# Patient Record
Sex: MALE | Race: WHITE | NOT HISPANIC OR LATINO | URBAN - METROPOLITAN AREA
[De-identification: names, ages, dates, MRNs, and addresses within clinical notes are randomized per-mention and may not be internally consistent; named-entity substitution may affect disease eponyms.]

---

## 2017-02-07 ENCOUNTER — HISTORICAL (OUTPATIENT)
Dept: RADIOLOGY | Facility: HOSPITAL | Age: 72
End: 2017-02-07

## 2017-10-26 ENCOUNTER — HISTORICAL (OUTPATIENT)
Dept: RADIOLOGY | Facility: HOSPITAL | Age: 72
End: 2017-10-26

## 2018-02-08 ENCOUNTER — HISTORICAL (OUTPATIENT)
Dept: ADMINISTRATIVE | Facility: HOSPITAL | Age: 73
End: 2018-02-08

## 2018-02-19 ENCOUNTER — HISTORICAL (OUTPATIENT)
Dept: LAB | Facility: HOSPITAL | Age: 73
End: 2018-02-19

## 2018-02-19 LAB
ABS NEUT (OLG): 3.23 X10(3)/MCL (ref 2.1–9.2)
ALBUMIN SERPL-MCNC: 4 GM/DL (ref 3.4–5)
ALBUMIN/GLOB SERPL: 1.2 {RATIO}
ALP SERPL-CCNC: 68 UNIT/L (ref 50–136)
ALT SERPL-CCNC: 32 UNIT/L (ref 12–78)
APPEARANCE, UA: CLEAR
APTT PPP: 26.3 SECOND(S) (ref 24.8–36.9)
AST SERPL-CCNC: 24 UNIT/L (ref 15–37)
BACTERIA SPEC CULT: NORMAL /HPF
BASOPHILS # BLD AUTO: 0 X10(3)/MCL (ref 0–0.2)
BASOPHILS NFR BLD AUTO: 1 %
BILIRUB SERPL-MCNC: 0.9 MG/DL (ref 0.2–1)
BILIRUB UR QL STRIP: NEGATIVE
BILIRUBIN DIRECT+TOT PNL SERPL-MCNC: 0.2 MG/DL (ref 0–0.2)
BILIRUBIN DIRECT+TOT PNL SERPL-MCNC: 0.7 MG/DL (ref 0–0.8)
BUN SERPL-MCNC: 17 MG/DL (ref 7–18)
CALCIUM SERPL-MCNC: 8.6 MG/DL (ref 8.5–10.1)
CHLORIDE SERPL-SCNC: 103 MMOL/L (ref 98–107)
CO2 SERPL-SCNC: 26 MMOL/L (ref 21–32)
COLOR UR: YELLOW
CREAT SERPL-MCNC: 0.94 MG/DL (ref 0.7–1.3)
EOSINOPHIL # BLD AUTO: 0.2 X10(3)/MCL (ref 0–0.9)
EOSINOPHIL NFR BLD AUTO: 4 %
ERYTHROCYTE [DISTWIDTH] IN BLOOD BY AUTOMATED COUNT: 12.7 % (ref 11.5–17)
GLOBULIN SER-MCNC: 3.2 GM/DL (ref 2.4–3.5)
GLUCOSE (UA): NEGATIVE
GLUCOSE SERPL-MCNC: 106 MG/DL (ref 74–106)
HCT VFR BLD AUTO: 45.6 % (ref 42–52)
HGB BLD-MCNC: 15 GM/DL (ref 14–18)
HGB UR QL STRIP: NEGATIVE
INR PPP: 0.94 (ref 0–1.27)
KETONES UR QL STRIP: NEGATIVE
LEUKOCYTE ESTERASE UR QL STRIP: NEGATIVE
LYMPHOCYTES # BLD AUTO: 1.6 X10(3)/MCL (ref 0.6–4.6)
LYMPHOCYTES NFR BLD AUTO: 28 %
MCH RBC QN AUTO: 30.4 PG (ref 27–31)
MCHC RBC AUTO-ENTMCNC: 32.9 GM/DL (ref 33–36)
MCV RBC AUTO: 92.5 FL (ref 80–94)
MONOCYTES # BLD AUTO: 0.6 X10(3)/MCL (ref 0.1–1.3)
MONOCYTES NFR BLD AUTO: 11 %
NEUTROPHILS # BLD AUTO: 3.23 X10(3)/MCL (ref 1.4–7.9)
NEUTROPHILS NFR BLD AUTO: 56 %
NITRITE UR QL STRIP: NEGATIVE
PH UR STRIP: 6 [PH] (ref 5–9)
PLATELET # BLD AUTO: 186 X10(3)/MCL (ref 130–400)
PMV BLD AUTO: 10.7 FL (ref 9.4–12.4)
POTASSIUM SERPL-SCNC: 4.6 MMOL/L (ref 3.5–5.1)
PROT SERPL-MCNC: 7.2 GM/DL (ref 6.4–8.2)
PROT UR QL STRIP: NEGATIVE
PROTHROMBIN TIME: 12.9 SECOND(S) (ref 12.2–14.7)
RBC # BLD AUTO: 4.93 X10(6)/MCL (ref 4.7–6.1)
RBC #/AREA URNS HPF: NORMAL /[HPF]
SODIUM SERPL-SCNC: 139 MMOL/L (ref 136–145)
SP GR UR STRIP: 1.02 (ref 1–1.03)
SQUAMOUS EPITHELIAL, UA: NORMAL
TRANSFERRIN SERPL-MCNC: 244 MG/DL (ref 200–360)
UROBILINOGEN UR STRIP-ACNC: 0.2
WBC # SPEC AUTO: 5.7 X10(3)/MCL (ref 4.5–11.5)
WBC #/AREA URNS HPF: NORMAL /HPF

## 2018-02-21 LAB — FINAL CULTURE: NORMAL

## 2018-04-03 ENCOUNTER — HISTORICAL (OUTPATIENT)
Dept: ADMINISTRATIVE | Facility: HOSPITAL | Age: 73
End: 2018-04-03

## 2019-11-05 ENCOUNTER — OFFICE VISIT (OUTPATIENT)
Dept: UROLOGY | Facility: CLINIC | Age: 74
End: 2019-11-05
Payer: COMMERCIAL

## 2019-11-05 VITALS
BODY MASS INDEX: 25.73 KG/M2 | HEART RATE: 65 BPM | HEIGHT: 72 IN | DIASTOLIC BLOOD PRESSURE: 80 MMHG | SYSTOLIC BLOOD PRESSURE: 135 MMHG | WEIGHT: 190 LBS

## 2019-11-05 DIAGNOSIS — R97.20 ELEVATED PSA: Primary | ICD-10-CM

## 2019-11-05 DIAGNOSIS — N13.8 BPH WITH URINARY OBSTRUCTION: ICD-10-CM

## 2019-11-05 DIAGNOSIS — N40.1 BPH WITH URINARY OBSTRUCTION: ICD-10-CM

## 2019-11-05 LAB — PSA, DIAGNOSTIC: 4.79 NG/ML (ref 0.1–4)

## 2019-11-05 PROCEDURE — 99213 PR OFFICE/OUTPT VISIT, EST, LEVL III, 20-29 MIN: ICD-10-PCS | Mod: S$GLB,,, | Performed by: UROLOGY

## 2019-11-05 PROCEDURE — 99213 OFFICE O/P EST LOW 20 MIN: CPT | Mod: S$GLB,,, | Performed by: UROLOGY

## 2019-11-05 RX ORDER — IRBESARTAN 300 MG/1
300 TABLET ORAL DAILY
Refills: 3 | COMMUNITY
Start: 2019-09-09

## 2019-11-05 RX ORDER — AMLODIPINE BESYLATE 5 MG/1
5 TABLET ORAL DAILY
Refills: 2 | COMMUNITY
Start: 2019-09-24

## 2019-11-05 RX ORDER — EZETIMIBE 10 MG/1
10 TABLET ORAL DAILY
Refills: 2 | COMMUNITY
Start: 2019-10-04

## 2019-11-05 RX ORDER — HYDROCHLOROTHIAZIDE 12.5 MG/1
12.5 TABLET ORAL DAILY
Refills: 3 | COMMUNITY
Start: 2019-09-09

## 2019-11-05 NOTE — PROGRESS NOTES
Subjective:       Patient ID: Marcelino Al is a 73 y.o. male.    Chief Complaint: Other (6 month fu )      HPI: 73-year-old male presents for 6 month re-evaluation.   Patient has a history of BPH with obstruction, and the patient is on Flomax.  The patient denies any pain or burning with urination.  States for his age he feels like he has a good stream.  Will have occasional episodes of nocturia maybe 1-2 times in a week.  Denies any blood in his urine.  Patient has a history of elevated PSA.  Two thousand fifteen PSA was 6.39.  In 2018 PSA is 5.77.  Patient has had a biopsy in the past which I believe was negative.  No other urinary complaints.  All the problems are stable at this time.      Past Medical History:   Past Medical History:   Diagnosis Date    CAD (coronary artery disease)     Elevated cholesterol     Hypertension        Past Surgical Historical:   Past Surgical History:   Procedure Laterality Date    CAROTID STENT      HEMORRHOID SURGERY      KNEE SURGERY      right knee replaced         Medications:   Medication List with Changes/Refills   Current Medications    AMLODIPINE (NORVASC) 5 MG TABLET    Take 5 mg by mouth once daily.    EZETIMIBE (ZETIA) 10 MG TABLET    Take 10 mg by mouth once daily.    HYDROCHLOROTHIAZIDE (HYDRODIURIL) 12.5 MG TAB    Take 12.5 mg by mouth once daily.    IRBESARTAN (AVAPRO) 300 MG TABLET    Take 300 mg by mouth once daily.    RANITIDINE (ZANTAC) 300 MG TABLET    Take 300 mg by mouth every evening.        Past Social History:   Social History     Socioeconomic History    Marital status:      Spouse name: Not on file    Number of children: Not on file    Years of education: Not on file    Highest education level: Not on file   Occupational History    Not on file   Social Needs    Financial resource strain: Not on file    Food insecurity:     Worry: Not on file     Inability: Not on file    Transportation needs:     Medical: Not on file      Non-medical: Not on file   Tobacco Use    Smoking status: Never Smoker   Substance and Sexual Activity    Alcohol use: Not Currently    Drug use: Never    Sexual activity: Not on file   Lifestyle    Physical activity:     Days per week: Not on file     Minutes per session: Not on file    Stress: Not on file   Relationships    Social connections:     Talks on phone: Not on file     Gets together: Not on file     Attends Voodoo service: Not on file     Active member of club or organization: Not on file     Attends meetings of clubs or organizations: Not on file     Relationship status: Not on file   Other Topics Concern    Not on file   Social History Narrative    Not on file       Allergies: Review of patient's allergies indicates:  Allergies no known allergies     Family History:   Family History   Problem Relation Age of Onset    Heart disease Father     Cancer Sister     Heart disease Brother     Heart disease Brother         Review of Systems:  Review of Systems   Constitutional: Negative for activity change and appetite change.   HENT: Negative for congestion and dental problem.    Respiratory: Negative for chest tightness and shortness of breath.    Cardiovascular: Negative for chest pain.   Gastrointestinal: Negative for abdominal distention and abdominal pain.   Genitourinary: Negative for decreased urine volume, difficulty urinating, discharge, dysuria, enuresis, flank pain, frequency, genital sores, hematuria, penile pain, penile swelling, scrotal swelling, testicular pain and urgency.   Musculoskeletal: Negative for back pain and neck pain.   Neurological: Negative for dizziness.   Hematological: Negative for adenopathy.   Psychiatric/Behavioral: Negative for agitation, behavioral problems and confusion.       Physical Exam:  Physical Exam   Nursing note and vitals reviewed.  Constitutional: He is oriented to person, place, and time. He appears well-developed and well-nourished.   HENT:    Head: Normocephalic.   Cardiovascular: Normal rate, regular rhythm and normal heart sounds.    Pulmonary/Chest: Effort normal and breath sounds normal.   Abdominal: Soft. Bowel sounds are normal.   Neurological: He is alert and oriented to person, place, and time.   Skin: Skin is warm and dry.         Assessment/Plan:   1.  BPH with obstruction:  Patient is doing well on Flomax.  We will continue Flomax.  2.  Elevated PSA:  We will check the patient's PSA, notify me of any abnormal findings.  The patient will be due for a ARACELI on next visit.  Plan follow-up in 6 months, sooner if needed.  Problem List Items Addressed This Visit     None      Visit Diagnoses     Elevated PSA    -  Primary    Relevant Orders    Prostate Specific Antigen, Diagnostic    BPH with urinary obstruction        Relevant Orders    Prostate Specific Antigen, Diagnostic

## 2019-11-06 ENCOUNTER — TELEPHONE (OUTPATIENT)
Dept: UROLOGY | Facility: CLINIC | Age: 74
End: 2019-11-06

## 2020-05-13 ENCOUNTER — OFFICE VISIT (OUTPATIENT)
Dept: UROLOGY | Facility: CLINIC | Age: 75
End: 2020-05-13
Payer: MEDICARE

## 2020-05-13 VITALS
RESPIRATION RATE: 18 BRPM | HEART RATE: 71 BPM | SYSTOLIC BLOOD PRESSURE: 158 MMHG | HEIGHT: 72 IN | DIASTOLIC BLOOD PRESSURE: 90 MMHG | BODY MASS INDEX: 25.73 KG/M2 | WEIGHT: 190 LBS

## 2020-05-13 DIAGNOSIS — R97.20 ELEVATED PSA: Primary | ICD-10-CM

## 2020-05-13 DIAGNOSIS — N13.8 BPH WITH URINARY OBSTRUCTION: ICD-10-CM

## 2020-05-13 DIAGNOSIS — R53.83 FATIGUE, UNSPECIFIED TYPE: ICD-10-CM

## 2020-05-13 DIAGNOSIS — N40.1 BPH WITH URINARY OBSTRUCTION: ICD-10-CM

## 2020-05-13 LAB — POC RESIDUAL URINE VOLUME: 221 ML (ref 0–100)

## 2020-05-13 PROCEDURE — 99214 PR OFFICE/OUTPT VISIT, EST, LEVL IV, 30-39 MIN: ICD-10-PCS | Mod: S$GLB,,, | Performed by: UROLOGY

## 2020-05-13 PROCEDURE — 1159F PR MEDICATION LIST DOCUMENTED IN MEDICAL RECORD: ICD-10-PCS | Mod: S$GLB,,, | Performed by: UROLOGY

## 2020-05-13 PROCEDURE — 51798 POCT BLADDER SCAN: ICD-10-PCS | Mod: S$GLB,,, | Performed by: UROLOGY

## 2020-05-13 PROCEDURE — 1159F MED LIST DOCD IN RCRD: CPT | Mod: S$GLB,,, | Performed by: UROLOGY

## 2020-05-13 PROCEDURE — 99214 OFFICE O/P EST MOD 30 MIN: CPT | Mod: S$GLB,,, | Performed by: UROLOGY

## 2020-05-13 PROCEDURE — 51798 US URINE CAPACITY MEASURE: CPT | Mod: S$GLB,,, | Performed by: UROLOGY

## 2020-05-13 RX ORDER — TAMSULOSIN HYDROCHLORIDE 0.4 MG/1
0.4 CAPSULE ORAL DAILY
COMMUNITY
End: 2020-06-01

## 2020-05-13 RX ORDER — FINASTERIDE 5 MG/1
5 TABLET, FILM COATED ORAL DAILY
COMMUNITY

## 2020-05-13 NOTE — PROGRESS NOTES
Subjective:       Patient ID: Marcelino Al is a 74 y.o. male.    Chief Complaint: 6 mth f/u and Elevated PSA (pt states [facility]  around [facility] and had increased more to around 7.1 or 7.2 (per pt))      HPI: 74-year-old male known service Dr. Devine with a history of BPH with LUTS and elevated PSA.  Patient underwent prostate ultrasound biopsy May of 2019 for PSA of 8.5.  Twelve 12 core specimens were benign.  Patient is voiding with stream that takes a prolonged time to empty.  He is on finasteride 5 mg and tamsulosin 0.4 mg.  He has no nocturia.  Denies dysuria, frequency, urgency, incontinence or gross hematuria.  Patient reports erections are fine.  No other urologic complaints       Past Medical History:   Past Medical History:   Diagnosis Date    BPH with urinary obstruction     CAD (coronary artery disease)     Elevated cholesterol     Elevated PSA     Hypertension        Past Surgical Historical:   Past Surgical History:   Procedure Laterality Date    CAROTID STENT      HEMORRHOID SURGERY      KNEE SURGERY      right knee replaced     PROSTATE BIOPSY          Medications:   Medication List with Changes/Refills   Current Medications    AMLODIPINE (NORVASC) 5 MG TABLET    Take 5 mg by mouth once daily.    EZETIMIBE (ZETIA) 10 MG TABLET    Take 10 mg by mouth once daily.    FINASTERIDE (PROSCAR) 5 MG TABLET    Take 5 mg by mouth once daily.    HYDROCHLOROTHIAZIDE (HYDRODIURIL) 12.5 MG TAB    Take 12.5 mg by mouth once daily.    IRBESARTAN (AVAPRO) 300 MG TABLET    Take 300 mg by mouth once daily.    RANITIDINE (ZANTAC) 300 MG TABLET    Take 300 mg by mouth every evening.    TAMSULOSIN (FLOMAX) 0.4 MG CAP    Take 0.4 mg by mouth once daily.        Past Social History:   Social History     Socioeconomic History    Marital status:      Spouse name: Not on file    Number of children: Not on file    Years of education: Not on file    Highest education level: Not on file    Occupational History    Not on file   Social Needs    Financial resource strain: Not on file    Food insecurity:     Worry: Not on file     Inability: Not on file    Transportation needs:     Medical: Not on file     Non-medical: Not on file   Tobacco Use    Smoking status: Never Smoker   Substance and Sexual Activity    Alcohol use: Not Currently    Drug use: Never    Sexual activity: Not on file   Lifestyle    Physical activity:     Days per week: Not on file     Minutes per session: Not on file    Stress: Not on file   Relationships    Social connections:     Talks on phone: Not on file     Gets together: Not on file     Attends Confucianism service: Not on file     Active member of club or organization: Not on file     Attends meetings of clubs or organizations: Not on file     Relationship status: Not on file   Other Topics Concern    Not on file   Social History Narrative    Not on file       Allergies: Review of patient's allergies indicates:  No Known Allergies     Family History:   Family History   Problem Relation Age of Onset    Heart disease Father     Cancer Sister     Heart disease Brother     Heart disease Brother         Review of Systems:  Review of Systems   Constitutional: Negative for activity change and appetite change.   HENT: Negative for congestion and dental problem.    Eyes: Negative for visual disturbance.   Respiratory: Negative for chest tightness and shortness of breath.    Cardiovascular: Negative for chest pain.   Gastrointestinal: Negative for abdominal distention and abdominal pain.   Genitourinary: Negative for decreased urine volume, difficulty urinating, discharge, dysuria, enuresis, flank pain, frequency, genital sores, hematuria, penile pain, penile swelling, scrotal swelling, testicular pain and urgency.   Musculoskeletal: Negative for back pain and neck pain.   Skin: Negative for color change.   Neurological: Negative for dizziness.   Hematological: Negative  for adenopathy.   Psychiatric/Behavioral: Negative for agitation, behavioral problems and confusion.       Physical Exam:  Physical Exam   Constitutional: He is oriented to person, place, and time. He appears well-developed and well-nourished.   HENT:   Head: Normocephalic.   Eyes: No scleral icterus.   Neck: Normal range of motion.   Cardiovascular: Intact distal pulses.    Pulmonary/Chest: Effort normal and breath sounds normal.   Abdominal: Soft. He exhibits no distension. There is no tenderness. No hernia. Hernia confirmed negative in the right inguinal area and confirmed negative in the left inguinal area.   Genitourinary: Testes normal and penis normal. Cremasteric reflex is present.   Neurological: He is alert and oriented to person, place, and time.   Skin: Skin is warm and dry.     Psychiatric: He has a normal mood and affect.       Assessment/Plan:   Elevated PSA--by history, benign biopsy May of 2019 with a PSA of 8.5.  Repeat serum percent free and total PSA and serum testosterone today.    BPH with LUTS--postvoid residual 221 mL.  Increase Flomax to 0.8 mg daily (adequate medication on hand)/continue finasteride 5 mg daily.  Return to clinic 2 weeks recheck PVR  Problem List Items Addressed This Visit     None      Visit Diagnoses     Elevated PSA    -  Primary    Relevant Orders    PSA, total and free    BPH with urinary obstruction        Relevant Orders    POCT Bladder Scan (Completed)    Fatigue, unspecified type        Relevant Orders    Testosterone

## 2020-05-14 LAB
Lab: NORMAL
TESTOST SERPL-MCNC: 209 NG/DL (ref 193–740)

## 2020-05-18 ENCOUNTER — TELEPHONE (OUTPATIENT)
Dept: UROLOGY | Facility: CLINIC | Age: 75
End: 2020-05-18

## 2020-05-27 ENCOUNTER — OFFICE VISIT (OUTPATIENT)
Dept: UROLOGY | Facility: CLINIC | Age: 75
End: 2020-05-27
Payer: MEDICARE

## 2020-05-27 VITALS
DIASTOLIC BLOOD PRESSURE: 84 MMHG | BODY MASS INDEX: 25.73 KG/M2 | HEIGHT: 72 IN | RESPIRATION RATE: 18 BRPM | HEART RATE: 96 BPM | WEIGHT: 190 LBS | SYSTOLIC BLOOD PRESSURE: 142 MMHG

## 2020-05-27 DIAGNOSIS — N13.8 BPH WITH URINARY OBSTRUCTION: Primary | ICD-10-CM

## 2020-05-27 DIAGNOSIS — N40.1 BPH WITH URINARY OBSTRUCTION: Primary | ICD-10-CM

## 2020-05-27 DIAGNOSIS — R35.0 URINARY FREQUENCY: ICD-10-CM

## 2020-05-27 DIAGNOSIS — R39.15 URINARY URGENCY: ICD-10-CM

## 2020-05-27 LAB — POC RESIDUAL URINE VOLUME: 131 ML (ref 0–100)

## 2020-05-27 PROCEDURE — 51798 POCT BLADDER SCAN: ICD-10-PCS | Mod: S$GLB,,, | Performed by: UROLOGY

## 2020-05-27 PROCEDURE — 99213 OFFICE O/P EST LOW 20 MIN: CPT | Mod: S$GLB,,, | Performed by: UROLOGY

## 2020-05-27 PROCEDURE — 1159F PR MEDICATION LIST DOCUMENTED IN MEDICAL RECORD: ICD-10-PCS | Mod: S$GLB,,, | Performed by: UROLOGY

## 2020-05-27 PROCEDURE — 99213 PR OFFICE/OUTPT VISIT, EST, LEVL III, 20-29 MIN: ICD-10-PCS | Mod: S$GLB,,, | Performed by: UROLOGY

## 2020-05-27 PROCEDURE — 51798 US URINE CAPACITY MEASURE: CPT | Mod: S$GLB,,, | Performed by: UROLOGY

## 2020-05-27 PROCEDURE — 1159F MED LIST DOCD IN RCRD: CPT | Mod: S$GLB,,, | Performed by: UROLOGY

## 2020-05-27 NOTE — PROGRESS NOTES
Subjective:       Patient ID: Marcelino Al is a 74 y.o. male.    Chief Complaint: 2 wk f/u (with bladder scan)      HPI: 74 year old male, patient of Dr. PERERA, presents for 2 week eval.  Patient has history of BPH with LUTS.  Patient is on proscar and flomax.  Patient complained of a weak stream, frequency, and urgency.  Bladder scan was 211.  Flomax was increased to 2 a day.    Patient states he has not noticed a change in his symptoms.  Still has a weak stream, still has episodes of frequency, and still has episodes of urgency.  Bladder scan has improved; now 131.    Denies pain or burning.  Denies blood in urine.  Denies weight loss.    No other urinary complaints. All other health problems appear stable at this time.        Past Medical History:   Past Medical History:   Diagnosis Date    BPH with urinary obstruction     CAD (coronary artery disease)     Elevated cholesterol     Elevated PSA     Hypertension        Past Surgical Historical:   Past Surgical History:   Procedure Laterality Date    CAROTID STENT      HEMORRHOID SURGERY      KNEE SURGERY      right knee replaced     PROSTATE BIOPSY          Medications:   Medication List with Changes/Refills   Current Medications    AMLODIPINE (NORVASC) 5 MG TABLET    Take 5 mg by mouth once daily.    EZETIMIBE (ZETIA) 10 MG TABLET    Take 10 mg by mouth once daily.    FINASTERIDE (PROSCAR) 5 MG TABLET    Take 5 mg by mouth once daily.    HYDROCHLOROTHIAZIDE (HYDRODIURIL) 12.5 MG TAB    Take 12.5 mg by mouth once daily.    IRBESARTAN (AVAPRO) 300 MG TABLET    Take 300 mg by mouth once daily.    RANITIDINE (ZANTAC) 300 MG TABLET    Take 300 mg by mouth every evening.    TAMSULOSIN (FLOMAX) 0.4 MG CAP    Take 0.4 mg by mouth once daily.        Past Social History:   Social History     Socioeconomic History    Marital status:      Spouse name: Not on file    Number of children: Not on file    Years of education: Not on file    Highest  education level: Not on file   Occupational History    Not on file   Social Needs    Financial resource strain: Not on file    Food insecurity:     Worry: Not on file     Inability: Not on file    Transportation needs:     Medical: Not on file     Non-medical: Not on file   Tobacco Use    Smoking status: Never Smoker   Substance and Sexual Activity    Alcohol use: Not Currently    Drug use: Never    Sexual activity: Not on file   Lifestyle    Physical activity:     Days per week: Not on file     Minutes per session: Not on file    Stress: Not on file   Relationships    Social connections:     Talks on phone: Not on file     Gets together: Not on file     Attends Sikh service: Not on file     Active member of club or organization: Not on file     Attends meetings of clubs or organizations: Not on file     Relationship status: Not on file   Other Topics Concern    Not on file   Social History Narrative    Not on file       Allergies: Review of patient's allergies indicates:  No Known Allergies     Family History:   Family History   Problem Relation Age of Onset    Heart disease Father     Cancer Sister     Heart disease Brother     Heart disease Brother         Review of Systems:  Review of Systems   Constitutional: Negative for activity change and appetite change.   HENT: Negative for congestion and dental problem.    Respiratory: Negative for chest tightness and shortness of breath.    Cardiovascular: Negative for chest pain.   Gastrointestinal: Negative for abdominal distention and abdominal pain.   Genitourinary: Positive for difficulty urinating, frequency and urgency. Negative for decreased urine volume, discharge, dysuria, enuresis, flank pain, genital sores, hematuria, penile pain, penile swelling, scrotal swelling and testicular pain.   Musculoskeletal: Negative for back pain and neck pain.   Neurological: Negative for dizziness.   Hematological: Negative for adenopathy.    Psychiatric/Behavioral: Negative for agitation, behavioral problems and confusion.       Physical Exam:  Physical Exam   Nursing note and vitals reviewed.  Constitutional: He is oriented to person, place, and time. He appears well-developed and well-nourished.   HENT:   Head: Normocephalic.   Cardiovascular: Normal rate, regular rhythm and normal heart sounds.    Pulmonary/Chest: Effort normal and breath sounds normal.   Abdominal: Soft. Bowel sounds are normal.   Neurological: He is alert and oriented to person, place, and time.   Skin: Skin is warm and dry.     Bladder scan: 131 cc    Assessment/Plan:   BPH with Obstruction/Frequency/Urgency:  Patient still has symptoms after increasing Flomax to 2 a day.  Will schedule patient for Cysto for further evaluation.    Follow up to be arranged.   Problem List Items Addressed This Visit     None      Visit Diagnoses     BPH with urinary obstruction    -  Primary    Relevant Orders    POCT Bladder Scan (Completed)    Cystoscopy    Urinary frequency        Relevant Orders    Cystoscopy    Urinary urgency        Relevant Orders    Cystoscopy

## 2020-06-01 DIAGNOSIS — N13.8 BPH WITH URINARY OBSTRUCTION: Primary | ICD-10-CM

## 2020-06-01 DIAGNOSIS — N40.1 BPH WITH URINARY OBSTRUCTION: Primary | ICD-10-CM

## 2020-06-01 RX ORDER — TAMSULOSIN HYDROCHLORIDE 0.4 MG/1
0.8 CAPSULE ORAL DAILY
Qty: 180 CAPSULE | Refills: 3 | Status: SHIPPED | OUTPATIENT
Start: 2020-06-01 | End: 2021-06-01

## 2020-06-02 ENCOUNTER — PROCEDURE VISIT (OUTPATIENT)
Dept: UROLOGY | Facility: CLINIC | Age: 75
End: 2020-06-02
Payer: MEDICARE

## 2020-06-02 VITALS
DIASTOLIC BLOOD PRESSURE: 81 MMHG | OXYGEN SATURATION: 96 % | WEIGHT: 199 LBS | SYSTOLIC BLOOD PRESSURE: 155 MMHG | HEIGHT: 72 IN | BODY MASS INDEX: 26.95 KG/M2 | RESPIRATION RATE: 16 BRPM | HEART RATE: 77 BPM

## 2020-06-02 DIAGNOSIS — R35.0 URINARY FREQUENCY: ICD-10-CM

## 2020-06-02 DIAGNOSIS — N13.8 BPH WITH URINARY OBSTRUCTION: ICD-10-CM

## 2020-06-02 DIAGNOSIS — N40.1 BPH WITH URINARY OBSTRUCTION: ICD-10-CM

## 2020-06-02 DIAGNOSIS — R39.15 URINARY URGENCY: ICD-10-CM

## 2020-06-02 LAB
BILIRUB UR QL STRIP: NEGATIVE
CLARITY, POC UA: ABNORMAL
COLOR, POC UA: ABNORMAL
GLUCOSE UR QL STRIP: NEGATIVE
KETONES UR QL STRIP: NEGATIVE
LEUKOCYTE ESTERASE UR QL STRIP: NEGATIVE
NITRITE, POC UA: ABNORMAL
PH, POC UA: 6.5
POC AMORP, URINE: ABNORMAL
POC BACTI, URINE: ABNORMAL
POC BLOOD, URINE: NEGATIVE
POC CASTS, URINE: ABNORMAL
POC CRYST, URINE: ABNORMAL
POC EPITH, URINE: ABNORMAL
POC HCG, URINE: ABNORMAL
POC HYALIN, URINE: ABNORMAL
POC MUCUS, URINE: ABNORMAL
POC NITRATES, URINE: NEGATIVE
POC OTHER, URINE: ABNORMAL
POC RBC, URINE: ABNORMAL
POC WBC, URINE: ABNORMAL
PROT UR QL STRIP: NEGATIVE
SP GR UR STRIP: 1.01 (ref 1–1.03)
UROBILINOGEN UR STRIP-ACNC: 0.2 (ref 0.3–2.2)

## 2020-06-02 PROCEDURE — 52000 CYSTOURETHROSCOPY: CPT | Mod: S$GLB,,, | Performed by: UROLOGY

## 2020-06-02 PROCEDURE — 52000 CYSTOSCOPY: ICD-10-PCS | Mod: S$GLB,,, | Performed by: UROLOGY

## 2020-06-02 RX ORDER — AMPICILLIN 500 MG/1
500 CAPSULE ORAL
Status: COMPLETED | OUTPATIENT
Start: 2020-06-02 | End: 2020-06-02

## 2020-06-02 RX ORDER — CIPROFLOXACIN 500 MG/1
500 TABLET ORAL
Status: COMPLETED | OUTPATIENT
Start: 2020-06-02 | End: 2020-06-02

## 2020-06-02 RX ADMIN — CIPROFLOXACIN 500 MG: 500 TABLET ORAL at 09:06

## 2020-06-02 RX ADMIN — AMPICILLIN 500 MG: 500 CAPSULE ORAL at 09:06

## 2020-06-02 NOTE — PATIENT INSTRUCTIONS
Cystoscopy    Cystoscopy is a procedure that lets your doctor look directly inside your urethra and bladder. It can be used to:  · Help diagnose a problem with your urethra, bladder, or kidneys.  · Take a sample (biopsy) of bladder or urethral tissue.  · Treat certain problems (such as removing kidney stones).  · Place a stent to bypass an obstruction.  · Take special X-rays of the kidneys.  Based on the findings, your doctor may recommend other tests or treatments.  What is a cystoscope?  A cystoscope is a telescope-like instrument that contains lenses and fiberoptics (small glass wires that make bright light). The cystoscope may be straight and rigid, or flexible to bend around curves in the urethra. The doctor may look directly into the cystoscope, or project the image onto a monitor.  Getting ready  · Ask your doctor if you should stop taking any medicines before the procedure.  · Ask whether you should avoid eating or drinking anything after midnight before the procedure.  · Follow any other instructions your doctor gives you.  Tell your doctor before the exam if you:  · Take any medicines, such as aspirin or blood thinners  · Have allergies to any medicines  · Are pregnant   The procedure  Cystoscopy is done in the doctors office, surgery center, or hospital. The doctor and a nurse are present during the procedure. It takes only a few minutes, longer if a biopsy, X-ray, or treatment needs to be done.  During the procedure:  · You lie on an exam table on your back, knees bent and legs apart. You are covered with a drape.  · Your urethra and the area around it are washed. Anesthetic jelly may be applied to numb the urethra. Other pain medicine is usually not needed. In some cases, you may be offered a mild sedative to help you relax. If a more extensive procedure is to be done, such as a biopsy or kidney stone removal, general anesthesia may be needed.  · The cystoscope is inserted. A sterile fluid is put  into the bladder to expand it. You may feel pressure from this fluid.  · When the procedure is done, the cystoscope is removed.  After the procedure  If you had a sedative, general anesthesia, or spinal anesthesia, you must have someone drive you home. Once youre home:  · Drink plenty of fluids.  · You may have burning or light bleeding when you urinate--this is normal.  · Medicines may be prescribed to ease any discomfort or prevent infection. Take these as directed.  · Call your doctor if you have heavy bleeding or blood clots, burning that lasts more than a day, a fever over 100°F  (38° C), or trouble urinating.  Date Last Reviewed: 1/1/2017  © 7300-3711 [employer]. [address] [place] [address], [place] [address]. All rights reserved. This information is not intended as a substitute for professional medical care. Always follow your healthcare professional's instructions.

## 2020-06-02 NOTE — PROCEDURES
"Cystoscopy  Date/Time: 6/2/2020 10:00 AM  Performed by: Uvaldo Devine MD  Authorized by: Uvaldo Devine MD     Consent Done?:  Yes (Written)  Time out: Immediately prior to procedure a "time out" was called to verify the correct patient, procedure, equipment, support staff and site/side marked as required.    Indications: BPH    Position:  Supine  Anesthesia:  Intraurethral instillation  Patient sedated?: No    Preparation: Patient was prepped and draped in usual sterile fashion      Scope type:  Flexible cystoscope  Stent inserted: No    Stent removed: No    External exam normal: Yes    Digital exam performed: No    Urethra normal: Yes    Prostate normal: No          Hyperplasia (Trilobar)(Length (cm):  5)Bladder neck normal: Bladder neck normal   Normal bladder: severely trabeculted.      Patient tolerance:  Patient tolerated the procedure well with no immediate complications     Will schedule turp      "

## 2020-06-22 ENCOUNTER — CLINICAL SUPPORT (OUTPATIENT)
Dept: UROLOGY | Facility: CLINIC | Age: 75
End: 2020-06-22
Payer: MEDICARE

## 2020-06-22 DIAGNOSIS — N13.8 BPH WITH URINARY OBSTRUCTION: Primary | ICD-10-CM

## 2020-06-22 DIAGNOSIS — N40.1 BPH WITH URINARY OBSTRUCTION: Primary | ICD-10-CM

## 2020-06-22 NOTE — PROGRESS NOTES
Pt decided to postpone the surgery at this time. Possibly in August he is willing to have the surgery. Pt will let us know when he is ready.

## 2021-09-21 ENCOUNTER — HISTORICAL (OUTPATIENT)
Dept: LAB | Facility: HOSPITAL | Age: 76
End: 2021-09-21

## 2021-09-21 LAB — SARS-COV-2 AG RESP QL IA.RAPID: NOT DETECTED

## 2022-04-11 ENCOUNTER — HISTORICAL (OUTPATIENT)
Dept: ADMINISTRATIVE | Facility: HOSPITAL | Age: 77
End: 2022-04-11
Payer: MEDICARE

## 2022-04-29 VITALS
BODY MASS INDEX: 24.93 KG/M2 | SYSTOLIC BLOOD PRESSURE: 148 MMHG | HEIGHT: 72 IN | DIASTOLIC BLOOD PRESSURE: 80 MMHG | WEIGHT: 184.06 LBS

## 2022-07-14 ENCOUNTER — HOSPITAL ENCOUNTER (OUTPATIENT)
Dept: RADIOLOGY | Facility: HOSPITAL | Age: 77
Discharge: HOME | End: 2022-07-14
Attending: INTERNAL MEDICINE
Payer: MEDICARE

## 2022-07-14 DIAGNOSIS — R05.1 ACUTE COUGH: ICD-10-CM

## 2022-07-14 PROCEDURE — 71046 X-RAY EXAM CHEST 2 VIEWS: CPT | Mod: TC

## 2024-07-02 ENCOUNTER — HOSPITAL ENCOUNTER (OUTPATIENT)
Dept: RADIOLOGY | Facility: HOSPITAL | Age: 79
Discharge: HOME | End: 2024-07-02
Attending: INTERNAL MEDICINE
Payer: OTHER GOVERNMENT

## 2024-07-02 DIAGNOSIS — M79.641 RIGHT HAND PAIN: ICD-10-CM

## 2024-07-02 PROCEDURE — 73130 X-RAY EXAM OF HAND: CPT | Mod: TC,RT

## 2024-08-22 DIAGNOSIS — R63.4 ABNORMAL WEIGHT LOSS: Primary | ICD-10-CM

## 2024-08-26 DIAGNOSIS — M79.643 HAND PAIN: Primary | ICD-10-CM

## 2024-08-29 ENCOUNTER — HOSPITAL ENCOUNTER (OUTPATIENT)
Dept: RADIOLOGY | Facility: HOSPITAL | Age: 79
Discharge: HOME | End: 2024-08-29
Attending: STUDENT IN AN ORGANIZED HEALTH CARE EDUCATION/TRAINING PROGRAM
Payer: OTHER GOVERNMENT

## 2024-08-29 DIAGNOSIS — R63.4 ABNORMAL WEIGHT LOSS: ICD-10-CM

## 2024-08-29 DIAGNOSIS — M79.643 HAND PAIN: ICD-10-CM

## 2024-08-29 PROCEDURE — 74176 CT ABD & PELVIS W/O CONTRAST: CPT | Mod: TC

## 2024-08-29 PROCEDURE — 70490 CT SOFT TISSUE NECK W/O DYE: CPT | Mod: TC

## 2024-08-29 PROCEDURE — 73200 CT UPPER EXTREMITY W/O DYE: CPT | Mod: TC,RT

## 2024-08-29 PROCEDURE — 73200 CT UPPER EXTREMITY W/O DYE: CPT | Mod: TC,LT

## 2024-08-29 PROCEDURE — 71250 CT THORAX DX C-: CPT | Mod: TC

## 2024-09-10 ENCOUNTER — ANESTHESIA EVENT (OUTPATIENT)
Dept: GASTROENTEROLOGY | Facility: HOSPITAL | Age: 79
End: 2024-09-10
Payer: MEDICARE

## 2024-09-10 ENCOUNTER — HOSPITAL ENCOUNTER (OUTPATIENT)
Dept: RADIOLOGY | Facility: HOSPITAL | Age: 79
Discharge: HOME | End: 2024-09-10
Attending: SURGERY
Payer: MEDICARE

## 2024-09-10 ENCOUNTER — HOSPITAL ENCOUNTER (OUTPATIENT)
Dept: PREADMISSION TESTING | Facility: HOSPITAL | Age: 79
Discharge: HOME | End: 2024-09-10
Attending: SURGERY
Payer: MEDICARE

## 2024-09-10 VITALS — BODY MASS INDEX: 24.65 KG/M2 | WEIGHT: 182 LBS | HEIGHT: 72 IN

## 2024-09-10 DIAGNOSIS — Z01.818 PRE-OP TESTING: ICD-10-CM

## 2024-09-10 DIAGNOSIS — R13.10 DYSPHAGIA, UNSPECIFIED TYPE: Primary | ICD-10-CM

## 2024-09-10 LAB
OHS QRS DURATION: 72 MS
OHS QTC CALCULATION: 410 MS

## 2024-09-10 PROCEDURE — 71046 X-RAY EXAM CHEST 2 VIEWS: CPT | Mod: TC

## 2024-09-10 PROCEDURE — 93010 ELECTROCARDIOGRAM REPORT: CPT | Mod: ,,, | Performed by: INTERNAL MEDICINE

## 2024-09-10 PROCEDURE — 93005 ELECTROCARDIOGRAM TRACING: CPT

## 2024-09-10 RX ORDER — BISACODYL 5 MG/1
1 TABLET, COATED ORAL DAILY
COMMUNITY

## 2024-09-10 RX ORDER — LANOLIN ALCOHOL/MO/W.PET/CERES
1000 CREAM (GRAM) TOPICAL
COMMUNITY
Start: 2024-08-20

## 2024-09-10 RX ORDER — GLYCOPYRROLATE 0.2 MG/ML
0.2 INJECTION INTRAMUSCULAR; INTRAVENOUS
Status: CANCELLED | OUTPATIENT
Start: 2024-09-11

## 2024-09-10 RX ORDER — LOSARTAN POTASSIUM 50 MG/1
25 TABLET ORAL
COMMUNITY
Start: 2024-08-20

## 2024-09-10 RX ORDER — PANTOPRAZOLE SODIUM 40 MG/1
40 TABLET, DELAYED RELEASE ORAL
COMMUNITY
Start: 2024-08-20

## 2024-09-10 RX ORDER — ATORVASTATIN CALCIUM 80 MG/1
80 TABLET, FILM COATED ORAL
COMMUNITY
Start: 2024-08-20

## 2024-09-10 RX ORDER — SODIUM CHLORIDE, SODIUM LACTATE, POTASSIUM CHLORIDE, CALCIUM CHLORIDE 600; 310; 30; 20 MG/100ML; MG/100ML; MG/100ML; MG/100ML
INJECTION, SOLUTION INTRAVENOUS CONTINUOUS
Status: CANCELLED | OUTPATIENT
Start: 2024-09-11

## 2024-09-10 RX ORDER — EZETIMIBE 10 MG/1
10 TABLET ORAL DAILY
COMMUNITY

## 2024-09-10 NOTE — DISCHARGE INSTRUCTIONS

## 2024-09-10 NOTE — ANESTHESIA PREPROCEDURE EVALUATION
09/10/2024  Marcelino Al is a 78 y.o., male.    Anesthesia History    No specialty history recorded    Other Medical History   CAD (coronary artery disease) Hypertension   Elevated cholesterol BPH with urinary obstruction   Elevated PSA      Surgical History    CAROTID STENT KNEE SURGERY   HEMORRHOID SURGERY PROSTATE BIOPSY     COVID-19 Risk of Complications  Current as of yesterday    4 0 to < 3 Points: Low Risk   3 to < 6 Points: Medium Risk   6 to 16 Points: High Risk     No Change      Details   Substance History    Smoking Status: Never   Smokeless Tobacco Status: Unknown   Alcohol use: Not Currently   Drug use: Never     Anesthesia Family History    Problem Relations (Age of Onset)   No history of this type found      Current Gender Identity    Questions Responses   Patient's Gender Identity:    Patient's sex assigned at birth:         Pre-op Assessment    I have reviewed the Patient Summary Reports.     I have reviewed the Nursing Notes. I have reviewed the NPO Status.   I have reviewed the Medications.     Review of Systems  Anesthesia Hx:  No problems with previous Anesthesia             Denies Family Hx of Anesthesia complications.    Denies Personal Hx of Anesthesia complications.                    Social:  Non-Smoker       Hematology/Oncology:  Hematology Normal   Oncology Normal                                   EENT/Dental:  EENT/Dental Normal           Cardiovascular:  Exercise tolerance: good   Hypertension   CAD           hyperlipidemia                             Pulmonary:  Pulmonary Normal                       Renal/:    BPH              Hepatic/GI:  Hepatic/GI Normal                 Musculoskeletal:  Musculoskeletal Normal                Neurological:  Neurology Normal                                      Endocrine:  Endocrine Normal            Dermatological:  Skin Normal     Psych:  Psychiatric Normal                    Physical Exam  General: Well nourished, Cooperative, Alert and Oriented    Airway:  Mallampati: II / II  Mouth Opening: Normal  TM Distance: Normal  Tongue: Normal  Neck ROM: Normal ROM    Dental:  Intact        Anesthesia Plan  Type of Anesthesia, risks & benefits discussed:    Anesthesia Type: MAC  Intra-op Monitoring Plan: Standard ASA Monitors  Post Op Pain Control Plan: multimodal analgesia  Induction:  IV  Informed Consent: Informed consent signed with the Patient and all parties understand the risks and agree with anesthesia plan.  All questions answered. Patient consented to blood products? Yes  ASA Score: 3  Day of Surgery Review of History & Physical: H&P Update referred to the surgeon/provider.I have interviewed and examined the patient. I have reviewed the patient's H&P dated: There are no significant changes.     Ready For Surgery From Anesthesia Perspective.     .

## 2024-09-11 ENCOUNTER — HOSPITAL ENCOUNTER (OUTPATIENT)
Dept: GASTROENTEROLOGY | Facility: HOSPITAL | Age: 79
Discharge: HOME | End: 2024-09-11
Attending: SURGERY
Payer: MEDICARE

## 2024-09-11 ENCOUNTER — ANESTHESIA (OUTPATIENT)
Dept: GASTROENTEROLOGY | Facility: HOSPITAL | Age: 79
End: 2024-09-11
Payer: MEDICARE

## 2024-09-11 DIAGNOSIS — Z01.818 PRE-OP TESTING: ICD-10-CM

## 2024-09-11 DIAGNOSIS — R13.10 DYSPHAGIA, UNSPECIFIED TYPE: Primary | ICD-10-CM

## 2024-09-11 DIAGNOSIS — R13.10 DYSPHAGIA, IDIOPATHIC: ICD-10-CM

## 2024-09-11 PROCEDURE — 37000008 HC ANESTHESIA 1ST 15 MINUTES

## 2024-09-11 PROCEDURE — 25000003 PHARM REV CODE 250: Performed by: NURSE ANESTHETIST, CERTIFIED REGISTERED

## 2024-09-11 PROCEDURE — 27201423 OPTIME MED/SURG SUP & DEVICES STERILE SUPPLY

## 2024-09-11 PROCEDURE — 63600175 PHARM REV CODE 636 W HCPCS: Performed by: SURGERY

## 2024-09-11 PROCEDURE — 63600175 PHARM REV CODE 636 W HCPCS: Performed by: NURSE ANESTHETIST, CERTIFIED REGISTERED

## 2024-09-11 PROCEDURE — 37000009 HC ANESTHESIA EA ADD 15 MINS

## 2024-09-11 RX ORDER — SODIUM CHLORIDE 9 MG/ML
INJECTION, SOLUTION INTRAVENOUS CONTINUOUS
Status: DISCONTINUED | OUTPATIENT
Start: 2024-09-11 | End: 2024-09-12 | Stop reason: HOSPADM

## 2024-09-11 RX ORDER — LIDOCAINE HYDROCHLORIDE 20 MG/ML
INJECTION INTRAVENOUS
Status: DISCONTINUED | OUTPATIENT
Start: 2024-09-11 | End: 2024-09-11

## 2024-09-11 RX ORDER — SODIUM CHLORIDE, SODIUM LACTATE, POTASSIUM CHLORIDE, CALCIUM CHLORIDE 600; 310; 30; 20 MG/100ML; MG/100ML; MG/100ML; MG/100ML
INJECTION, SOLUTION INTRAVENOUS CONTINUOUS
Status: DISCONTINUED | OUTPATIENT
Start: 2024-09-11 | End: 2024-09-12 | Stop reason: HOSPADM

## 2024-09-11 RX ORDER — TRANEXAMIC ACID 100 MG/ML
INJECTION, SOLUTION INTRAVENOUS
Status: DISCONTINUED | OUTPATIENT
Start: 2024-09-11 | End: 2024-09-11

## 2024-09-11 RX ORDER — GLYCOPYRROLATE 0.2 MG/ML
0.2 INJECTION INTRAMUSCULAR; INTRAVENOUS
Status: DISCONTINUED | OUTPATIENT
Start: 2024-09-11 | End: 2024-09-12 | Stop reason: HOSPADM

## 2024-09-11 RX ORDER — PROPOFOL 10 MG/ML
VIAL (ML) INTRAVENOUS
Status: DISCONTINUED | OUTPATIENT
Start: 2024-09-11 | End: 2024-09-11

## 2024-09-11 RX ADMIN — PROPOFOL 30 MG: 10 INJECTION, EMULSION INTRAVENOUS at 07:09

## 2024-09-11 RX ADMIN — LIDOCAINE HYDROCHLORIDE 50 MG: 20 INJECTION, SOLUTION INTRAVENOUS at 07:09

## 2024-09-11 RX ADMIN — SODIUM CHLORIDE, POTASSIUM CHLORIDE, SODIUM LACTATE AND CALCIUM CHLORIDE: 600; 310; 30; 20 INJECTION, SOLUTION INTRAVENOUS at 07:09

## 2024-09-11 RX ADMIN — PROPOFOL 50 MG: 10 INJECTION, EMULSION INTRAVENOUS at 07:09

## 2024-09-11 RX ADMIN — TRANEXAMIC ACID 1000 MG: 100 INJECTION, SOLUTION INTRAVENOUS at 07:09

## 2024-09-11 NOTE — DISCHARGE INSTRUCTIONS
Follow-up with Dr. Ruvalcaba  Diet:  as tolerated  Activity:  decrease activity today, no driving today, resume all activity tomorrow  Notify MD:  increased swelling of abdomen, difficulty breathing/swallowing, excessive nausea/vomiting, excessive bright red bleeding from mouth/vomit,  Medications:  continue your home medications, keep a list of your home medications at all times for emergencies.

## 2024-09-11 NOTE — ANESTHESIA POSTPROCEDURE EVALUATION
Anesthesia Post Evaluation    Patient: Marcelino Al    Procedure(s) Performed: * No procedures listed *    Final Anesthesia Type: MAC      Patient location during evaluation: GI PACU  Patient participation: Yes- Able to Participate  Level of consciousness: awake and alert, awake and oriented  Post-procedure vital signs: reviewed and stable  Pain management: adequate  Airway patency: patent    PONV status at discharge: No PONV  Anesthetic complications: no      Cardiovascular status: blood pressure returned to baseline  Respiratory status: unassisted, room air and spontaneous ventilation  Hydration status: euvolemic  Follow-up not needed.              Vitals Value Taken Time   /71 09/11/24 0645   Temp 36.4 °C (97.5 °F) 09/11/24 0645   Pulse 63 09/11/24 0645   Resp 20 09/11/24 0645   SpO2 98 % 09/11/24 0645         No case tracking events are documented in the log.      Pain/Hallie Score: No data recorded         Never smoker

## 2024-09-11 NOTE — PLAN OF CARE
MD MADE ROUNDS AND DISCUSSED EGD RESULTS AND PENDING PATHOLOGY. PT AND WIFE VOICED UNDERSTANDING. SUPPORT AND ENCOURAGEMENT GIVEN. NO NEEDS VOICED.

## 2024-09-11 NOTE — DISCHARGE SUMMARY
[facility]  [facility]  Short Stay    EGD      OUTCOME: Patient tolerated treatment/procedure well without complication and is now ready for discharge.    DISPOSITION: Home or Self Care      FINAL DIAGNOSIS:  Pre-op testing  1. NORMAL DUODENAL IN ALL 4 PORTIONS AND INTO THE JEJUNUM   2. NORMAL ANTRUM FUNDUS OF THE STOMACH   3. LARGE 7 CM HIATAL HERNIA INVOLVING MOST OF THE CARDIA   4. Z-LINE AT 35 CM  5. BIOPSY OF THE ANTRUM TAKEN FOR HISTO PATH AND H PYLORI  6. BIOPSY X2 OF THE GE JUNCTION TAKEN FOR HISTO PATH   7. ESOPHAGEAL TUMOR BETWEEN 35 AND 30 CM PARTIALLY REMOVED WITH A HOT LOOP SNARE SENT OFF FOR PATHOLOGY  8. NORMAL ESOPHAGUS FROM 30 CM TO OROPHARYNX  FOLLOWUP: In clinic 1 WEEK    DISCHARGE INSTRUCTIONS:    Discharge Procedure Orders   Diet general        TIME SPENT ON DISCHARGE:  5 minutes

## 2024-09-11 NOTE — PLAN OF CARE
PT IS BACK TO ROOM, AWAKE AND ALERT, IN STABLE CONDITION, NO DIFFICULTY BREATHING OR SWALLOWING. PT IS TOLERATING LIQUIDS, FAMILY AT SIDE, SR X2, CB IN REACH. FREQUENT VITALS IN PROGRESS.

## 2024-09-11 NOTE — PLAN OF CARE
Discharge instructions provided to patient and family, allowed time for questions, verbalized understanding. TLC and encouragement given per this nurse.     Discharged home in stable condition via ambulation per pt preference,declined wheel chair, gait steady, accompanied by family, no s/s of distress noted

## 2024-09-12 VITALS
OXYGEN SATURATION: 99 % | HEART RATE: 64 BPM | RESPIRATION RATE: 20 BRPM | BODY MASS INDEX: 24.7 KG/M2 | DIASTOLIC BLOOD PRESSURE: 75 MMHG | TEMPERATURE: 98 F | SYSTOLIC BLOOD PRESSURE: 120 MMHG | WEIGHT: 182.13 LBS

## 2024-09-20 ENCOUNTER — HOSPITAL ENCOUNTER (OUTPATIENT)
Dept: RADIOLOGY | Facility: HOSPITAL | Age: 79
Discharge: HOME | End: 2024-09-20
Attending: SURGERY
Payer: OTHER GOVERNMENT

## 2024-09-20 DIAGNOSIS — R13.10 DYSPHAGIA, UNSPECIFIED TYPE: ICD-10-CM

## 2024-09-20 PROCEDURE — 76700 US EXAM ABDOM COMPLETE: CPT | Mod: TC

## 2024-09-26 ENCOUNTER — TELEPHONE (OUTPATIENT)
Dept: HEMATOLOGY/ONCOLOGY | Facility: CLINIC | Age: 79
End: 2024-09-26
Payer: OTHER GOVERNMENT

## 2024-09-26 NOTE — TELEPHONE ENCOUNTER
Spoke to the patient regarding referral. Appointment date time and location given per request. PET not scheduled yet per patient. Will update our office once scheduled. All questions answered. TTRN   Updated Taylor NP about patients loose green stools and large bile emesis. Orders to control N/V with IV antinausea meds.

## 2024-09-27 DIAGNOSIS — C15.8 MALIGNANT NEOPLASM OF OVERLAPPING SITES OF ESOPHAGUS: Primary | ICD-10-CM

## 2024-10-07 ENCOUNTER — OFFICE VISIT (OUTPATIENT)
Dept: HEMATOLOGY/ONCOLOGY | Facility: CLINIC | Age: 79
End: 2024-10-07
Payer: OTHER GOVERNMENT

## 2024-10-07 VITALS
SYSTOLIC BLOOD PRESSURE: 131 MMHG | RESPIRATION RATE: 16 BRPM | HEIGHT: 72 IN | DIASTOLIC BLOOD PRESSURE: 7 MMHG | BODY MASS INDEX: 24.35 KG/M2 | WEIGHT: 179.81 LBS | HEART RATE: 79 BPM | OXYGEN SATURATION: 94 %

## 2024-10-07 DIAGNOSIS — C15.9 ESOPHAGEAL ADENOCARCINOMA: Primary | ICD-10-CM

## 2024-10-07 DIAGNOSIS — C16.0 GE JUNCTION CARCINOMA: ICD-10-CM

## 2024-10-07 DIAGNOSIS — C16.0 CARCINOMA OF GASTROESOPHAGEAL JUNCTION: Primary | ICD-10-CM

## 2024-10-07 PROCEDURE — 99205 OFFICE O/P NEW HI 60 MIN: CPT | Mod: S$PBB,,, | Performed by: INTERNAL MEDICINE

## 2024-10-07 RX ORDER — TRAZODONE HYDROCHLORIDE 100 MG/1
100 TABLET ORAL NIGHTLY
COMMUNITY

## 2024-10-07 NOTE — PROGRESS NOTES
MEDICAL ONCOLOGY INITIAL CONSULTATION NOTE    Patient ID: Marcelino Al is a 78 y.o. male.    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  Patient presents to our clinic today for further evaluation      Pathology:    A.  Gastric antrum biopsy:  Chronic gastritis    B.  GE junction biopsy x3:  Moderately differentiated adenocarcinoma in a background of Addison's esophagus, see synoptic.      Synoptic report:  Procedure.  Biopsy  Tumor site: GE junction  Histologic type: Adenocarcinoma  Histologic grade: G2 of G3: Moderately differentiated  Tumor extent: Invades lamina propria: At least  Lymphatic and or vascular invasion: Not identified  Perineural invasion: Not identified  Additional findings: Addison's esophagus    C.  Esophageal mass at 30 cm:  Moderately differentiated adenocarcinoma in a background of Addison's esophagus, see synoptic.    Synoptic report:     Histologic type: Adenocarcinoma  Histologic grade: G2 of G3: Moderately differentiated  Tumor extent: Suspicious for invasion into submucosa  Lymphatic and vascular invasion: Not identified  Perineural invasion: Not identified        Imaging:     Past Medical History:   Diagnosis Date    BPH with urinary obstruction     CAD (coronary artery disease)     Elevated cholesterol     Elevated PSA     Hypertension      Family History   Problem Relation Name Age of Onset    Heart disease Father      Cancer Sister      Heart disease Brother      Heart disease Brother       Social History     Socioeconomic History    Marital status:    Tobacco Use    Smoking status: Former     Types: Cigarettes     Passive exposure: Past    Smokeless tobacco: Never   Substance and Sexual Activity    Alcohol use: Not  Currently     Comment: socially    Drug use: Never    Sexual activity: Yes     Partners: Female     Past Surgical History:   Procedure Laterality Date    CAROTID STENT      EYE SURGERY      HEMORRHOID SURGERY      KNEE SURGERY      right knee replaced     PROSTATE BIOPSY           Review of systems:  Review of Systems   Constitutional:  Negative for activity change, appetite change, chills, diaphoresis, fatigue and unexpected weight change.   HENT:  Negative for congestion, facial swelling, hearing loss, mouth sores, trouble swallowing and voice change.    Eyes:  Negative for photophobia, pain, discharge and itching.   Respiratory:  Negative for apnea, cough, choking, chest tightness and shortness of breath.    Cardiovascular:  Negative for chest pain, palpitations and leg swelling.   Gastrointestinal:  Negative for abdominal distention, abdominal pain, anal bleeding and blood in stool.   Endocrine: Negative for cold intolerance, heat intolerance, polydipsia and polyphagia.   Genitourinary:  Negative for difficulty urinating, dysuria, flank pain and hematuria.   Musculoskeletal:  Negative for arthralgias, back pain, joint swelling, myalgias, neck pain and neck stiffness.   Skin:  Negative for color change, pallor and wound.   Allergic/Immunologic: Negative for environmental allergies, food allergies and immunocompromised state.   Neurological:  Negative for dizziness, seizures, facial asymmetry, speech difficulty, light-headedness, numbness and headaches.   Hematological:  Negative for adenopathy. Does not bruise/bleed easily.   Psychiatric/Behavioral:  Negative for agitation, behavioral problems, confusion, decreased concentration and sleep disturbance.            Physical Exam  Vitals and nursing note reviewed.   Constitutional:       General: He is not in acute distress.     Appearance: Normal appearance. He is not ill-appearing.   HENT:      Head: Normocephalic and atraumatic.      Nose: No congestion or  rhinorrhea.   Eyes:      General: No scleral icterus.     Extraocular Movements: Extraocular movements intact.      Pupils: Pupils are equal, round, and reactive to light.   Cardiovascular:      Rate and Rhythm: Normal rate and regular rhythm.      Pulses: Normal pulses.      Heart sounds: Normal heart sounds. No murmur heard.     No gallop.   Pulmonary:      Effort: Pulmonary effort is normal. No respiratory distress.      Breath sounds: Normal breath sounds. No stridor. No wheezing or rhonchi.   Abdominal:      General: Bowel sounds are normal. There is no distension.      Palpations: There is no mass.      Tenderness: There is no abdominal tenderness. There is no guarding.   Musculoskeletal:         General: No swelling, tenderness, deformity or signs of injury. Normal range of motion.      Cervical back: Normal range of motion and neck supple. No rigidity. No muscular tenderness.      Right lower leg: No edema.      Left lower leg: No edema.   Skin:     General: Skin is warm.      Coloration: Skin is not jaundiced or pale.      Findings: No bruising or lesion.   Neurological:      General: No focal deficit present.      Mental Status: He is alert and oriented to person, place, and time.      Cranial Nerves: No cranial nerve deficit.      Sensory: No sensory deficit.      Motor: No weakness.      Gait: Gait normal.   Psychiatric:         Mood and Affect: Mood normal.         Behavior: Behavior normal.         Thought Content: Thought content normal.       Vitals:    10/07/24 1012   BP: (!) 131/7   Pulse: 79   Resp: 16   Body surface area is 2.04 meters squared.    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  I will obtain PET scan to complete staging and to rule out distant metastatic disease.  If no evidence of distant metastatic disease then plan would be for chemo XRT approach likely considering his  age.  If localized disease then patient would need EUS staging for completion.  Will also make referral to Radiation Oncology.    Plan:  PET scan for staging  Referral to Radiation Oncology  TMB discussion         Total time spent in counseling and discussion about further management options including relevant lab work, treatment,  prognosis, medications and intended side effects was more than 60 minutes. More than 50% of the time was spent on counseling and coordination of care.  I spent a total of 60 minutes on the day of the visit.This includes face to face time and non-face to face time preparing to see the patient (eg, review of tests), Obtaining and/or reviewing separately obtained history, Documenting clinical information in the electronic or other health record, Independently interpreting resultsand communicating results to the patient/family/caregiver, or Care coordination.

## 2024-10-10 ENCOUNTER — LAB VISIT (OUTPATIENT)
Dept: LAB | Facility: HOSPITAL | Age: 79
End: 2024-10-10
Attending: SURGERY
Payer: OTHER GOVERNMENT

## 2024-10-10 DIAGNOSIS — C15.8 MALIGNANT NEOPLASM OF OVERLAPPING SITES OF ESOPHAGUS: Primary | ICD-10-CM

## 2024-10-10 LAB
ALBUMIN SERPL-MCNC: 4 G/DL (ref 3.4–4.8)
ALBUMIN/GLOB SERPL: 1.3 RATIO (ref 1.1–2)
ALP SERPL-CCNC: 80 UNIT/L (ref 40–150)
ALT SERPL-CCNC: 18 UNIT/L (ref 0–55)
ANION GAP SERPL CALC-SCNC: 8 MEQ/L
APTT PPP: 25.4 SECONDS (ref 24.6–37.2)
AST SERPL-CCNC: 17 UNIT/L (ref 5–34)
BASOPHILS # BLD AUTO: 0.05 X10(3)/MCL
BASOPHILS NFR BLD AUTO: 0.8 %
BILIRUB SERPL-MCNC: 0.9 MG/DL
BUN SERPL-MCNC: 13 MG/DL (ref 8.4–25.7)
CALCIUM SERPL-MCNC: 9.7 MG/DL (ref 8.8–10)
CHLORIDE SERPL-SCNC: 105 MMOL/L (ref 98–107)
CO2 SERPL-SCNC: 26 MMOL/L (ref 23–31)
CREAT SERPL-MCNC: 0.94 MG/DL (ref 0.73–1.18)
CREAT/UREA NIT SERPL: 14
EOSINOPHIL # BLD AUTO: 0.23 X10(3)/MCL (ref 0–0.9)
EOSINOPHIL NFR BLD AUTO: 3.7 %
ERYTHROCYTE [DISTWIDTH] IN BLOOD BY AUTOMATED COUNT: 13.1 % (ref 11.5–17)
GFR SERPLBLD CREATININE-BSD FMLA CKD-EPI: >60 ML/MIN/1.73/M2
GLOBULIN SER-MCNC: 3.2 GM/DL (ref 2.4–3.5)
GLUCOSE SERPL-MCNC: 108 MG/DL (ref 82–115)
HCT VFR BLD AUTO: 42 % (ref 42–52)
HGB BLD-MCNC: 13.5 G/DL (ref 14–18)
IMM GRANULOCYTES # BLD AUTO: 0.03 X10(3)/MCL (ref 0–0.04)
IMM GRANULOCYTES NFR BLD AUTO: 0.5 %
INR PPP: 1
LYMPHOCYTES # BLD AUTO: 1.48 X10(3)/MCL (ref 0.6–4.6)
LYMPHOCYTES NFR BLD AUTO: 24 %
MCH RBC QN AUTO: 30 PG (ref 27–31)
MCHC RBC AUTO-ENTMCNC: 32.1 G/DL (ref 33–36)
MCV RBC AUTO: 93.3 FL (ref 80–94)
MONOCYTES # BLD AUTO: 0.63 X10(3)/MCL (ref 0.1–1.3)
MONOCYTES NFR BLD AUTO: 10.2 %
NEUTROPHILS # BLD AUTO: 3.75 X10(3)/MCL (ref 2.1–9.2)
NEUTROPHILS NFR BLD AUTO: 60.8 %
PLATELET # BLD AUTO: 195 X10(3)/MCL (ref 130–400)
PMV BLD AUTO: 11 FL (ref 7.4–10.4)
POTASSIUM SERPL-SCNC: 4.2 MMOL/L (ref 3.5–5.1)
PROT SERPL-MCNC: 7.2 GM/DL (ref 5.8–7.6)
PROTHROMBIN TIME: 13.7 SECONDS (ref 12.5–14.5)
RBC # BLD AUTO: 4.5 X10(6)/MCL (ref 4.7–6.1)
SODIUM SERPL-SCNC: 139 MMOL/L (ref 136–145)
WBC # BLD AUTO: 6.17 X10(3)/MCL (ref 4.5–11.5)

## 2024-10-10 PROCEDURE — 85025 COMPLETE CBC W/AUTO DIFF WBC: CPT

## 2024-10-10 PROCEDURE — 36415 COLL VENOUS BLD VENIPUNCTURE: CPT

## 2024-10-10 PROCEDURE — 85730 THROMBOPLASTIN TIME PARTIAL: CPT

## 2024-10-10 PROCEDURE — 80053 COMPREHEN METABOLIC PANEL: CPT

## 2024-10-10 PROCEDURE — 85610 PROTHROMBIN TIME: CPT

## 2024-10-10 RX ORDER — TRAMADOL HYDROCHLORIDE 50 MG/1
1 TABLET ORAL DAILY PRN
COMMUNITY
Start: 2024-08-20

## 2024-10-10 RX ORDER — ASPIRIN 81 MG/1
81 TABLET ORAL EVERY MORNING
COMMUNITY

## 2024-10-10 NOTE — DISCHARGE INSTRUCTIONS
Nothing to eat or drink after midnight. Stop Aspirin 10/18/24. Take Losartan AM of procedure with small sip of water.                         DR. WANG POST OP INSTRUCTIONS       STARTING TOMORROW, SHOWER NO BATHS. CLEAN INCISIONS DAILY   WITH SOAP AND WATER. KEEP CLEAN AND DRY. NO HEAVY LIFTING OR DRIVING   UNTIL RELEASED BY DR WANG. NOTIFY YOUR DOCTOR WITH ANY FEVER   GREATER THAN 101, REDNESS OR DRAINAGE AT INCISION SITE, EXCESSIVE PAIN,                                  OR  EXCESSIVE BLEEDING .

## 2024-10-11 ENCOUNTER — HOSPITAL ENCOUNTER (OUTPATIENT)
Dept: RADIOLOGY | Facility: HOSPITAL | Age: 79
Discharge: HOME | End: 2024-10-11
Attending: SURGERY
Payer: OTHER GOVERNMENT

## 2024-10-11 DIAGNOSIS — C15.8 MALIGNANT NEOPLASM OF OVERLAPPING SITES OF ESOPHAGUS: ICD-10-CM

## 2024-10-11 PROCEDURE — A9552 F18 FDG: HCPCS | Performed by: SURGERY

## 2024-10-11 PROCEDURE — 78815 PET IMAGE W/CT SKULL-THIGH: CPT | Mod: TC

## 2024-10-11 RX ORDER — FLUDEOXYGLUCOSE F18 500 MCI/ML
10 INJECTION INTRAVENOUS
Status: COMPLETED | OUTPATIENT
Start: 2024-10-11 | End: 2024-10-11

## 2024-10-11 RX ADMIN — FLUDEOXYGLUCOSE F-18 11 MILLICURIE: 500 INJECTION INTRAVENOUS at 08:10

## 2024-10-13 PROBLEM — C16.0 GE JUNCTION CARCINOMA: Status: ACTIVE | Noted: 2024-10-13

## 2024-10-15 ENCOUNTER — TELEPHONE (OUTPATIENT)
Dept: HEMATOLOGY/ONCOLOGY | Facility: CLINIC | Age: 79
End: 2024-10-15
Payer: OTHER GOVERNMENT

## 2024-10-15 ENCOUNTER — LAB VISIT (OUTPATIENT)
Dept: LAB | Facility: HOSPITAL | Age: 79
End: 2024-10-15
Attending: INTERNAL MEDICINE
Payer: OTHER GOVERNMENT

## 2024-10-15 DIAGNOSIS — C16.0 GE JUNCTION CARCINOMA: ICD-10-CM

## 2024-10-15 DIAGNOSIS — C15.9 MALIGNANT NEOPLASM OF ESOPHAGUS, UNSPECIFIED LOCATION: Primary | ICD-10-CM

## 2024-10-15 DIAGNOSIS — C15.9 ESOPHAGEAL ADENOCARCINOMA: Primary | ICD-10-CM

## 2024-10-15 LAB
ALBUMIN SERPL-MCNC: 3.9 G/DL (ref 3.4–4.8)
ALBUMIN/GLOB SERPL: 1.3 RATIO (ref 1.1–2)
ALP SERPL-CCNC: 79 UNIT/L (ref 40–150)
ALT SERPL-CCNC: 18 UNIT/L (ref 0–55)
ANION GAP SERPL CALC-SCNC: 9 MEQ/L
AST SERPL-CCNC: 20 UNIT/L (ref 5–34)
BASOPHILS # BLD AUTO: 0.05 X10(3)/MCL
BASOPHILS NFR BLD AUTO: 0.6 %
BILIRUB SERPL-MCNC: 0.6 MG/DL
BUN SERPL-MCNC: 11 MG/DL (ref 8.4–25.7)
CALCIUM SERPL-MCNC: 9.6 MG/DL (ref 8.8–10)
CHLORIDE SERPL-SCNC: 105 MMOL/L (ref 98–107)
CO2 SERPL-SCNC: 29 MMOL/L (ref 23–31)
CREAT SERPL-MCNC: 0.84 MG/DL (ref 0.73–1.18)
CREAT/UREA NIT SERPL: 13
EOSINOPHIL # BLD AUTO: 0.27 X10(3)/MCL (ref 0–0.9)
EOSINOPHIL NFR BLD AUTO: 3.3 %
ERYTHROCYTE [DISTWIDTH] IN BLOOD BY AUTOMATED COUNT: 13.2 % (ref 11.5–17)
GFR SERPLBLD CREATININE-BSD FMLA CKD-EPI: >60 ML/MIN/1.73/M2
GLOBULIN SER-MCNC: 3.1 GM/DL (ref 2.4–3.5)
GLUCOSE SERPL-MCNC: 101 MG/DL (ref 82–115)
HCT VFR BLD AUTO: 40.2 % (ref 42–52)
HGB BLD-MCNC: 13 G/DL (ref 14–18)
IMM GRANULOCYTES # BLD AUTO: 0.02 X10(3)/MCL (ref 0–0.04)
IMM GRANULOCYTES NFR BLD AUTO: 0.2 %
LYMPHOCYTES # BLD AUTO: 1.46 X10(3)/MCL (ref 0.6–4.6)
LYMPHOCYTES NFR BLD AUTO: 18 %
MCH RBC QN AUTO: 30.1 PG (ref 27–31)
MCHC RBC AUTO-ENTMCNC: 32.3 G/DL (ref 33–36)
MCV RBC AUTO: 93.1 FL (ref 80–94)
MONOCYTES # BLD AUTO: 0.82 X10(3)/MCL (ref 0.1–1.3)
MONOCYTES NFR BLD AUTO: 10.1 %
NEUTROPHILS # BLD AUTO: 5.48 X10(3)/MCL (ref 2.1–9.2)
NEUTROPHILS NFR BLD AUTO: 67.8 %
PLATELET # BLD AUTO: 194 X10(3)/MCL (ref 130–400)
PMV BLD AUTO: 10.6 FL (ref 7.4–10.4)
POTASSIUM SERPL-SCNC: 3.6 MMOL/L (ref 3.5–5.1)
PROT SERPL-MCNC: 7 GM/DL (ref 5.8–7.6)
RBC # BLD AUTO: 4.32 X10(6)/MCL (ref 4.7–6.1)
SODIUM SERPL-SCNC: 143 MMOL/L (ref 136–145)
WBC # BLD AUTO: 8.1 X10(3)/MCL (ref 4.5–11.5)

## 2024-10-15 PROCEDURE — 85025 COMPLETE CBC W/AUTO DIFF WBC: CPT

## 2024-10-15 PROCEDURE — 80053 COMPREHEN METABOLIC PANEL: CPT

## 2024-10-15 PROCEDURE — 36415 COLL VENOUS BLD VENIPUNCTURE: CPT

## 2024-10-17 ENCOUNTER — DOCUMENTATION ONLY (OUTPATIENT)
Dept: HEMATOLOGY/ONCOLOGY | Facility: CLINIC | Age: 79
End: 2024-10-17
Payer: OTHER GOVERNMENT

## 2024-10-18 ENCOUNTER — ANESTHESIA EVENT (OUTPATIENT)
Dept: SURGERY | Facility: HOSPITAL | Age: 79
End: 2024-10-18
Payer: OTHER GOVERNMENT

## 2024-10-18 NOTE — ANESTHESIA PREPROCEDURE EVALUATION
10/18/2024  Marcelino Al is a 78 y.o., male.      Pre-op Assessment    I have reviewed the Patient Summary Reports.     I have reviewed the Nursing Notes. I have reviewed the NPO Status.   I have reviewed the Medications.     Review of Systems  Anesthesia Hx:             Denies Family Hx of Anesthesia complications.    Denies Personal Hx of Anesthesia complications.                    Social:  Former Smoker, No Alcohol Use       Hematology/Oncology:    Oncology Normal    -- Anemia:                                  EENT/Dental:  EENT/Dental Normal           Cardiovascular:     Hypertension, well controlled   CAD  asymptomatic            ECG has been reviewed.                            Pulmonary:        Sleep Apnea                Renal/:  Renal/ Normal                 Hepatic/GI:  Hepatic/GI Normal                    Musculoskeletal:  Musculoskeletal Normal                Neurological:  Neurology Normal                                      Endocrine:  Endocrine Normal            Dermatological:  Skin Normal    Psych:  Psychiatric Normal                    Physical Exam  General: Cooperative, Alert and Oriented    Airway:  Mallampati: II   Mouth Opening: Normal  TM Distance: Normal  Tongue: Normal  Neck ROM: Normal ROM    Dental:  Intact        Anesthesia Plan  Type of Anesthesia, risks & benefits discussed:    Anesthesia Type: Gen Natural Airway  Intra-op Monitoring Plan: Standard ASA Monitors  Post Op Pain Control Plan:   (medical reason for not using multimodal pain management)  Induction:  IV  Informed Consent: Informed consent signed with the Patient and all parties understand the risks and agree with anesthesia plan.  All questions answered. Patient consented to blood products? Yes  ASA Score: 3    Ready For Surgery From Anesthesia Perspective.     .

## 2024-10-21 ENCOUNTER — HOSPITAL ENCOUNTER (OUTPATIENT)
Facility: HOSPITAL | Age: 79
Discharge: HOME | End: 2024-10-21
Attending: SURGERY | Admitting: SURGERY
Payer: OTHER GOVERNMENT

## 2024-10-21 ENCOUNTER — ANESTHESIA (OUTPATIENT)
Dept: SURGERY | Facility: HOSPITAL | Age: 79
End: 2024-10-21
Payer: OTHER GOVERNMENT

## 2024-10-21 VITALS
OXYGEN SATURATION: 95 % | HEIGHT: 72 IN | SYSTOLIC BLOOD PRESSURE: 126 MMHG | HEART RATE: 65 BPM | BODY MASS INDEX: 24.36 KG/M2 | TEMPERATURE: 98 F | RESPIRATION RATE: 18 BRPM | WEIGHT: 179.88 LBS | DIASTOLIC BLOOD PRESSURE: 67 MMHG

## 2024-10-21 DIAGNOSIS — C15.9 ESOPHAGEAL CANCER: ICD-10-CM

## 2024-10-21 PROCEDURE — 63600175 PHARM REV CODE 636 W HCPCS: Performed by: NURSE ANESTHETIST, CERTIFIED REGISTERED

## 2024-10-21 PROCEDURE — 37000008 HC ANESTHESIA 1ST 15 MINUTES: Performed by: SURGERY

## 2024-10-21 PROCEDURE — 36000706: Performed by: SURGERY

## 2024-10-21 PROCEDURE — 37000009 HC ANESTHESIA EA ADD 15 MINS: Performed by: SURGERY

## 2024-10-21 PROCEDURE — 71000015 HC POSTOP RECOV 1ST HR: Performed by: SURGERY

## 2024-10-21 PROCEDURE — 71000016 HC POSTOP RECOV ADDL HR: Performed by: SURGERY

## 2024-10-21 PROCEDURE — 63600175 PHARM REV CODE 636 W HCPCS: Mod: JZ,JG | Performed by: SURGERY

## 2024-10-21 PROCEDURE — 36000707: Performed by: SURGERY

## 2024-10-21 PROCEDURE — C1788 PORT, INDWELLING, IMP: HCPCS | Performed by: SURGERY

## 2024-10-21 PROCEDURE — 63600175 PHARM REV CODE 636 W HCPCS: Performed by: ANESTHESIOLOGY

## 2024-10-21 PROCEDURE — D9220A PRA ANESTHESIA: Mod: ,,, | Performed by: NURSE ANESTHETIST, CERTIFIED REGISTERED

## 2024-10-21 PROCEDURE — 63600175 PHARM REV CODE 636 W HCPCS: Performed by: SURGERY

## 2024-10-21 DEVICE — POWERPORT M.R.I. IMPLANTABLE PORT WITH ATTACHABLE 8F CHRONOFLEX OPEN-ENDED SINGLE-LUMEN VENOUS CATHETER INTERMEDIATE KIT  (WITH SUTURE PLUGS)
Type: IMPLANTABLE DEVICE | Site: CHEST  WALL | Status: FUNCTIONAL
Brand: POWERPORT M.R.I., CHRONOFLEX

## 2024-10-21 RX ORDER — LIDOCAINE HYDROCHLORIDE 20 MG/ML
INJECTION, SOLUTION EPIDURAL; INFILTRATION; INTRACAUDAL; PERINEURAL
Status: DISCONTINUED | OUTPATIENT
Start: 2024-10-21 | End: 2024-10-21

## 2024-10-21 RX ORDER — LIDOCAINE HYDROCHLORIDE 10 MG/ML
INJECTION, SOLUTION EPIDURAL; INFILTRATION; INTRACAUDAL; PERINEURAL
Status: DISCONTINUED | OUTPATIENT
Start: 2024-10-21 | End: 2024-10-21 | Stop reason: HOSPADM

## 2024-10-21 RX ORDER — FENTANYL CITRATE 50 UG/ML
INJECTION, SOLUTION INTRAMUSCULAR; INTRAVENOUS
Status: DISCONTINUED | OUTPATIENT
Start: 2024-10-21 | End: 2024-10-21

## 2024-10-21 RX ORDER — HEPARIN SODIUM 5000 [USP'U]/ML
INJECTION, SOLUTION INTRAVENOUS; SUBCUTANEOUS
Status: DISCONTINUED | OUTPATIENT
Start: 2024-10-21 | End: 2024-10-21 | Stop reason: HOSPADM

## 2024-10-21 RX ORDER — BUPIVACAINE HYDROCHLORIDE 5 MG/ML
INJECTION, SOLUTION EPIDURAL; INTRACAUDAL
Status: DISCONTINUED | OUTPATIENT
Start: 2024-10-21 | End: 2024-10-21 | Stop reason: HOSPADM

## 2024-10-21 RX ORDER — PROPOFOL 10 MG/ML
VIAL (ML) INTRAVENOUS
Status: DISCONTINUED | OUTPATIENT
Start: 2024-10-21 | End: 2024-10-21

## 2024-10-21 RX ORDER — MIDAZOLAM HYDROCHLORIDE 1 MG/ML
INJECTION INTRAMUSCULAR; INTRAVENOUS
Status: DISCONTINUED | OUTPATIENT
Start: 2024-10-21 | End: 2024-10-21

## 2024-10-21 RX ORDER — SODIUM CHLORIDE, SODIUM LACTATE, POTASSIUM CHLORIDE, CALCIUM CHLORIDE 600; 310; 30; 20 MG/100ML; MG/100ML; MG/100ML; MG/100ML
INJECTION, SOLUTION INTRAVENOUS CONTINUOUS
Status: DISCONTINUED | OUTPATIENT
Start: 2024-10-21 | End: 2024-10-21 | Stop reason: HOSPADM

## 2024-10-21 RX ORDER — CEFAZOLIN SODIUM 1 G/3ML
2 INJECTION, POWDER, FOR SOLUTION INTRAMUSCULAR; INTRAVENOUS
Status: COMPLETED | OUTPATIENT
Start: 2024-10-21 | End: 2024-10-21

## 2024-10-21 RX ADMIN — FENTANYL CITRATE 50 MCG: 50 INJECTION, SOLUTION INTRAMUSCULAR; INTRAVENOUS at 10:10

## 2024-10-21 RX ADMIN — CEFAZOLIN 2 G: 330 INJECTION, POWDER, FOR SOLUTION INTRAMUSCULAR; INTRAVENOUS at 10:10

## 2024-10-21 RX ADMIN — MIDAZOLAM 2 MG: 1 INJECTION INTRAMUSCULAR; INTRAVENOUS at 09:10

## 2024-10-21 RX ADMIN — PROPOFOL 20 MG: 10 INJECTION, EMULSION INTRAVENOUS at 10:10

## 2024-10-21 RX ADMIN — SODIUM CHLORIDE, POTASSIUM CHLORIDE, SODIUM LACTATE AND CALCIUM CHLORIDE: 600; 310; 30; 20 INJECTION, SOLUTION INTRAVENOUS at 08:10

## 2024-10-21 RX ADMIN — LIDOCAINE HYDROCHLORIDE 100 MG: 20 INJECTION, SOLUTION EPIDURAL; INFILTRATION; INTRACAUDAL; PERINEURAL at 10:10

## 2024-10-21 RX ADMIN — PROPOFOL 50 MG: 10 INJECTION, EMULSION INTRAVENOUS at 10:10

## 2024-10-21 RX ADMIN — FENTANYL CITRATE 50 MCG: 50 INJECTION, SOLUTION INTRAMUSCULAR; INTRAVENOUS at 09:10

## 2024-10-21 NOTE — OP NOTE
[facility] - Periop Services  Operative Note      Date of Procedure: 10/21/2024     Procedure: Procedure(s) (LRB):  INSERTION, PORT-A-CATH (Do 1st Pt. has Oncologist appt. after) (Right)   Right IJ PowerPort inserted  Surgeons and Role:     * Moy Ruvalcaba MD - Primary    Assisting Surgeon: None    Pre-Operative Diagnosis: Cancer of overlapping sites of esophagus [C15.8]    Post-Operative Diagnosis: Post-Op Diagnosis Codes:     * Cancer of overlapping sites of esophagus [C15.8]  Right IJ PowerPort inserted  Anesthesia: General    Operative Findings (including complications, if any):  Patient is a 78-year-old male with a history of hypertension hypercholesterolemia gastroesophageal reflux disease status post partial prostatectomy in 2021 smokes a pack a day quit about 30 years ago.  Patient needed a PowerPort placement for chemotherapy.  Patient had a recent EGD that demonstrated a 7 cm hiatal hernia with the esophageal tumor between 35 and 30 cm.  Patient has a prior history of Barretts esophagus.  The biopsies were consistent with a moderately differentiated adenocarcinoma.  Patient needed a MediPort for preoperative chemotherapy prior to consideration for possible surgical resection.  PET-CT scan is consistent with a distal esophageal mass with metastatic paraesophageal and mediastinal lymph nodes but no other significant findings on 10/11/2024.  Patient was brought to the OR supine position general anesthetic prepped and draped in a sterile fashion.      Patient was in supine position neck neck is full extension and had a Finders needle access of the right internal jugular vein with insertion of vascular access needle and a guidewire was placed.  Patient had a dilator and a peel-away sheath placed under fluoroscopy fluoroscopic guidance.  PowerPort was placed on the right anterior chest wall it was sutured in 4 locations with Ethibond to the preperitoneal fascia.  Patient had a tunneled to the  insertion site of the right IJ.  Patient had closure of the skin and subQ with 3-0 Vicryl.  4-0 plain gut suture was used for the skin.  Sterile dressings were applied no problems were encountered patient tolerated procedure well.  Insertion site of the right IJ was also closed with 3-0 Vicryl and 4-0 plain gut suture as well as Dermabond.  good blood return was noted no pulsatile flow was noted coloration of blood was appropriate.  Chest x-ray was obtained to verify positioning.        Description of Technical Procedures:  Noted above       Significant Surgical Tasks Conducted by the Assistant(s), if Applicable:  None       Estimated Blood Loss (EBL): 0 mL           Implants:   Implant Name Type Inv. Item Serial No.  Lot No. LRB No. Used Action   KIT POWERPORT SINGLE 8FR - [id number]  KIT POWERPORT SINGLE 8FR  [employer] [id number] Right 1 Implanted       Specimens:   Specimen (24h ago, onward)      None                    Condition: Good    Disposition: PACU - hemodynamically stable.    Attestation: I was present and scrubbed for the entire procedure.    Discharge Note    OUTCOME: Patient tolerated treatment/procedure well without complication and is now ready for discharge.            DISPOSITION: Home or Self Care    FINAL DIAGNOSIS:  Adenocarcinoma of the esophagus  Right IJ PowerPort insertion  FOLLOWUP: In clinic 1 week    DISCHARGE INSTRUCTIONS:  No discharge procedures on file.     Clinical Reference Documents Added to Patient Instructions         Document    PORTACAT DISCHARGE INSTRUCTIONS (ENGLISH)

## 2024-10-21 NOTE — ANESTHESIA POSTPROCEDURE EVALUATION
Anesthesia Post Evaluation    Patient: Marcelino Al    Procedure(s) Performed: Procedure(s) (LRB):  INSERTION, PORT-A-CATH (Do 1st Pt. has Oncologist appt. after) (Right)    Final Anesthesia Type: general      Patient location during evaluation: OPS  Patient participation: Yes- Able to Participate  Level of consciousness: awake and alert  Post-procedure vital signs: reviewed and stable  Pain management: adequate  Airway patency: patent  KORY mitigation strategies: Multimodal analgesia  PONV status at discharge: No PONV  Anesthetic complications: no      Cardiovascular status: blood pressure returned to baseline  Respiratory status: unassisted  Hydration status: euvolemic  Follow-up not needed.              Vitals Value Taken Time   /85 10/21/24 0811   Temp 36.4 °C (97.6 °F) 10/21/24 0810   Pulse 69 10/21/24 0811   Resp 18 10/21/24 0810   SpO2 97 % 10/21/24 0811         No case tracking events are documented in the log.      Pain/Hallie Score: No data recorded

## 2024-10-22 ENCOUNTER — OFFICE VISIT (OUTPATIENT)
Dept: HEMATOLOGY/ONCOLOGY | Facility: CLINIC | Age: 79
End: 2024-10-22
Payer: OTHER GOVERNMENT

## 2024-10-22 VITALS
BODY MASS INDEX: 24.03 KG/M2 | HEART RATE: 75 BPM | DIASTOLIC BLOOD PRESSURE: 68 MMHG | WEIGHT: 177.38 LBS | HEIGHT: 72 IN | RESPIRATION RATE: 16 BRPM | SYSTOLIC BLOOD PRESSURE: 127 MMHG | OXYGEN SATURATION: 95 %

## 2024-10-22 DIAGNOSIS — C16.0 GE JUNCTION CARCINOMA: ICD-10-CM

## 2024-10-22 DIAGNOSIS — C15.9 ESOPHAGEAL ADENOCARCINOMA: Primary | ICD-10-CM

## 2024-10-22 PROCEDURE — 99215 OFFICE O/P EST HI 40 MIN: CPT | Mod: S$PBB,,, | Performed by: INTERNAL MEDICINE

## 2024-10-23 ENCOUNTER — HOSPITAL ENCOUNTER (EMERGENCY)
Facility: HOSPITAL | Age: 79
Discharge: HOME | End: 2024-10-24
Attending: EMERGENCY MEDICINE
Payer: OTHER GOVERNMENT

## 2024-10-23 ENCOUNTER — HOSPITAL ENCOUNTER (OUTPATIENT)
Dept: RADIOLOGY | Facility: HOSPITAL | Age: 79
Discharge: HOME | End: 2024-10-23
Attending: SURGERY
Payer: OTHER GOVERNMENT

## 2024-10-23 DIAGNOSIS — E86.0 DEHYDRATION: Primary | ICD-10-CM

## 2024-10-23 DIAGNOSIS — R55 SYNCOPE: ICD-10-CM

## 2024-10-23 DIAGNOSIS — R13.10 DYSPHAGIA, UNSPECIFIED TYPE: ICD-10-CM

## 2024-10-23 PROCEDURE — 99285 EMERGENCY DEPT VISIT HI MDM: CPT | Mod: 25

## 2024-10-23 PROCEDURE — A9698 NON-RAD CONTRAST MATERIALNOC: HCPCS | Performed by: SURGERY

## 2024-10-23 PROCEDURE — 74248 X-RAY SM INT F-THRU STD: CPT | Mod: TC

## 2024-10-23 PROCEDURE — 74240 X-RAY XM UPR GI TRC 1CNTRST: CPT | Mod: TC

## 2024-10-23 PROCEDURE — 25500020 PHARM REV CODE 255: Performed by: SURGERY

## 2024-10-23 RX ADMIN — BARIUM SULFATE 135 ML: 980 POWDER, FOR SUSPENSION ORAL at 08:10

## 2024-10-24 VITALS
WEIGHT: 175 LBS | TEMPERATURE: 98 F | RESPIRATION RATE: 16 BRPM | OXYGEN SATURATION: 96 % | SYSTOLIC BLOOD PRESSURE: 123 MMHG | HEIGHT: 72 IN | HEART RATE: 73 BPM | BODY MASS INDEX: 23.7 KG/M2 | DIASTOLIC BLOOD PRESSURE: 64 MMHG

## 2024-10-24 LAB
ALBUMIN SERPL-MCNC: 3.6 G/DL (ref 3.4–4.8)
ALBUMIN/GLOB SERPL: 1.2 RATIO (ref 1.1–2)
ALP SERPL-CCNC: 72 UNIT/L (ref 40–150)
ALT SERPL-CCNC: 15 UNIT/L (ref 0–55)
AMORPH URATE CRY URNS QL MICRO: ABNORMAL /HPF
ANION GAP SERPL CALC-SCNC: 11 MEQ/L
AST SERPL-CCNC: 15 UNIT/L (ref 5–34)
BACTERIA #/AREA URNS AUTO: ABNORMAL /HPF
BASOPHILS # BLD AUTO: 0.07 X10(3)/MCL
BASOPHILS NFR BLD AUTO: 0.5 %
BILIRUB SERPL-MCNC: 1.1 MG/DL
BILIRUB UR QL STRIP.AUTO: ABNORMAL
BNP BLD-MCNC: <10 PG/ML
BUN SERPL-MCNC: 16 MG/DL (ref 8.4–25.7)
CALCIUM SERPL-MCNC: 9.5 MG/DL (ref 8.8–10)
CHLORIDE SERPL-SCNC: 97 MMOL/L (ref 98–107)
CLARITY UR: ABNORMAL
CO2 SERPL-SCNC: 27 MMOL/L (ref 23–31)
COLOR UR AUTO: ABNORMAL
CREAT SERPL-MCNC: 1.24 MG/DL (ref 0.72–1.25)
CREAT/UREA NIT SERPL: 13
EOSINOPHIL # BLD AUTO: 0.35 X10(3)/MCL (ref 0–0.9)
EOSINOPHIL NFR BLD AUTO: 2.7 %
ERYTHROCYTE [DISTWIDTH] IN BLOOD BY AUTOMATED COUNT: 13.3 % (ref 11.5–17)
GFR SERPLBLD CREATININE-BSD FMLA CKD-EPI: 59 ML/MIN/1.73/M2
GLOBULIN SER-MCNC: 2.9 GM/DL (ref 2.4–3.5)
GLUCOSE SERPL-MCNC: 139 MG/DL (ref 82–115)
GLUCOSE UR QL STRIP: NEGATIVE
HCT VFR BLD AUTO: 45.6 % (ref 42–52)
HGB BLD-MCNC: 15 G/DL (ref 14–18)
HGB UR QL STRIP: NEGATIVE
HYALINE CASTS URNS QL MICRO: ABNORMAL /LPF
IMM GRANULOCYTES # BLD AUTO: 0.13 X10(3)/MCL (ref 0–0.04)
IMM GRANULOCYTES NFR BLD AUTO: 1 %
KETONES UR QL STRIP: ABNORMAL
LEUKOCYTE ESTERASE UR QL STRIP: NEGATIVE
LYMPHOCYTES # BLD AUTO: 1.63 X10(3)/MCL (ref 0.6–4.6)
LYMPHOCYTES NFR BLD AUTO: 12.5 %
MAGNESIUM SERPL-MCNC: 1.9 MG/DL (ref 1.6–2.6)
MCH RBC QN AUTO: 30.2 PG (ref 27–31)
MCHC RBC AUTO-ENTMCNC: 32.9 G/DL (ref 33–36)
MCV RBC AUTO: 91.9 FL (ref 80–94)
MONOCYTES # BLD AUTO: 1.33 X10(3)/MCL (ref 0.1–1.3)
MONOCYTES NFR BLD AUTO: 10.2 %
MUCOUS THREADS URNS QL MICRO: ABNORMAL /LPF
NEUTROPHILS # BLD AUTO: 9.57 X10(3)/MCL (ref 2.1–9.2)
NEUTROPHILS NFR BLD AUTO: 73.1 %
NITRITE UR QL STRIP: NEGATIVE
OHS QRS DURATION: 76 MS
OHS QTC CALCULATION: 434 MS
PH UR STRIP: 5.5 [PH]
PLATELET # BLD AUTO: 195 X10(3)/MCL (ref 130–400)
PMV BLD AUTO: 10 FL (ref 7.4–10.4)
POTASSIUM SERPL-SCNC: 4.6 MMOL/L (ref 3.5–5.1)
PROT SERPL-MCNC: 6.5 GM/DL (ref 5.8–7.6)
PROT UR QL STRIP: ABNORMAL
RBC # BLD AUTO: 4.96 X10(6)/MCL (ref 4.7–6.1)
RBC #/AREA URNS AUTO: ABNORMAL /HPF
SODIUM SERPL-SCNC: 135 MMOL/L (ref 136–145)
SP GR UR STRIP.AUTO: >=1.03 (ref 1–1.03)
SQUAMOUS #/AREA URNS AUTO: ABNORMAL /HPF
TROPONIN I SERPL-MCNC: <0.01 NG/ML (ref 0–0.04)
UROBILINOGEN UR STRIP-ACNC: 1
WBC # BLD AUTO: 13.08 X10(3)/MCL (ref 4.5–11.5)
WBC #/AREA URNS AUTO: ABNORMAL /HPF

## 2024-10-24 PROCEDURE — 85025 COMPLETE CBC W/AUTO DIFF WBC: CPT | Performed by: EMERGENCY MEDICINE

## 2024-10-24 PROCEDURE — 25000003 PHARM REV CODE 250: Performed by: EMERGENCY MEDICINE

## 2024-10-24 PROCEDURE — 81001 URINALYSIS AUTO W/SCOPE: CPT | Performed by: EMERGENCY MEDICINE

## 2024-10-24 PROCEDURE — 81003 URINALYSIS AUTO W/O SCOPE: CPT | Performed by: EMERGENCY MEDICINE

## 2024-10-24 PROCEDURE — 93005 ELECTROCARDIOGRAM TRACING: CPT

## 2024-10-24 PROCEDURE — 83735 ASSAY OF MAGNESIUM: CPT | Performed by: EMERGENCY MEDICINE

## 2024-10-24 PROCEDURE — 80053 COMPREHEN METABOLIC PANEL: CPT | Performed by: EMERGENCY MEDICINE

## 2024-10-24 PROCEDURE — 93010 ELECTROCARDIOGRAM REPORT: CPT | Mod: ,,, | Performed by: INTERNAL MEDICINE

## 2024-10-24 PROCEDURE — 84484 ASSAY OF TROPONIN QUANT: CPT | Performed by: EMERGENCY MEDICINE

## 2024-10-24 PROCEDURE — 83880 ASSAY OF NATRIURETIC PEPTIDE: CPT | Performed by: EMERGENCY MEDICINE

## 2024-10-24 RX ORDER — DICYCLOMINE HYDROCHLORIDE 10 MG/1
20 CAPSULE ORAL
Status: COMPLETED | OUTPATIENT
Start: 2024-10-24 | End: 2024-10-24

## 2024-10-24 RX ADMIN — DICYCLOMINE HYDROCHLORIDE 20 MG: 10 CAPSULE ORAL at 02:10

## 2024-10-30 ENCOUNTER — CLINICAL SUPPORT (OUTPATIENT)
Dept: HEMATOLOGY/ONCOLOGY | Facility: CLINIC | Age: 79
End: 2024-10-30
Payer: OTHER GOVERNMENT

## 2024-10-30 VITALS
HEIGHT: 72 IN | HEART RATE: 69 BPM | TEMPERATURE: 98 F | WEIGHT: 179.13 LBS | RESPIRATION RATE: 16 BRPM | BODY MASS INDEX: 24.26 KG/M2 | DIASTOLIC BLOOD PRESSURE: 73 MMHG | OXYGEN SATURATION: 97 % | SYSTOLIC BLOOD PRESSURE: 142 MMHG

## 2024-10-30 DIAGNOSIS — R11.2 CHEMOTHERAPY-INDUCED NAUSEA AND VOMITING: ICD-10-CM

## 2024-10-30 DIAGNOSIS — Z71.9 ENCOUNTER FOR EDUCATION: ICD-10-CM

## 2024-10-30 DIAGNOSIS — T45.1X5A CHEMOTHERAPY-INDUCED NAUSEA AND VOMITING: ICD-10-CM

## 2024-10-30 DIAGNOSIS — C16.0 GE JUNCTION CARCINOMA: ICD-10-CM

## 2024-10-30 DIAGNOSIS — C15.9 ESOPHAGEAL ADENOCARCINOMA: Primary | ICD-10-CM

## 2024-10-30 LAB
ALBUMIN SERPL BCP-MCNC: 3.4 G/DL (ref 3.4–5)
ALBUMIN/GLOBULIN RATIO: 1.03 RATIO (ref 1.1–1.8)
ALP SERPL-CCNC: 78 U/L (ref 46–116)
ALT SERPL W P-5'-P-CCNC: 23 U/L (ref 12–78)
ANION GAP SERPL CALC-SCNC: 7 MMOL/L (ref 3–11)
AST SERPL-CCNC: 19 U/L (ref 15–37)
BASOPHILS NFR BLD: 0.6 % (ref 0–3)
BILIRUB SERPL-MCNC: 0.4 MG/DL (ref 0–1)
BUN SERPL-MCNC: 18 MG/DL (ref 7–18)
BUN/CREAT SERPL: 17.47 RATIO (ref 7–18)
CALCIUM SERPL-MCNC: 8.6 MG/DL (ref 8.8–10.5)
CHLORIDE SERPL-SCNC: 101 MMOL/L (ref 100–108)
CO2 SERPL-SCNC: 28 MMOL/L (ref 21–32)
CREAT SERPL-MCNC: 1.03 MG/DL (ref 0.7–1.3)
EOSINOPHIL NFR BLD: 3.7 % (ref 1–3)
ERYTHROCYTE [DISTWIDTH] IN BLOOD BY AUTOMATED COUNT: 13 % (ref 12.5–18)
GFR ESTIMATION: 73
GLOBULIN: 3.3 G/DL (ref 2.3–3.5)
GLUCOSE SERPL-MCNC: 115 MG/DL (ref 70–110)
HCT VFR BLD AUTO: 39.3 % (ref 42–52)
HGB BLD-MCNC: 13.2 G/DL (ref 14–18)
LYMPHOCYTES NFR BLD: 18.2 % (ref 25–40)
MCH RBC QN AUTO: 30.6 PG (ref 27–31.2)
MCHC RBC AUTO-ENTMCNC: 33.6 G/DL (ref 31.8–35.4)
MCV RBC AUTO: 91.2 FL (ref 80–97)
MONOCYTES NFR BLD: 8.4 % (ref 1–15)
NEUTROPHILS # BLD AUTO: 5.94 10*3/UL (ref 1.8–7.7)
NEUTROPHILS NFR BLD: 68.6 % (ref 37–80)
NUCLEATED RED BLOOD CELLS: 0 %
PLATELETS: 187 10*3/UL (ref 142–424)
POTASSIUM SERPL-SCNC: 3.9 MMOL/L (ref 3.6–5.2)
PROT SERPL-MCNC: 6.7 G/DL (ref 6.4–8.2)
RBC # BLD AUTO: 4.31 10*6/UL (ref 4.7–6.1)
SODIUM BLD-SCNC: 136 MMOL/L (ref 135–145)
WBC # BLD: 8.7 10*3/UL (ref 4.6–10.2)

## 2024-10-30 PROCEDURE — 99215 OFFICE O/P EST HI 40 MIN: CPT | Mod: S$PBB,,, | Performed by: NURSE PRACTITIONER

## 2024-10-30 PROCEDURE — G2211 COMPLEX E/M VISIT ADD ON: HCPCS | Mod: S$PBB,,, | Performed by: NURSE PRACTITIONER

## 2024-10-30 RX ORDER — ONDANSETRON HYDROCHLORIDE 8 MG/1
8 TABLET, FILM COATED ORAL EVERY 8 HOURS PRN
Qty: 60 TABLET | Refills: 6 | Status: SHIPPED | OUTPATIENT
Start: 2024-10-30 | End: 2025-10-30

## 2024-11-01 ENCOUNTER — DOCUMENTATION ONLY (OUTPATIENT)
Dept: HEMATOLOGY/ONCOLOGY | Facility: CLINIC | Age: 79
End: 2024-11-01
Payer: OTHER GOVERNMENT

## 2024-11-11 ENCOUNTER — TELEPHONE (OUTPATIENT)
Dept: HEMATOLOGY/ONCOLOGY | Facility: CLINIC | Age: 79
End: 2024-11-11
Payer: OTHER GOVERNMENT

## 2024-11-11 NOTE — TELEPHONE ENCOUNTER
----- Message from Faiza sent at 11/11/2024 10:42 AM CST -----  Contact: self  Type:  Patient Returning Call    Who Called:Marcelino Al  Who Left Message for Patient:unsure  Does the patient know what this is regarding?:pt wants to know when will his chemo treatment start  Would the patient rather a call back or a response via [facility]?   Best Call Back Number:[phone]  Additional Information: n/a

## 2024-11-15 ENCOUNTER — TELEPHONE (OUTPATIENT)
Dept: HEMATOLOGY/ONCOLOGY | Facility: CLINIC | Age: 79
End: 2024-11-15
Payer: OTHER GOVERNMENT

## 2024-11-15 DIAGNOSIS — C15.9 ESOPHAGEAL ADENOCARCINOMA: Primary | ICD-10-CM

## 2024-11-15 RX ORDER — AZITHROMYCIN 250 MG/1
TABLET, FILM COATED ORAL
Qty: 6 TABLET | Refills: 0 | Status: SHIPPED | OUTPATIENT
Start: 2024-11-15 | End: 2024-11-20

## 2024-11-15 NOTE — TELEPHONE ENCOUNTER
Spoke to patient and advised he can take zpack and his symptoms need to be treated.  Advised that if still symptomatic or ill on Monday for his appointment that his tx may be held until he is well. [facility] [place].  Patient expressed understanding.    ANNY 11/15/24 @9:47am    ----- Message from Sandhya sent at 11/15/2024  8:06 AM CST -----  Type:  Needs Medical Advice    Who Called: pt  Symptoms (please be specific): cold/runny nose   How long has patient had these symptoms:  na  Pharmacy name and phone #:  na  Would the patient rather a call back or a response via [facility]? call  Best Call Back Number: [phone]    Additional Information: starting chemo and is unsure if he can take a zpack as he has a cold

## 2024-11-18 ENCOUNTER — OFFICE VISIT (OUTPATIENT)
Dept: HEMATOLOGY/ONCOLOGY | Facility: CLINIC | Age: 79
End: 2024-11-18
Payer: OTHER GOVERNMENT

## 2024-11-18 VITALS
OXYGEN SATURATION: 96 % | HEIGHT: 72 IN | TEMPERATURE: 98 F | BODY MASS INDEX: 24.11 KG/M2 | SYSTOLIC BLOOD PRESSURE: 125 MMHG | DIASTOLIC BLOOD PRESSURE: 72 MMHG | WEIGHT: 178 LBS | HEART RATE: 95 BPM | RESPIRATION RATE: 16 BRPM

## 2024-11-18 DIAGNOSIS — C15.9 ESOPHAGEAL ADENOCARCINOMA: Primary | ICD-10-CM

## 2024-11-18 DIAGNOSIS — C16.0 GE JUNCTION CARCINOMA: ICD-10-CM

## 2024-11-18 DIAGNOSIS — R11.2 CHEMOTHERAPY-INDUCED NAUSEA AND VOMITING: ICD-10-CM

## 2024-11-18 DIAGNOSIS — T45.1X5A CHEMOTHERAPY-INDUCED NAUSEA AND VOMITING: ICD-10-CM

## 2024-11-18 LAB
ALBUMIN SERPL BCP-MCNC: 3.7 G/DL (ref 3.4–5)
ALBUMIN/GLOBULIN RATIO: 1.09 RATIO (ref 1.1–1.8)
ALP SERPL-CCNC: 76 U/L (ref 46–116)
ALT SERPL W P-5'-P-CCNC: 24 U/L (ref 12–78)
ANION GAP SERPL CALC-SCNC: 7 MMOL/L (ref 3–11)
AST SERPL-CCNC: 20 U/L (ref 15–37)
BANDS: 5 % (ref 0–5)
BILIRUB SERPL-MCNC: 0.6 MG/DL (ref 0–1)
BUN SERPL-MCNC: 11 MG/DL (ref 7–18)
BUN/CREAT SERPL: 12.35 RATIO (ref 7–18)
CALCIUM SERPL-MCNC: 8.9 MG/DL (ref 8.8–10.5)
CELLS COUNTED: 100
CHLORIDE SERPL-SCNC: 101 MMOL/L (ref 100–108)
CO2 SERPL-SCNC: 30 MMOL/L (ref 21–32)
CREAT SERPL-MCNC: 0.89 MG/DL (ref 0.7–1.3)
EOSINOPHIL NFR BLD: 2 % (ref 1–3)
ERYTHROCYTE [DISTWIDTH] IN BLOOD BY AUTOMATED COUNT: 13.2 % (ref 12.5–18)
GFR ESTIMATION: 86
GLOBULIN: 3.4 G/DL (ref 2.3–3.5)
GLUCOSE SERPL-MCNC: 88 MG/DL (ref 70–110)
HCT VFR BLD AUTO: 43.5 % (ref 42–52)
HGB BLD-MCNC: 14.2 G/DL (ref 14–18)
LYMPHOCYTES NFR BLD: 21 % (ref 25–40)
MCH RBC QN AUTO: 30 PG (ref 27–31.2)
MCHC RBC AUTO-ENTMCNC: 32.6 G/DL (ref 31.8–35.4)
MCV RBC AUTO: 92 FL (ref 80–97)
MONOCYTES NFR BLD: 18 % (ref 1–15)
NEUTROPHILS # BLD AUTO: 3.7 10*3/UL (ref 1.8–7.7)
NEUTROPHILS NFR BLD: 54 % (ref 37–80)
NUCLEATED RED BLOOD CELLS: 0 %
PLATELETS: 196 10*3/UL (ref 142–424)
POTASSIUM SERPL-SCNC: 4.7 MMOL/L (ref 3.6–5.2)
PROT SERPL-MCNC: 7.1 G/DL (ref 6.4–8.2)
RBC # BLD AUTO: 4.73 10*6/UL (ref 4.7–6.1)
RBC MORPH BLD: ABNORMAL
SMALL PLATELETS BLD QL SMEAR: ADEQUATE
SODIUM BLD-SCNC: 138 MMOL/L (ref 135–145)
WBC # BLD: 6.3 10*3/UL (ref 4.6–10.2)

## 2024-11-18 PROCEDURE — G2211 COMPLEX E/M VISIT ADD ON: HCPCS | Mod: S$PBB,,, | Performed by: NURSE PRACTITIONER

## 2024-11-18 PROCEDURE — 99215 OFFICE O/P EST HI 40 MIN: CPT | Mod: S$PBB,,, | Performed by: NURSE PRACTITIONER

## 2024-11-18 RX ORDER — HEPARIN 100 UNIT/ML
500 SYRINGE INTRAVENOUS
Status: CANCELLED | OUTPATIENT
Start: 2024-11-18

## 2024-11-18 RX ORDER — DIPHENHYDRAMINE HYDROCHLORIDE 50 MG/ML
50 INJECTION INTRAMUSCULAR; INTRAVENOUS ONCE AS NEEDED
Status: CANCELLED | OUTPATIENT
Start: 2024-11-18

## 2024-11-18 RX ORDER — FAMOTIDINE 10 MG/ML
20 INJECTION INTRAVENOUS
Status: CANCELLED | OUTPATIENT
Start: 2024-11-18 | End: 2024-11-18

## 2024-11-18 RX ORDER — SODIUM CHLORIDE 0.9 % (FLUSH) 0.9 %
10 SYRINGE (ML) INJECTION
Status: CANCELLED | OUTPATIENT
Start: 2024-11-18

## 2024-11-18 RX ORDER — LIDOCAINE AND PRILOCAINE 25; 25 MG/G; MG/G
CREAM TOPICAL
Qty: 30 G | Refills: 2 | Status: SHIPPED | OUTPATIENT
Start: 2024-11-18

## 2024-11-18 RX ORDER — EPINEPHRINE 0.3 MG/.3ML
0.3 INJECTION SUBCUTANEOUS ONCE AS NEEDED
Status: CANCELLED | OUTPATIENT
Start: 2024-11-18

## 2024-11-18 NOTE — PROGRESS NOTES
MEDICAL ONCOLOGY FOLLOW UP CONSULTATION NOTE    Patient ID: Marcelino Al is a 79 y.o. male.    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  Patient presents to our clinic today for further evaluation      Pathology:    A.  Gastric antrum biopsy:  Chronic gastritis    B.  GE junction biopsy x3:  Moderately differentiated adenocarcinoma in a background of Addison's esophagus, see synoptic.      Synoptic report:  Procedure.  Biopsy  Tumor site: GE junction  Histologic type: Adenocarcinoma  Histologic grade: G2 of G3: Moderately differentiated  Tumor extent: Invades lamina propria: At least  Lymphatic and or vascular invasion: Not identified  Perineural invasion: Not identified  Additional findings: Addison's esophagus    C.  Esophageal mass at 30 cm:  Moderately differentiated adenocarcinoma in a background of Addison's esophagus, see synoptic.    Synoptic report:     Histologic type: Adenocarcinoma  Histologic grade: G2 of G3: Moderately differentiated  Tumor extent: Suspicious for invasion into submucosa  Lymphatic and vascular invasion: Not identified  Perineural invasion: Not identified        Imaging:   10/12/2024 CT PET  Impression:     1. Hypermetabolic mass of the distal esophagus consistent with patient's known malignancy with a metastatic paraesophageal superior mediastinal lymph node.  2. Radiotracer uptake in nondisplaced fractures of the right 8th, 9th, and 10th ribs.  There is no underlying osseous metastasis    Past Medical History:   Diagnosis Date    BPH with urinary obstruction     CAD (coronary artery disease)     Elevated cholesterol     Elevated PSA     Hypertension     Sleep apnea     CPAP     Family History   Problem Relation Name Age of  Onset    Heart disease Father      Cancer Sister      Heart disease Brother      Heart disease Brother       Social History     Socioeconomic History    Marital status:    Tobacco Use    Smoking status: Former     Types: Cigarettes     Passive exposure: Past    Smokeless tobacco: Never   Substance and Sexual Activity    Alcohol use: Not Currently     Comment: socially    Drug use: Never    Sexual activity: Yes     Partners: Female     Past Surgical History:   Procedure Laterality Date    CAROTID STENT      EYE SURGERY      HEMORRHOID SURGERY      INSERTION OF TUNNELED CENTRAL VENOUS CATHETER (CVC) WITH SUBCUTANEOUS PORT Right 10/21/2024    Procedure: INSERTION, PORT-A-CATH (Do 1st Pt. has Oncologist appt. after);  Surgeon: Moy Ruvalcaba MD;  Location: [facility];  Service: General;  Laterality: Right;  Right internal jugular    KNEE SURGERY      right knee replaced     PROSTATE BIOPSY           Review of systems:  Review of Systems   Constitutional:  Negative for activity change, appetite change, chills, diaphoresis, fatigue and unexpected weight change.   HENT:  Negative for congestion, facial swelling, hearing loss, mouth sores, trouble swallowing and voice change.    Eyes:  Negative for photophobia, pain, discharge and itching.   Respiratory:  Negative for apnea, cough, choking, chest tightness and shortness of breath.    Cardiovascular:  Negative for chest pain, palpitations and leg swelling.   Gastrointestinal:  Negative for abdominal distention, abdominal pain, anal bleeding and blood in stool.   Endocrine: Negative for cold intolerance, heat intolerance, polydipsia and polyphagia.   Genitourinary:  Negative for difficulty urinating, dysuria, flank pain and hematuria.   Musculoskeletal:  Negative for arthralgias, back pain, joint swelling, myalgias, neck pain and neck stiffness.   Skin:  Negative for color change, pallor and wound.   Allergic/Immunologic: Negative for environmental allergies, food  allergies and immunocompromised state.   Neurological:  Negative for dizziness, seizures, facial asymmetry, speech difficulty, light-headedness, numbness and headaches.   Hematological:  Negative for adenopathy. Does not bruise/bleed easily.   Psychiatric/Behavioral:  Negative for agitation, behavioral problems, confusion, decreased concentration and sleep disturbance.            Physical Exam  Vitals and nursing note reviewed.   Constitutional:       General: He is not in acute distress.     Appearance: Normal appearance. He is not ill-appearing.   HENT:      Head: Normocephalic and atraumatic.      Nose: No congestion or rhinorrhea.   Eyes:      General: No scleral icterus.     Extraocular Movements: Extraocular movements intact.      Pupils: Pupils are equal, round, and reactive to light.   Cardiovascular:      Rate and Rhythm: Normal rate and regular rhythm.      Pulses: Normal pulses.      Heart sounds: Normal heart sounds. No murmur heard.     No gallop.   Pulmonary:      Effort: Pulmonary effort is normal. No respiratory distress.      Breath sounds: Normal breath sounds. No stridor. No wheezing or rhonchi.   Abdominal:      General: Bowel sounds are normal. There is no distension.      Palpations: There is no mass.      Tenderness: There is no abdominal tenderness. There is no guarding.   Musculoskeletal:         General: No swelling, tenderness, deformity or signs of injury. Normal range of motion.      Cervical back: Normal range of motion and neck supple. No rigidity. No muscular tenderness.      Right lower leg: No edema.      Left lower leg: No edema.   Skin:     General: Skin is warm.      Coloration: Skin is not jaundiced or pale.      Findings: No bruising or lesion.   Neurological:      General: No focal deficit present.      Mental Status: He is alert and oriented to person, place, and time.      Cranial Nerves: No cranial nerve deficit.      Sensory: No sensory deficit.      Motor: No weakness.       Gait: Gait normal.   Psychiatric:         Mood and Affect: Mood normal.         Behavior: Behavior normal.         Thought Content: Thought content normal.       Vitals:    11/18/24 1028   BP: 125/72   Pulse: 95   Resp: 16   Temp: 98.3 °F (36.8 °C)     Body surface area is 2.02 meters squared.    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  I will obtain PET scan to complete staging and to rule out distant metastatic disease.  If no evidence of distant metastatic disease then plan would be for chemo XRT approach likely considering his age.  If localized disease then patient would need EUS staging for completion.  Will also make referral to Radiation Oncology.  == 10/22/24: EUS staging endoscopy scheduled this week by Dr Ruvalcaba. TMB discussion with plan for concurrent chemo-XRT. PET scan for restaging showed no evidence of distant metastatic disease. Referral to Rad-onc was already placed previously.  ==10/30/2024:  Patient here today to discuss upcoming chemotherapy/immunotherapy. An extensive discussion was had which included a thorough discussion of potential side effect profile, risk versus benefits, and expected outcomes.  Risks, including but not limited to, possible hair loss, bone marrow damage, damage to body organs, allergic reactions, sterility, nausea/vomiting, constipation/diarrhea, sores in the mouth, secondary cancers, and rarely death were all discuss.  Specific side effects pertaining to their chemotherapy/immunotherapy medications were discussed as well.  The patient agrees with the plan and all questions and their support systems questions have been answered to their satisfaction.  Premedications were prescribed and patient was educated on appropriate usage.  Patient was educated on when to call, and given the number to call, or to notify the provider including but not limited to:   Persistent nausea and vomiting, dehydration, fever greater than 100.4 lasting over 1 hour induration or isolated feeders greater than 101, rash while on active chemotherapy or immunotherapy, severe pain or new onset pain not controlled by current pain medication regimen. Patient is scheduled for initial visit with Dr. Bernal for XRT evaluation on 11/1/2024.  Will require evaluation, treatment planning/simulation so would not be able to start concurrent chemo/xrt next week.  Will likely be second week of November.  ==11/18/2024: Discussed EUS with Dr. Bernal who discussed with Dr. Chavez, will not change treatment plan thus was deferred.  Pt started XRT this am, labs reviewed and he is cleared to start carboplatin/taxol weekly concurrent with xrt    Plan:  Start carbo/taxol weekly concurrent with xrt  Cbc cmp weekly  Rtc 1 week       Total time spent in counseling and discussion about further management options including relevant lab work, treatment,  prognosis, medications and intended side effects was more than 40 minutes. More than 50% of the time was spent on counseling and coordination of care.  I spent a total of 40 minutes on the day of the visit.This includes face to face time and non-face to face time preparing to see the patient (eg, review of tests), Obtaining and/or reviewing separately obtained history, Documenting clinical information in the electronic or other health record, Independently interpreting resultsand communicating results to the patient/family/caregiver, or Care coordination.

## 2024-11-21 ENCOUNTER — TELEPHONE (OUTPATIENT)
Dept: HEMATOLOGY/ONCOLOGY | Facility: CLINIC | Age: 79
End: 2024-11-21
Payer: OTHER GOVERNMENT

## 2024-11-21 DIAGNOSIS — C15.9 ESOPHAGEAL ADENOCARCINOMA: Primary | ICD-10-CM

## 2024-11-22 LAB
ABS NRBC COUNT: 0 THOU/UL (ref 0–0.01)
ABSOLUTE BASOPHIL: 0 10*3/UL (ref 0–0.3)
ABSOLUTE EOSINOPHIL: 0.3 10*3/UL (ref 0–0.6)
ABSOLUTE IMMATURE GRAN: 0.02 THOU/UL (ref 0–0.03)
ABSOLUTE LYMPHOCYTE: 0.9 10*3/UL (ref 1.2–4)
ABSOLUTE MONOCYTE: 0.3 10*3/UL (ref 0.1–0.8)
ALBUMIN SERPL BCP-MCNC: 3.9 G/DL (ref 3.4–5)
ALBUMIN/GLOBULIN RATIO: 1.1 RATIO (ref 1.1–1.8)
ALP SERPL-CCNC: 77 U/L (ref 45–117)
ALT SERPL W P-5'-P-CCNC: 21 U/L (ref 16–61)
ANION GAP SERPL CALC-SCNC: 7 MMOL/L (ref 3–11)
AST SERPL-CCNC: 14 U/L (ref 15–37)
BASOPHILS NFR BLD: 0.7 % (ref 0–3)
BILIRUB SERPL-MCNC: 1.2 MG/DL (ref 0.2–1)
BUN SERPL-MCNC: 18 MG/DL (ref 7–18)
BUN/CREAT SERPL: 18.55 RATIO
CALCIUM SERPL-MCNC: 9.3 MG/DL (ref 8.5–10.1)
CHLORIDE SERPL-SCNC: 104 MMOL/L (ref 98–107)
CO2 SERPL-SCNC: 27 MMOL/L (ref 21–32)
CREAT SERPL-MCNC: 0.97 MG/DL (ref 0.7–1.3)
EOSINOPHIL NFR BLD: 4.3 % (ref 0–6)
ERYTHROCYTE [DISTWIDTH] IN BLOOD BY AUTOMATED COUNT: 12.6 % (ref 0–15.5)
GFR ESTIMATION: 78
GLOBULIN: 3.5 G/DL (ref 2.3–3.5)
GLUCOSE SERPL-MCNC: 131 MG/DL (ref 74–106)
HCT VFR BLD AUTO: 44.9 % (ref 42–52)
HGB BLD-MCNC: 14.6 G/DL (ref 14–18)
IMMATURE GRANULOCYTES: 0.3 % (ref 0–0.43)
LYMPHOCYTES NFR BLD: 14.5 % (ref 20–45)
MCH RBC QN AUTO: 30.5 PG (ref 27–32)
MCHC RBC AUTO-ENTMCNC: 32.5 % (ref 32–36)
MCV RBC AUTO: 93.7 FL (ref 80–97)
MONOCYTES NFR BLD: 5.3 % (ref 2–10)
NEUTROPHILS # BLD AUTO: 4.5 10*3/UL (ref 1.4–7)
NEUTROPHILS NFR BLD: 74.9 % (ref 50–80)
NUCLEATED RED BLOOD CELLS: 0 % (ref 0–0.2)
PLATELETS: 193 10*3/UL (ref 130–400)
PMV BLD AUTO: 10.7 FL (ref 9.2–12.2)
POTASSIUM SERPL-SCNC: 4.4 MMOL/L (ref 3.5–5.1)
PROT SERPL-MCNC: 7.4 G/DL (ref 6.4–8.2)
RBC # BLD AUTO: 4.79 10*6/UL (ref 4.7–6.1)
SODIUM BLD-SCNC: 138 MMOL/L (ref 131–143)
WBC # BLD: 6 10*3/UL (ref 4.5–10)

## 2024-11-25 ENCOUNTER — OFFICE VISIT (OUTPATIENT)
Dept: HEMATOLOGY/ONCOLOGY | Facility: CLINIC | Age: 79
End: 2024-11-25
Payer: OTHER GOVERNMENT

## 2024-11-25 VITALS
OXYGEN SATURATION: 94 % | DIASTOLIC BLOOD PRESSURE: 67 MMHG | HEIGHT: 72 IN | RESPIRATION RATE: 16 BRPM | BODY MASS INDEX: 24.41 KG/M2 | WEIGHT: 180.19 LBS | TEMPERATURE: 98 F | HEART RATE: 81 BPM | SYSTOLIC BLOOD PRESSURE: 124 MMHG

## 2024-11-25 DIAGNOSIS — C16.0 GE JUNCTION CARCINOMA: Primary | ICD-10-CM

## 2024-11-25 PROCEDURE — 99214 OFFICE O/P EST MOD 30 MIN: CPT | Mod: S$PBB,,, | Performed by: NURSE PRACTITIONER

## 2024-11-25 RX ORDER — HEPARIN 100 UNIT/ML
500 SYRINGE INTRAVENOUS
Status: CANCELLED | OUTPATIENT
Start: 2024-11-25

## 2024-11-25 RX ORDER — DIPHENHYDRAMINE HYDROCHLORIDE 50 MG/ML
50 INJECTION INTRAMUSCULAR; INTRAVENOUS ONCE AS NEEDED
Status: CANCELLED | OUTPATIENT
Start: 2024-11-25

## 2024-11-25 RX ORDER — FAMOTIDINE 10 MG/ML
20 INJECTION INTRAVENOUS
Status: CANCELLED | OUTPATIENT
Start: 2024-11-25 | End: 2024-11-25

## 2024-11-25 RX ORDER — EPINEPHRINE 0.3 MG/.3ML
0.3 INJECTION SUBCUTANEOUS ONCE AS NEEDED
Status: CANCELLED | OUTPATIENT
Start: 2024-11-25

## 2024-11-25 RX ORDER — SODIUM CHLORIDE 0.9 % (FLUSH) 0.9 %
10 SYRINGE (ML) INJECTION
Status: CANCELLED | OUTPATIENT
Start: 2024-11-25

## 2024-11-25 NOTE — PROGRESS NOTES
MEDICAL ONCOLOGY FOLLOW UP CONSULTATION NOTE    Patient ID: Marcelino Al is a 79 y.o. male.    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  Patient presents to our clinic today for further evaluation      Pathology:    A.  Gastric antrum biopsy:  Chronic gastritis    B.  GE junction biopsy x3:  Moderately differentiated adenocarcinoma in a background of Addison's esophagus, see synoptic.      Synoptic report:  Procedure.  Biopsy  Tumor site: GE junction  Histologic type: Adenocarcinoma  Histologic grade: G2 of G3: Moderately differentiated  Tumor extent: Invades lamina propria: At least  Lymphatic and or vascular invasion: Not identified  Perineural invasion: Not identified  Additional findings: Addison's esophagus    C.  Esophageal mass at 30 cm:  Moderately differentiated adenocarcinoma in a background of Addison's esophagus, see synoptic.    Synoptic report:     Histologic type: Adenocarcinoma  Histologic grade: G2 of G3: Moderately differentiated  Tumor extent: Suspicious for invasion into submucosa  Lymphatic and vascular invasion: Not identified  Perineural invasion: Not identified        Imaging:   10/12/2024 CT PET  Impression:     1. Hypermetabolic mass of the distal esophagus consistent with patient's known malignancy with a metastatic paraesophageal superior mediastinal lymph node.  2. Radiotracer uptake in nondisplaced fractures of the right 8th, 9th, and 10th ribs.  There is no underlying osseous metastasis    Past Medical History:   Diagnosis Date    BPH with urinary obstruction     CAD (coronary artery disease)     Elevated cholesterol     Elevated PSA     Hypertension     Sleep apnea     CPAP     Family History   Problem Relation Name Age of  Onset    Heart disease Father      Cancer Sister      Heart disease Brother      Heart disease Brother       Social History     Socioeconomic History    Marital status:    Tobacco Use    Smoking status: Former     Types: Cigarettes     Passive exposure: Past    Smokeless tobacco: Never   Substance and Sexual Activity    Alcohol use: Not Currently     Comment: socially    Drug use: Never    Sexual activity: Yes     Partners: Female     Past Surgical History:   Procedure Laterality Date    CAROTID STENT      EYE SURGERY      HEMORRHOID SURGERY      INSERTION OF TUNNELED CENTRAL VENOUS CATHETER (CVC) WITH SUBCUTANEOUS PORT Right 10/21/2024    Procedure: INSERTION, PORT-A-CATH (Do 1st Pt. has Oncologist appt. after);  Surgeon: Moy Ruvalcaba MD;  Location: [facility];  Service: General;  Laterality: Right;  Right internal jugular    KNEE SURGERY      right knee replaced     PROSTATE BIOPSY           Review of systems:  Review of Systems   Constitutional:  Negative for activity change, appetite change, chills, diaphoresis, fatigue and unexpected weight change.   HENT:  Negative for congestion, facial swelling, hearing loss, mouth sores, trouble swallowing and voice change.    Eyes:  Negative for photophobia, pain, discharge and itching.   Respiratory:  Negative for apnea, cough, choking, chest tightness and shortness of breath.    Cardiovascular:  Negative for chest pain, palpitations and leg swelling.   Gastrointestinal:  Negative for abdominal distention, abdominal pain, anal bleeding and blood in stool.   Endocrine: Negative for cold intolerance, heat intolerance, polydipsia and polyphagia.   Genitourinary:  Negative for difficulty urinating, dysuria, flank pain and hematuria.   Musculoskeletal:  Negative for arthralgias, back pain, joint swelling, myalgias, neck pain and neck stiffness.   Skin:  Negative for color change, pallor and wound.   Allergic/Immunologic: Negative for environmental allergies, food  allergies and immunocompromised state.   Neurological:  Negative for dizziness, seizures, facial asymmetry, speech difficulty, light-headedness, numbness and headaches.   Hematological:  Negative for adenopathy. Does not bruise/bleed easily.   Psychiatric/Behavioral:  Negative for agitation, behavioral problems, confusion, decreased concentration and sleep disturbance.            Physical Exam  Vitals and nursing note reviewed.   Constitutional:       General: He is not in acute distress.     Appearance: Normal appearance. He is not ill-appearing.   HENT:      Head: Normocephalic and atraumatic.      Nose: No congestion or rhinorrhea.   Eyes:      General: No scleral icterus.     Extraocular Movements: Extraocular movements intact.      Pupils: Pupils are equal, round, and reactive to light.   Cardiovascular:      Rate and Rhythm: Normal rate and regular rhythm.      Pulses: Normal pulses.      Heart sounds: Normal heart sounds. No murmur heard.     No gallop.   Pulmonary:      Effort: Pulmonary effort is normal. No respiratory distress.      Breath sounds: Normal breath sounds. No stridor. No wheezing or rhonchi.   Abdominal:      General: Bowel sounds are normal. There is no distension.      Palpations: There is no mass.      Tenderness: There is no abdominal tenderness. There is no guarding.   Musculoskeletal:         General: No swelling, tenderness, deformity or signs of injury. Normal range of motion.      Cervical back: Normal range of motion and neck supple. No rigidity. No muscular tenderness.      Right lower leg: No edema.      Left lower leg: No edema.   Skin:     General: Skin is warm.      Coloration: Skin is not jaundiced or pale.      Findings: No bruising or lesion.   Neurological:      General: No focal deficit present.      Mental Status: He is alert and oriented to person, place, and time.      Cranial Nerves: No cranial nerve deficit.      Sensory: No sensory deficit.      Motor: No weakness.       Gait: Gait normal.   Psychiatric:         Mood and Affect: Mood normal.         Behavior: Behavior normal.         Thought Content: Thought content normal.       Vitals:    11/25/24 0951   BP: 124/67   Pulse: 81   Resp: 16   Temp: 97.9 °F (36.6 °C)     Body surface area is 2.04 meters squared.    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  I will obtain PET scan to complete staging and to rule out distant metastatic disease.  If no evidence of distant metastatic disease then plan would be for chemo XRT approach likely considering his age.  If localized disease then patient would need EUS staging for completion.  Will also make referral to Radiation Oncology.  == 10/22/24: EUS staging endoscopy scheduled this week by Dr Ruvalcaba. TMB discussion with plan for concurrent chemo-XRT. PET scan for restaging showed no evidence of distant metastatic disease. Referral to Rad-onc was already placed previously.  ==10/30/2024:  Patient here today to discuss upcoming chemotherapy/immunotherapy. An extensive discussion was had which included a thorough discussion of potential side effect profile, risk versus benefits, and expected outcomes.  Risks, including but not limited to, possible hair loss, bone marrow damage, damage to body organs, allergic reactions, sterility, nausea/vomiting, constipation/diarrhea, sores in the mouth, secondary cancers, and rarely death were all discuss.  Specific side effects pertaining to their chemotherapy/immunotherapy medications were discussed as well.  The patient agrees with the plan and all questions and their support systems questions have been answered to their satisfaction.  Premedications were prescribed and patient was educated on appropriate usage.  Patient was educated on when to call, and given the number to call, or to notify the provider including but not limited to:   Persistent nausea and vomiting, dehydration, fever greater than 100.4 lasting over 1 hour induration or isolated feeders greater than 101, rash while on active chemotherapy or immunotherapy, severe pain or new onset pain not controlled by current pain medication regimen. Patient is scheduled for initial visit with Dr. Bernal for XRT evaluation on 11/1/2024.  Will require evaluation, treatment planning/simulation so would not be able to start concurrent chemo/xrt next week.  Will likely be second week of November.  ==11/18/2024: Discussed EUS with Dr. Bernal who discussed with Dr. Chavez, will not change treatment plan thus was deferred.  Pt started XRT this am, labs reviewed and he is cleared to start carboplatin/taxol weekly concurrent with xrt  == 11/25/24: tolerating chem/xrt well with no side effects at this time.     Plan:  Continue carbo/taxol weekly concurrent with xrt  Cbc cmp weekly  Rtc 1 week       Total time spent in counseling and discussion about further management options including relevant lab work, treatment,  prognosis, medications and intended side effects was more than 40 minutes. More than 50% of the time was spent on counseling and coordination of care.  I spent a total of 40 minutes on the day of the visit.This includes face to face time and non-face to face time preparing to see the patient (eg, review of tests), Obtaining and/or reviewing separately obtained history, Documenting clinical information in the electronic or other health record, Independently interpreting resultsand communicating results to the patient/family/caregiver, or Care coordination.

## 2024-12-02 ENCOUNTER — OFFICE VISIT (OUTPATIENT)
Dept: HEMATOLOGY/ONCOLOGY | Facility: CLINIC | Age: 79
End: 2024-12-02
Payer: OTHER GOVERNMENT

## 2024-12-02 VITALS
DIASTOLIC BLOOD PRESSURE: 68 MMHG | RESPIRATION RATE: 16 BRPM | HEART RATE: 93 BPM | SYSTOLIC BLOOD PRESSURE: 125 MMHG | BODY MASS INDEX: 24.74 KG/M2 | OXYGEN SATURATION: 95 % | HEIGHT: 72 IN | TEMPERATURE: 99 F | WEIGHT: 182.63 LBS

## 2024-12-02 DIAGNOSIS — R11.2 CHEMOTHERAPY-INDUCED NAUSEA AND VOMITING: ICD-10-CM

## 2024-12-02 DIAGNOSIS — C15.9 ESOPHAGEAL ADENOCARCINOMA: ICD-10-CM

## 2024-12-02 DIAGNOSIS — C16.0 GE JUNCTION CARCINOMA: Primary | ICD-10-CM

## 2024-12-02 DIAGNOSIS — K59.00 CONSTIPATION, UNSPECIFIED CONSTIPATION TYPE: ICD-10-CM

## 2024-12-02 DIAGNOSIS — T45.1X5A CHEMOTHERAPY-INDUCED NAUSEA AND VOMITING: ICD-10-CM

## 2024-12-02 LAB
ABS NRBC COUNT: 0 THOU/UL (ref 0–0.01)
ABSOLUTE BASOPHIL: 0 10*3/UL (ref 0–0.3)
ABSOLUTE EOSINOPHIL: 0.1 10*3/UL (ref 0–0.6)
ABSOLUTE IMMATURE GRAN: 0.02 THOU/UL (ref 0–0.03)
ABSOLUTE LYMPHOCYTE: 0.4 10*3/UL (ref 1.2–4)
ABSOLUTE MONOCYTE: 0.3 10*3/UL (ref 0.1–0.8)
ALBUMIN SERPL BCP-MCNC: 3.7 G/DL (ref 3.4–5)
ALBUMIN/GLOBULIN RATIO: 1.1 RATIO (ref 1.1–1.8)
ALP SERPL-CCNC: 67 U/L (ref 45–117)
ALT SERPL W P-5'-P-CCNC: 21 U/L (ref 16–61)
ANION GAP SERPL CALC-SCNC: 9 MMOL/L (ref 3–11)
AST SERPL-CCNC: 17 U/L (ref 15–37)
BASOPHILS NFR BLD: 1.1 % (ref 0–3)
BILIRUB SERPL-MCNC: 0.6 MG/DL (ref 0.2–1)
BUN SERPL-MCNC: 16 MG/DL (ref 7–18)
BUN/CREAT SERPL: 19.51 RATIO
CALCIUM SERPL-MCNC: 9 MG/DL (ref 8.5–10.1)
CHLORIDE SERPL-SCNC: 105 MMOL/L (ref 98–107)
CO2 SERPL-SCNC: 27 MMOL/L (ref 21–32)
CREAT SERPL-MCNC: 0.82 MG/DL (ref 0.7–1.3)
EOSINOPHIL NFR BLD: 3.4 % (ref 0–6)
ERYTHROCYTE [DISTWIDTH] IN BLOOD BY AUTOMATED COUNT: 12.9 % (ref 0–15.5)
GFR ESTIMATION: 88
GLOBULIN: 3.4 G/DL (ref 2.3–3.5)
GLUCOSE SERPL-MCNC: 103 MG/DL (ref 74–106)
HCT VFR BLD AUTO: 41.3 % (ref 42–52)
HGB BLD-MCNC: 13.2 G/DL (ref 14–18)
IMMATURE GRANULOCYTES: 0.7 % (ref 0–0.43)
LYMPHOCYTES NFR BLD: 15.3 % (ref 20–45)
MCH RBC QN AUTO: 30.4 PG (ref 27–32)
MCHC RBC AUTO-ENTMCNC: 32 % (ref 32–36)
MCV RBC AUTO: 95.2 FL (ref 80–97)
MONOCYTES NFR BLD: 11.2 % (ref 2–10)
NEUTROPHILS # BLD AUTO: 1.8 10*3/UL (ref 1.4–7)
NEUTROPHILS NFR BLD: 68.3 % (ref 50–80)
NUCLEATED RED BLOOD CELLS: 0 % (ref 0–0.2)
PERIPHERAL SMEAR: NORMAL
PLATELETS: 156 10*3/UL (ref 130–400)
PMV BLD AUTO: 10.1 FL (ref 9.2–12.2)
POTASSIUM SERPL-SCNC: 4.3 MMOL/L (ref 3.5–5.1)
PROT SERPL-MCNC: 7.1 G/DL (ref 6.4–8.2)
RBC # BLD AUTO: 4.34 10*6/UL (ref 4.7–6.1)
SODIUM BLD-SCNC: 141 MMOL/L (ref 131–143)
WBC # BLD: 2.7 10*3/UL (ref 4.5–10)

## 2024-12-02 PROCEDURE — 99215 OFFICE O/P EST HI 40 MIN: CPT | Mod: S$PBB,,, | Performed by: NURSE PRACTITIONER

## 2024-12-02 RX ORDER — SODIUM CHLORIDE 0.9 % (FLUSH) 0.9 %
10 SYRINGE (ML) INJECTION
Status: CANCELLED | OUTPATIENT
Start: 2024-12-02

## 2024-12-02 RX ORDER — EPINEPHRINE 0.3 MG/.3ML
0.3 INJECTION SUBCUTANEOUS ONCE AS NEEDED
Status: CANCELLED | OUTPATIENT
Start: 2024-12-02

## 2024-12-02 RX ORDER — HEPARIN 100 UNIT/ML
500 SYRINGE INTRAVENOUS
Status: CANCELLED | OUTPATIENT
Start: 2024-12-02

## 2024-12-02 RX ORDER — FAMOTIDINE 10 MG/ML
20 INJECTION INTRAVENOUS
Status: CANCELLED | OUTPATIENT
Start: 2024-12-02 | End: 2024-12-02

## 2024-12-02 RX ORDER — DIPHENHYDRAMINE HYDROCHLORIDE 50 MG/ML
50 INJECTION INTRAMUSCULAR; INTRAVENOUS ONCE AS NEEDED
Status: CANCELLED | OUTPATIENT
Start: 2024-12-02

## 2024-12-02 NOTE — PROGRESS NOTES
MEDICAL ONCOLOGY FOLLOW UP CONSULTATION NOTE    Patient ID: Marcelino Al is a 79 y.o. male.    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  Patient presents to our clinic today for further evaluation      Pathology:    A.  Gastric antrum biopsy:  Chronic gastritis    B.  GE junction biopsy x3:  Moderately differentiated adenocarcinoma in a background of Addison's esophagus, see synoptic.      Synoptic report:  Procedure.  Biopsy  Tumor site: GE junction  Histologic type: Adenocarcinoma  Histologic grade: G2 of G3: Moderately differentiated  Tumor extent: Invades lamina propria: At least  Lymphatic and or vascular invasion: Not identified  Perineural invasion: Not identified  Additional findings: Addison's esophagus    C.  Esophageal mass at 30 cm:  Moderately differentiated adenocarcinoma in a background of Addison's esophagus, see synoptic.    Synoptic report:     Histologic type: Adenocarcinoma  Histologic grade: G2 of G3: Moderately differentiated  Tumor extent: Suspicious for invasion into submucosa  Lymphatic and vascular invasion: Not identified  Perineural invasion: Not identified        Imaging:   10/12/2024 CT PET  Impression:     1. Hypermetabolic mass of the distal esophagus consistent with patient's known malignancy with a metastatic paraesophageal superior mediastinal lymph node.  2. Radiotracer uptake in nondisplaced fractures of the right 8th, 9th, and 10th ribs.  There is no underlying osseous metastasis    Past Medical History:   Diagnosis Date    BPH with urinary obstruction     CAD (coronary artery disease)     Elevated cholesterol     Elevated PSA     Hypertension     Sleep apnea     CPAP     Family History   Problem Relation Name Age of  Onset    Heart disease Father      Cancer Sister      Heart disease Brother      Heart disease Brother       Social History     Socioeconomic History    Marital status:    Tobacco Use    Smoking status: Former     Types: Cigarettes     Passive exposure: Past    Smokeless tobacco: Never   Substance and Sexual Activity    Alcohol use: Not Currently     Comment: socially    Drug use: Never    Sexual activity: Yes     Partners: Female     Past Surgical History:   Procedure Laterality Date    CAROTID STENT      EYE SURGERY      HEMORRHOID SURGERY      INSERTION OF TUNNELED CENTRAL VENOUS CATHETER (CVC) WITH SUBCUTANEOUS PORT Right 10/21/2024    Procedure: INSERTION, PORT-A-CATH (Do 1st Pt. has Oncologist appt. after);  Surgeon: Moy Ruvalcaba MD;  Location: [facility];  Service: General;  Laterality: Right;  Right internal jugular    KNEE SURGERY      right knee replaced     PROSTATE BIOPSY           Review of systems:  Review of Systems   Constitutional:  Negative for activity change, appetite change, chills, diaphoresis, fatigue and unexpected weight change.   HENT:  Negative for congestion, facial swelling, hearing loss, mouth sores, trouble swallowing and voice change.    Eyes:  Negative for photophobia, pain, discharge and itching.   Respiratory:  Negative for apnea, cough, choking, chest tightness and shortness of breath.    Cardiovascular:  Negative for chest pain, palpitations and leg swelling.   Gastrointestinal:  Negative for abdominal distention, abdominal pain, anal bleeding and blood in stool.   Endocrine: Negative for cold intolerance, heat intolerance, polydipsia and polyphagia.   Genitourinary:  Negative for difficulty urinating, dysuria, flank pain and hematuria.   Musculoskeletal:  Negative for arthralgias, back pain, joint swelling, myalgias, neck pain and neck stiffness.   Skin:  Negative for color change, pallor and wound.   Allergic/Immunologic: Negative for environmental allergies, food  allergies and immunocompromised state.   Neurological:  Negative for dizziness, seizures, facial asymmetry, speech difficulty, light-headedness, numbness and headaches.   Hematological:  Negative for adenopathy. Does not bruise/bleed easily.   Psychiatric/Behavioral:  Negative for agitation, behavioral problems, confusion, decreased concentration and sleep disturbance.            Physical Exam  Vitals and nursing note reviewed.   Constitutional:       General: He is not in acute distress.     Appearance: Normal appearance. He is not ill-appearing.   HENT:      Head: Normocephalic and atraumatic.      Nose: No congestion or rhinorrhea.   Eyes:      General: No scleral icterus.     Extraocular Movements: Extraocular movements intact.      Pupils: Pupils are equal, round, and reactive to light.   Cardiovascular:      Rate and Rhythm: Normal rate and regular rhythm.      Pulses: Normal pulses.      Heart sounds: Normal heart sounds. No murmur heard.     No gallop.   Pulmonary:      Effort: Pulmonary effort is normal. No respiratory distress.      Breath sounds: Normal breath sounds. No stridor. No wheezing or rhonchi.   Abdominal:      General: Bowel sounds are normal. There is no distension.      Palpations: There is no mass.      Tenderness: There is no abdominal tenderness. There is no guarding.   Musculoskeletal:         General: No swelling, tenderness, deformity or signs of injury. Normal range of motion.      Cervical back: Normal range of motion and neck supple. No rigidity. No muscular tenderness.      Right lower leg: No edema.      Left lower leg: No edema.   Skin:     General: Skin is warm.      Coloration: Skin is not jaundiced or pale.      Findings: No bruising or lesion.   Neurological:      General: No focal deficit present.      Mental Status: He is alert and oriented to person, place, and time.      Cranial Nerves: No cranial nerve deficit.      Sensory: No sensory deficit.      Motor: No weakness.       Gait: Gait normal.   Psychiatric:         Mood and Affect: Mood normal.         Behavior: Behavior normal.         Thought Content: Thought content normal.     Vitals:    12/02/24 1011   BP: 125/68   Pulse: 93   Resp: 16   Temp: 98.6 °F (37 °C)     Body surface area is 2.05 meters squared.    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  I will obtain PET scan to complete staging and to rule out distant metastatic disease.  If no evidence of distant metastatic disease then plan would be for chemo XRT approach likely considering his age.  If localized disease then patient would need EUS staging for completion.  Will also make referral to Radiation Oncology.  == 10/22/24: EUS staging endoscopy scheduled this week by Dr Ruvalcaba. TMB discussion with plan for concurrent chemo-XRT. PET scan for restaging showed no evidence of distant metastatic disease. Referral to Rad-onc was already placed previously.  ==10/30/2024:  Patient here today to discuss upcoming chemotherapy/immunotherapy. An extensive discussion was had which included a thorough discussion of potential side effect profile, risk versus benefits, and expected outcomes.  Risks, including but not limited to, possible hair loss, bone marrow damage, damage to body organs, allergic reactions, sterility, nausea/vomiting, constipation/diarrhea, sores in the mouth, secondary cancers, and rarely death were all discuss.  Specific side effects pertaining to their chemotherapy/immunotherapy medications were discussed as well.  The patient agrees with the plan and all questions and their support systems questions have been answered to their satisfaction.  Premedications were prescribed and patient was educated on appropriate usage.  Patient was educated on when to call, and given the number to call, or to notify the provider including but not limited to:   Persistent nausea and vomiting, dehydration, fever greater than 100.4 lasting over 1 hour induration or isolated feeders greater than 101, rash while on active chemotherapy or immunotherapy, severe pain or new onset pain not controlled by current pain medication regimen. Patient is scheduled for initial visit with Dr. Bernal for XRT evaluation on 11/1/2024.  Will require evaluation, treatment planning/simulation so would not be able to start concurrent chemo/xrt next week.  Will likely be second week of November.  ==11/18/2024: Discussed EUS with Dr. Bernal who discussed with Dr. Chavez, will not change treatment plan thus was deferred.  Pt started XRT this am, labs reviewed and he is cleared to start carboplatin/taxol weekly concurrent with xrt  == 11/25/24: tolerating chem/xrt well with no side effects at this time.   ==12/2/2024: Pt has 15 more treatments of xrt, will finish on 12/23/24. Anc 1.7, tolerating chemotherapy and xrt well.  Having some mild constipation treating with prunes and mirilax, may also use senna prn. Labs reviewed, patient is cleared for chemotherapy. He reports very mild intermittent muscle cramps to bilateral legs at night will check magnesium with next labs due to carboplatin    Plan:  Continue carbo/taxol weekly concurrent with xrt  Cbc cmp weekly mg with next labs  Rtc 1 week       Total time spent in counseling and discussion about further management options including relevant lab work, treatment,  prognosis, medications and intended side effects was more than 40 minutes. More than 50% of the time was spent on counseling and coordination of care.  I spent a total of 40 minutes on the day of the visit.This includes face to face time and non-face to face time preparing to see the patient (eg, review of tests), Obtaining and/or reviewing separately obtained history, Documenting clinical information in the electronic or other health record, Independently interpreting resultsand  communicating results to the patient/family/caregiver, or Care coordination.

## 2024-12-06 LAB
ABS NRBC COUNT: 0 THOU/UL (ref 0–0.01)
ABSOLUTE BASOPHIL: 0 10*3/UL (ref 0–0.3)
ABSOLUTE EOSINOPHIL: 0.1 10*3/UL (ref 0–0.6)
ABSOLUTE IMMATURE GRAN: 0.01 THOU/UL (ref 0–0.03)
ABSOLUTE LYMPHOCYTE: 0.3 10*3/UL (ref 1.2–4)
ABSOLUTE MONOCYTE: 0.2 10*3/UL (ref 0.1–0.8)
ALBUMIN SERPL BCP-MCNC: 3.6 G/DL (ref 3.4–5)
ALBUMIN/GLOBULIN RATIO: 1.1 RATIO (ref 1.1–1.8)
ALP SERPL-CCNC: 65 U/L (ref 45–117)
ALT SERPL W P-5'-P-CCNC: 25 U/L (ref 16–61)
ANION GAP SERPL CALC-SCNC: 6 MMOL/L (ref 3–11)
AST SERPL-CCNC: 21 U/L (ref 15–37)
BASOPHILS NFR BLD: 1.1 % (ref 0–3)
BILIRUB SERPL-MCNC: 0.8 MG/DL (ref 0.2–1)
BUN SERPL-MCNC: 15 MG/DL (ref 7–18)
BUN/CREAT SERPL: 18.29 RATIO
CALCIUM SERPL-MCNC: 9.2 MG/DL (ref 8.5–10.1)
CHLORIDE SERPL-SCNC: 103 MMOL/L (ref 98–107)
CO2 SERPL-SCNC: 26 MMOL/L (ref 21–32)
CREAT SERPL-MCNC: 0.82 MG/DL (ref 0.7–1.3)
EOSINOPHIL NFR BLD: 1.9 % (ref 0–6)
ERYTHROCYTE [DISTWIDTH] IN BLOOD BY AUTOMATED COUNT: 12.7 % (ref 0–15.5)
GFR ESTIMATION: 88
GLOBULIN: 3.3 G/DL (ref 2.3–3.5)
GLUCOSE SERPL-MCNC: 111 MG/DL (ref 74–106)
HCT VFR BLD AUTO: 42.5 % (ref 42–52)
HGB BLD-MCNC: 13.3 G/DL (ref 14–18)
IMMATURE GRANULOCYTES: 0.4 % (ref 0–0.43)
LYMPHOCYTES NFR BLD: 11.4 % (ref 20–45)
MAGNESIUM SERPL-MCNC: 1.9 MG/DL (ref 1.6–2.6)
MCH RBC QN AUTO: 30 PG (ref 27–32)
MCHC RBC AUTO-ENTMCNC: 31.3 % (ref 32–36)
MCV RBC AUTO: 95.9 FL (ref 80–97)
MONOCYTES NFR BLD: 9.1 % (ref 2–10)
NEUTROPHILS # BLD AUTO: 2 10*3/UL (ref 1.4–7)
NEUTROPHILS NFR BLD: 76.1 % (ref 50–80)
NUCLEATED RED BLOOD CELLS: 0 % (ref 0–0.2)
PLATELETS: 162 10*3/UL (ref 130–400)
PMV BLD AUTO: 10 FL (ref 9.2–12.2)
POTASSIUM SERPL-SCNC: 4.4 MMOL/L (ref 3.5–5.1)
PROT SERPL-MCNC: 6.9 G/DL (ref 6.4–8.2)
RBC # BLD AUTO: 4.43 10*6/UL (ref 4.7–6.1)
SODIUM BLD-SCNC: 135 MMOL/L (ref 131–143)
WBC # BLD: 2.6 10*3/UL (ref 4.5–10)

## 2024-12-09 ENCOUNTER — OFFICE VISIT (OUTPATIENT)
Dept: HEMATOLOGY/ONCOLOGY | Facility: CLINIC | Age: 79
End: 2024-12-09
Payer: OTHER GOVERNMENT

## 2024-12-09 VITALS
BODY MASS INDEX: 23.79 KG/M2 | WEIGHT: 175.63 LBS | RESPIRATION RATE: 16 BRPM | OXYGEN SATURATION: 94 % | HEIGHT: 72 IN | DIASTOLIC BLOOD PRESSURE: 70 MMHG | TEMPERATURE: 98 F | SYSTOLIC BLOOD PRESSURE: 110 MMHG | HEART RATE: 101 BPM

## 2024-12-09 DIAGNOSIS — C16.0 GE JUNCTION CARCINOMA: Primary | ICD-10-CM

## 2024-12-09 DIAGNOSIS — T45.1X5A CHEMOTHERAPY-INDUCED NAUSEA AND VOMITING: ICD-10-CM

## 2024-12-09 DIAGNOSIS — C15.9 ESOPHAGEAL ADENOCARCINOMA: ICD-10-CM

## 2024-12-09 DIAGNOSIS — R11.2 CHEMOTHERAPY-INDUCED NAUSEA AND VOMITING: ICD-10-CM

## 2024-12-09 PROCEDURE — G2211 COMPLEX E/M VISIT ADD ON: HCPCS | Mod: S$PBB,,, | Performed by: NURSE PRACTITIONER

## 2024-12-09 PROCEDURE — 99215 OFFICE O/P EST HI 40 MIN: CPT | Mod: S$PBB,,, | Performed by: NURSE PRACTITIONER

## 2024-12-09 RX ORDER — DIPHENHYDRAMINE HYDROCHLORIDE 50 MG/ML
50 INJECTION INTRAMUSCULAR; INTRAVENOUS ONCE AS NEEDED
Status: CANCELLED | OUTPATIENT
Start: 2024-12-09

## 2024-12-09 RX ORDER — FAMOTIDINE 10 MG/ML
20 INJECTION INTRAVENOUS
Status: CANCELLED | OUTPATIENT
Start: 2024-12-09 | End: 2024-12-09

## 2024-12-09 RX ORDER — SODIUM CHLORIDE 0.9 % (FLUSH) 0.9 %
10 SYRINGE (ML) INJECTION
Status: CANCELLED | OUTPATIENT
Start: 2024-12-09

## 2024-12-09 RX ORDER — PROCHLORPERAZINE MALEATE 10 MG
10 TABLET ORAL EVERY 8 HOURS PRN
Qty: 30 TABLET | Refills: 1 | Status: SHIPPED | OUTPATIENT
Start: 2024-12-09 | End: 2025-12-09

## 2024-12-09 RX ORDER — EPINEPHRINE 0.3 MG/.3ML
0.3 INJECTION SUBCUTANEOUS ONCE AS NEEDED
Status: CANCELLED | OUTPATIENT
Start: 2024-12-09

## 2024-12-09 RX ORDER — HEPARIN 100 UNIT/ML
500 SYRINGE INTRAVENOUS
Status: CANCELLED | OUTPATIENT
Start: 2024-12-09

## 2024-12-09 NOTE — PROGRESS NOTES
MEDICAL ONCOLOGY FOLLOW UP CONSULTATION NOTE    Patient ID: Marcelino Al is a 79 y.o. male.    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  Patient presents to our clinic today for further evaluation      Pathology:    A.  Gastric antrum biopsy:  Chronic gastritis    B.  GE junction biopsy x3:  Moderately differentiated adenocarcinoma in a background of Addison's esophagus, see synoptic.      Synoptic report:  Procedure.  Biopsy  Tumor site: GE junction  Histologic type: Adenocarcinoma  Histologic grade: G2 of G3: Moderately differentiated  Tumor extent: Invades lamina propria: At least  Lymphatic and or vascular invasion: Not identified  Perineural invasion: Not identified  Additional findings: Addison's esophagus    C.  Esophageal mass at 30 cm:  Moderately differentiated adenocarcinoma in a background of Addison's esophagus, see synoptic.    Synoptic report:     Histologic type: Adenocarcinoma  Histologic grade: G2 of G3: Moderately differentiated  Tumor extent: Suspicious for invasion into submucosa  Lymphatic and vascular invasion: Not identified  Perineural invasion: Not identified        Imaging:   10/12/2024 CT PET  Impression:     1. Hypermetabolic mass of the distal esophagus consistent with patient's known malignancy with a metastatic paraesophageal superior mediastinal lymph node.  2. Radiotracer uptake in nondisplaced fractures of the right 8th, 9th, and 10th ribs.  There is no underlying osseous metastasis    Past Medical History:   Diagnosis Date    BPH with urinary obstruction     CAD (coronary artery disease)     Elevated cholesterol     Elevated PSA     Hypertension     Sleep apnea     CPAP     Family History   Problem Relation Name Age of  Onset    Heart disease Father      Cancer Sister      Heart disease Brother      Heart disease Brother       Social History     Socioeconomic History    Marital status:    Tobacco Use    Smoking status: Former     Types: Cigarettes     Passive exposure: Past    Smokeless tobacco: Never   Substance and Sexual Activity    Alcohol use: Not Currently     Comment: socially    Drug use: Never    Sexual activity: Yes     Partners: Female     Past Surgical History:   Procedure Laterality Date    CAROTID STENT      EYE SURGERY      HEMORRHOID SURGERY      INSERTION OF TUNNELED CENTRAL VENOUS CATHETER (CVC) WITH SUBCUTANEOUS PORT Right 10/21/2024    Procedure: INSERTION, PORT-A-CATH (Do 1st Pt. has Oncologist appt. after);  Surgeon: Moy Ruvalcaba MD;  Location: [facility];  Service: General;  Laterality: Right;  Right internal jugular    KNEE SURGERY      right knee replaced     PROSTATE BIOPSY           Review of systems:  Review of Systems   Constitutional:  Negative for activity change, appetite change, chills, diaphoresis, fatigue and unexpected weight change.   HENT:  Negative for congestion, facial swelling, hearing loss, mouth sores, trouble swallowing and voice change.    Eyes:  Negative for photophobia, pain, discharge and itching.   Respiratory:  Negative for apnea, cough, choking, chest tightness and shortness of breath.    Cardiovascular:  Negative for chest pain, palpitations and leg swelling.   Gastrointestinal:  Negative for abdominal distention, abdominal pain, anal bleeding and blood in stool.   Endocrine: Negative for cold intolerance, heat intolerance, polydipsia and polyphagia.   Genitourinary:  Negative for difficulty urinating, dysuria, flank pain and hematuria.   Musculoskeletal:  Negative for arthralgias, back pain, joint swelling, myalgias, neck pain and neck stiffness.   Skin:  Negative for color change, pallor and wound.   Allergic/Immunologic: Negative for environmental allergies, food  allergies and immunocompromised state.   Neurological:  Negative for dizziness, seizures, facial asymmetry, speech difficulty, light-headedness, numbness and headaches.   Hematological:  Negative for adenopathy. Does not bruise/bleed easily.   Psychiatric/Behavioral:  Negative for agitation, behavioral problems, confusion, decreased concentration and sleep disturbance.            Physical Exam  Vitals and nursing note reviewed.   Constitutional:       General: He is not in acute distress.     Appearance: Normal appearance. He is not ill-appearing.   HENT:      Head: Normocephalic and atraumatic.      Nose: No congestion or rhinorrhea.   Eyes:      General: No scleral icterus.     Extraocular Movements: Extraocular movements intact.      Pupils: Pupils are equal, round, and reactive to light.   Cardiovascular:      Rate and Rhythm: Normal rate and regular rhythm.      Pulses: Normal pulses.      Heart sounds: Normal heart sounds. No murmur heard.     No gallop.   Pulmonary:      Effort: Pulmonary effort is normal. No respiratory distress.      Breath sounds: Normal breath sounds. No stridor. No wheezing or rhonchi.   Abdominal:      General: Bowel sounds are normal. There is no distension.      Palpations: There is no mass.      Tenderness: There is no abdominal tenderness. There is no guarding.   Musculoskeletal:         General: No swelling, tenderness, deformity or signs of injury. Normal range of motion.      Cervical back: Normal range of motion and neck supple. No rigidity. No muscular tenderness.      Right lower leg: No edema.      Left lower leg: No edema.   Skin:     General: Skin is warm.      Coloration: Skin is not jaundiced or pale.      Findings: No bruising or lesion.   Neurological:      General: No focal deficit present.      Mental Status: He is alert and oriented to person, place, and time.      Cranial Nerves: No cranial nerve deficit.      Sensory: No sensory deficit.      Motor: No weakness.       Gait: Gait normal.   Psychiatric:         Mood and Affect: Mood normal.         Behavior: Behavior normal.         Thought Content: Thought content normal.     There were no vitals filed for this visit.    There is no height or weight on file to calculate BSA.    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  I will obtain PET scan to complete staging and to rule out distant metastatic disease.  If no evidence of distant metastatic disease then plan would be for chemo XRT approach likely considering his age.  If localized disease then patient would need EUS staging for completion.  Will also make referral to Radiation Oncology.  == 10/22/24: EUS staging endoscopy scheduled this week by Dr Ruvalcaba. TMB discussion with plan for concurrent chemo-XRT. PET scan for restaging showed no evidence of distant metastatic disease. Referral to Rad-onc was already placed previously.  ==10/30/2024:  Patient here today to discuss upcoming chemotherapy/immunotherapy. An extensive discussion was had which included a thorough discussion of potential side effect profile, risk versus benefits, and expected outcomes.  Risks, including but not limited to, possible hair loss, bone marrow damage, damage to body organs, allergic reactions, sterility, nausea/vomiting, constipation/diarrhea, sores in the mouth, secondary cancers, and rarely death were all discuss.  Specific side effects pertaining to their chemotherapy/immunotherapy medications were discussed as well.  The patient agrees with the plan and all questions and their support systems questions have been answered to their satisfaction.  Premedications were prescribed and patient was educated on appropriate usage.  Patient was educated on when to call, and given the number to call, or to notify the provider including but not limited to:  Persistent nausea and vomiting,  dehydration, fever greater than 100.4 lasting over 1 hour induration or isolated feeders greater than 101, rash while on active chemotherapy or immunotherapy, severe pain or new onset pain not controlled by current pain medication regimen. Patient is scheduled for initial visit with Dr. Bernal for XRT evaluation on 11/1/2024.  Will require evaluation, treatment planning/simulation so would not be able to start concurrent chemo/xrt next week.  Will likely be second week of November.  ==11/18/2024: Discussed EUS with Dr. Bernal who discussed with Dr. Chavez, will not change treatment plan thus was deferred.  Pt started XRT this am, labs reviewed and he is cleared to start carboplatin/taxol weekly concurrent with xrt  == 11/25/24: tolerating chem/xrt well with no side effects at this time.   ==12/2/2024: Pt has 15 more treatments of xrt, will finish on 12/23/24. Anc 1.7, tolerating chemotherapy and xrt well.  Having some mild constipation treating with prunes and mirilax, may also use senna prn. Labs reviewed, patient is cleared for chemotherapy. He reports very mild intermittent muscle cramps to bilateral legs at night will check magnesium with next labs due to carboplatin  ==12/9/2024: Pt had nausea and vomiting after chemotherapy last week, will send out compazine to alternate with zofran. Instructed to notify clinic if persists. Labs reviewed, pt is cleared for chemotherapy    Plan:  Continue carbo/taxol weekly concurrent with xrt  Cbc cmp weekly mg with next labs  Rtc 1 week       Total time spent in counseling and discussion about further management options including relevant lab work, treatment,  prognosis, medications and intended side effects was more than 40 minutes. More than 50% of the time was spent on counseling and coordination of care.  I spent a total of 40 minutes on the day of the visit.This includes face to face time and non-face to face time preparing to see the patient (eg, review of tests),  Obtaining and/or reviewing separately obtained history, Documenting clinical information in the electronic or other health record, Independently interpreting resultsand communicating results to the patient/family/caregiver, or Care coordination.

## 2024-12-13 LAB
ABS NRBC COUNT: 0 THOU/UL (ref 0–0.01)
ABSOLUTE BASOPHIL: 0 10*3/UL (ref 0–0.3)
ABSOLUTE EOSINOPHIL: 0 10*3/UL (ref 0–0.6)
ABSOLUTE IMMATURE GRAN: 0.02 THOU/UL (ref 0–0.03)
ABSOLUTE LYMPHOCYTE: 0.3 10*3/UL (ref 1.2–4)
ABSOLUTE MONOCYTE: 0.3 10*3/UL (ref 0.1–0.8)
ALBUMIN SERPL BCP-MCNC: 3.2 G/DL (ref 3.4–5)
ALBUMIN/GLOBULIN RATIO: 1 RATIO (ref 1.1–1.8)
ALP SERPL-CCNC: 64 U/L (ref 45–117)
ALT SERPL W P-5'-P-CCNC: 27 U/L (ref 16–61)
ANION GAP SERPL CALC-SCNC: 5 MMOL/L (ref 3–11)
AST SERPL-CCNC: 21 U/L (ref 15–37)
BASOPHILS NFR BLD: 1.1 % (ref 0–3)
BILIRUB SERPL-MCNC: 0.8 MG/DL (ref 0.2–1)
BUN SERPL-MCNC: 14 MG/DL (ref 7–18)
BUN/CREAT SERPL: 17.72 RATIO
CALCIUM SERPL-MCNC: 8.8 MG/DL (ref 8.5–10.1)
CHLORIDE SERPL-SCNC: 101 MMOL/L (ref 98–107)
CO2 SERPL-SCNC: 27 MMOL/L (ref 21–32)
CREAT SERPL-MCNC: 0.79 MG/DL (ref 0.7–1.3)
EOSINOPHIL NFR BLD: 1.5 % (ref 0–6)
ERYTHROCYTE [DISTWIDTH] IN BLOOD BY AUTOMATED COUNT: 13.2 % (ref 0–15.5)
GFR ESTIMATION: 89
GLOBULIN: 3.3 G/DL (ref 2.3–3.5)
GLUCOSE SERPL-MCNC: 96 MG/DL (ref 74–106)
HCT VFR BLD AUTO: 41.6 % (ref 42–52)
HGB BLD-MCNC: 13.3 G/DL (ref 14–18)
IMMATURE GRANULOCYTES: 0.7 % (ref 0–0.43)
LYMPHOCYTES NFR BLD: 11.9 % (ref 20–45)
MAGNESIUM SERPL-MCNC: 2.1 MG/DL (ref 1.6–2.6)
MCH RBC QN AUTO: 30.4 PG (ref 27–32)
MCHC RBC AUTO-ENTMCNC: 32 % (ref 32–36)
MCV RBC AUTO: 95.2 FL (ref 80–97)
MONOCYTES NFR BLD: 12.6 % (ref 2–10)
NEUTROPHILS # BLD AUTO: 1.9 10*3/UL (ref 1.4–7)
NEUTROPHILS NFR BLD: 72.2 % (ref 50–80)
NUCLEATED RED BLOOD CELLS: 0 % (ref 0–0.2)
PERIPHERAL SMEAR: NORMAL
PLATELETS: 129 10*3/UL (ref 130–400)
PMV BLD AUTO: 10 FL (ref 9.2–12.2)
POTASSIUM SERPL-SCNC: 4.2 MMOL/L (ref 3.5–5.1)
PROT SERPL-MCNC: 6.5 G/DL (ref 6.4–8.2)
RBC # BLD AUTO: 4.37 10*6/UL (ref 4.7–6.1)
SODIUM BLD-SCNC: 133 MMOL/L (ref 131–143)
WBC # BLD: 2.7 10*3/UL (ref 4.5–10)

## 2024-12-16 ENCOUNTER — OFFICE VISIT (OUTPATIENT)
Dept: HEMATOLOGY/ONCOLOGY | Facility: CLINIC | Age: 79
End: 2024-12-16
Payer: OTHER GOVERNMENT

## 2024-12-16 ENCOUNTER — DOCUMENTATION ONLY (OUTPATIENT)
Dept: HEMATOLOGY/ONCOLOGY | Facility: CLINIC | Age: 79
End: 2024-12-16

## 2024-12-16 VITALS
TEMPERATURE: 98 F | OXYGEN SATURATION: 97 % | RESPIRATION RATE: 16 BRPM | DIASTOLIC BLOOD PRESSURE: 57 MMHG | HEART RATE: 91 BPM | BODY MASS INDEX: 23.59 KG/M2 | SYSTOLIC BLOOD PRESSURE: 105 MMHG | HEIGHT: 72 IN | WEIGHT: 174.19 LBS

## 2024-12-16 DIAGNOSIS — C16.0 GE JUNCTION CARCINOMA: ICD-10-CM

## 2024-12-16 DIAGNOSIS — T45.1X5A CHEMOTHERAPY-INDUCED NAUSEA AND VOMITING: ICD-10-CM

## 2024-12-16 DIAGNOSIS — R11.2 CHEMOTHERAPY-INDUCED NAUSEA AND VOMITING: ICD-10-CM

## 2024-12-16 DIAGNOSIS — C15.8: Primary | ICD-10-CM

## 2024-12-16 DIAGNOSIS — C15.9 ESOPHAGEAL ADENOCARCINOMA: ICD-10-CM

## 2024-12-16 DIAGNOSIS — C16.0 GE JUNCTION CARCINOMA: Primary | ICD-10-CM

## 2024-12-16 PROCEDURE — G2211 COMPLEX E/M VISIT ADD ON: HCPCS | Mod: S$PBB,,, | Performed by: NURSE PRACTITIONER

## 2024-12-16 PROCEDURE — 99215 OFFICE O/P EST HI 40 MIN: CPT | Mod: S$PBB,,, | Performed by: NURSE PRACTITIONER

## 2024-12-16 RX ORDER — SODIUM CHLORIDE 0.9 % (FLUSH) 0.9 %
10 SYRINGE (ML) INJECTION
Status: CANCELLED | OUTPATIENT
Start: 2024-12-16

## 2024-12-16 RX ORDER — FAMOTIDINE 10 MG/ML
20 INJECTION INTRAVENOUS
Status: CANCELLED | OUTPATIENT
Start: 2024-12-16 | End: 2024-12-16

## 2024-12-16 RX ORDER — HEPARIN 100 UNIT/ML
500 SYRINGE INTRAVENOUS
Status: CANCELLED | OUTPATIENT
Start: 2024-12-16

## 2024-12-16 RX ORDER — DIPHENHYDRAMINE HYDROCHLORIDE 50 MG/ML
50 INJECTION INTRAMUSCULAR; INTRAVENOUS ONCE AS NEEDED
Status: CANCELLED | OUTPATIENT
Start: 2024-12-16

## 2024-12-16 RX ORDER — EPINEPHRINE 0.3 MG/.3ML
0.3 INJECTION SUBCUTANEOUS ONCE AS NEEDED
Status: CANCELLED | OUTPATIENT
Start: 2024-12-16

## 2024-12-16 NOTE — PROGRESS NOTES
MEDICAL ONCOLOGY FOLLOW UP CONSULTATION NOTE    Patient ID: Marcelino Al is a 79 y.o. male.    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  Patient presents to our clinic today for further evaluation      Pathology:    A.  Gastric antrum biopsy:  Chronic gastritis    B.  GE junction biopsy x3:  Moderately differentiated adenocarcinoma in a background of Addison's esophagus, see synoptic.      Synoptic report:  Procedure.  Biopsy  Tumor site: GE junction  Histologic type: Adenocarcinoma  Histologic grade: G2 of G3: Moderately differentiated  Tumor extent: Invades lamina propria: At least  Lymphatic and or vascular invasion: Not identified  Perineural invasion: Not identified  Additional findings: Addison's esophagus    C.  Esophageal mass at 30 cm:  Moderately differentiated adenocarcinoma in a background of Addison's esophagus, see synoptic.    Synoptic report:     Histologic type: Adenocarcinoma  Histologic grade: G2 of G3: Moderately differentiated  Tumor extent: Suspicious for invasion into submucosa  Lymphatic and vascular invasion: Not identified  Perineural invasion: Not identified        Imaging:   10/12/2024 CT PET  Impression:     1. Hypermetabolic mass of the distal esophagus consistent with patient's known malignancy with a metastatic paraesophageal superior mediastinal lymph node.  2. Radiotracer uptake in nondisplaced fractures of the right 8th, 9th, and 10th ribs.  There is no underlying osseous metastasis    Past Medical History:   Diagnosis Date    BPH with urinary obstruction     CAD (coronary artery disease)     Elevated cholesterol     Elevated PSA     Hypertension     Sleep apnea     CPAP     Family History   Problem Relation Name Age of  Onset    Heart disease Father      Cancer Sister      Heart disease Brother      Heart disease Brother       Social History     Socioeconomic History    Marital status:    Tobacco Use    Smoking status: Former     Types: Cigarettes     Passive exposure: Past    Smokeless tobacco: Never   Substance and Sexual Activity    Alcohol use: Not Currently     Comment: socially    Drug use: Never    Sexual activity: Yes     Partners: Female     Past Surgical History:   Procedure Laterality Date    CAROTID STENT      EYE SURGERY      HEMORRHOID SURGERY      INSERTION OF TUNNELED CENTRAL VENOUS CATHETER (CVC) WITH SUBCUTANEOUS PORT Right 10/21/2024    Procedure: INSERTION, PORT-A-CATH (Do 1st Pt. has Oncologist appt. after);  Surgeon: Moy Ruvalcaba MD;  Location: [facility];  Service: General;  Laterality: Right;  Right internal jugular    KNEE SURGERY      right knee replaced     PROSTATE BIOPSY           Review of systems:  Review of Systems   Constitutional:  Negative for activity change, appetite change, chills, diaphoresis, fatigue and unexpected weight change.   HENT:  Negative for congestion, facial swelling, hearing loss, mouth sores, trouble swallowing and voice change.    Eyes:  Negative for photophobia, pain, discharge and itching.   Respiratory:  Negative for apnea, cough, choking, chest tightness and shortness of breath.    Cardiovascular:  Negative for chest pain, palpitations and leg swelling.   Gastrointestinal:  Negative for abdominal distention, abdominal pain, anal bleeding and blood in stool.   Endocrine: Negative for cold intolerance, heat intolerance, polydipsia and polyphagia.   Genitourinary:  Negative for difficulty urinating, dysuria, flank pain and hematuria.   Musculoskeletal:  Negative for arthralgias, back pain, joint swelling, myalgias, neck pain and neck stiffness.   Skin:  Negative for color change, pallor and wound.   Allergic/Immunologic: Negative for environmental allergies, food  allergies and immunocompromised state.   Neurological:  Negative for dizziness, seizures, facial asymmetry, speech difficulty, light-headedness, numbness and headaches.   Hematological:  Negative for adenopathy. Does not bruise/bleed easily.   Psychiatric/Behavioral:  Negative for agitation, behavioral problems, confusion, decreased concentration and sleep disturbance.            Physical Exam  Vitals and nursing note reviewed.   Constitutional:       General: He is not in acute distress.     Appearance: Normal appearance. He is not ill-appearing.   HENT:      Head: Normocephalic and atraumatic.      Nose: No congestion or rhinorrhea.   Eyes:      General: No scleral icterus.     Extraocular Movements: Extraocular movements intact.      Pupils: Pupils are equal, round, and reactive to light.   Cardiovascular:      Rate and Rhythm: Normal rate and regular rhythm.      Pulses: Normal pulses.      Heart sounds: Normal heart sounds. No murmur heard.     No gallop.   Pulmonary:      Effort: Pulmonary effort is normal. No respiratory distress.      Breath sounds: Normal breath sounds. No stridor. No wheezing or rhonchi.   Abdominal:      General: Bowel sounds are normal. There is no distension.      Palpations: There is no mass.      Tenderness: There is no abdominal tenderness. There is no guarding.   Musculoskeletal:         General: No swelling, tenderness, deformity or signs of injury. Normal range of motion.      Cervical back: Normal range of motion and neck supple. No rigidity. No muscular tenderness.      Right lower leg: No edema.      Left lower leg: No edema.   Skin:     General: Skin is warm.      Coloration: Skin is not jaundiced or pale.      Findings: No bruising or lesion.   Neurological:      General: No focal deficit present.      Mental Status: He is alert and oriented to person, place, and time.      Cranial Nerves: No cranial nerve deficit.      Sensory: No sensory deficit.      Motor: No weakness.       Gait: Gait normal.   Psychiatric:         Mood and Affect: Mood normal.         Behavior: Behavior normal.         Thought Content: Thought content normal.     Vitals:    12/16/24 0954   BP: (!) 105/57   Pulse: 91   Resp: 16   Temp: 98 °F (36.7 °C)       Body surface area is 2 meters squared.    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  I will obtain PET scan to complete staging and to rule out distant metastatic disease.  If no evidence of distant metastatic disease then plan would be for chemo XRT approach likely considering his age.  If localized disease then patient would need EUS staging for completion.  Will also make referral to Radiation Oncology.  == 10/22/24: EUS staging endoscopy scheduled this week by Dr Ruvalcaba. TMB discussion with plan for concurrent chemo-XRT. PET scan for restaging showed no evidence of distant metastatic disease. Referral to Rad-onc was already placed previously.  ==10/30/2024:  Patient here today to discuss upcoming chemotherapy/immunotherapy. An extensive discussion was had which included a thorough discussion of potential side effect profile, risk versus benefits, and expected outcomes.  Risks, including but not limited to, possible hair loss, bone marrow damage, damage to body organs, allergic reactions, sterility, nausea/vomiting, constipation/diarrhea, sores in the mouth, secondary cancers, and rarely death were all discuss.  Specific side effects pertaining to their chemotherapy/immunotherapy medications were discussed as well.  The patient agrees with the plan and all questions and their support systems questions have been answered to their satisfaction.  Premedications were prescribed and patient was educated on appropriate usage.  Patient was educated on when to call, and given the number to call, or to notify the provider including but not limited to:   Persistent nausea and vomiting, dehydration, fever greater than 100.4 lasting over 1 hour induration or isolated feeders greater than 101, rash while on active chemotherapy or immunotherapy, severe pain or new onset pain not controlled by current pain medication regimen. Patient is scheduled for initial visit with Dr. Bernal for XRT evaluation on 11/1/2024.  Will require evaluation, treatment planning/simulation so would not be able to start concurrent chemo/xrt next week.  Will likely be second week of November.  ==11/18/2024: Discussed EUS with Dr. Bernal who discussed with Dr. Chavez, will not change treatment plan thus was deferred.  Pt started XRT this am, labs reviewed and he is cleared to start carboplatin/taxol weekly concurrent with xrt  == 11/25/24: tolerating chem/xrt well with no side effects at this time.   ==12/2/2024: Pt has 15 more treatments of xrt, will finish on 12/23/24. Anc 1.7, tolerating chemotherapy and xrt well.  Having some mild constipation treating with prunes and mirilax, may also use senna prn. Labs reviewed, patient is cleared for chemotherapy. He reports very mild intermittent muscle cramps to bilateral legs at night will check magnesium with next labs due to carboplatin  ==12/9/2024: Pt had nausea and vomiting after chemotherapy last week, will send out compazine to alternate with zofran. Instructed to notify clinic if persists. Labs reviewed, pt is cleared for chemotherapy  ==12/16/2024: Pt to clinic will receive last cycle chemotherapy concurrent with xrt. He would like to see Dr. Hannah in [place], he has call in to [facility] for referral authorization.  Tolerating chemotherapy well, today will be final treatment. Will order post treatment scan for 4 weeks and have pt rtc with md    Plan:  Continue carbo/taxol weekly concurrent with xrt  Cbc cmp weekly mg with next labs  Rtc 5 week   Pet scan in 4 weeks    Total time spent in counseling and discussion about further  management options including relevant lab work, treatment,  prognosis, medications and intended side effects was more than 40 minutes. More than 50% of the time was spent on counseling and coordination of care.  I spent a total of 40 minutes on the day of the visit.This includes face to face time and non-face to face time preparing to see the patient (eg, review of tests), Obtaining and/or reviewing separately obtained history, Documenting clinical information in the electronic or other health record, Independently interpreting resultsand communicating results to the patient/family/caregiver, or Care coordination.

## 2024-12-23 ENCOUNTER — TELEPHONE (OUTPATIENT)
Dept: HEMATOLOGY/ONCOLOGY | Facility: CLINIC | Age: 79
End: 2024-12-23
Payer: OTHER GOVERNMENT

## 2024-12-23 NOTE — TELEPHONE ENCOUNTER
----- Message from Faiza sent at 12/23/2024  8:03 AM CST -----  Contact: self  Type:  Patient Returning Call    Who Called:Marcelino Al  Who Left Message for Patient:unsure  Does the patient know what this is regarding?:after visit summary, [facility]#[phone] Attn KK  Would the patient rather a call back or a response via [facility]?   Best Call Back Number:[phone]  Additional Information: n/a

## 2024-12-30 ENCOUNTER — TELEPHONE (OUTPATIENT)
Dept: HEMATOLOGY/ONCOLOGY | Facility: CLINIC | Age: 79
End: 2024-12-30
Payer: OTHER GOVERNMENT

## 2024-12-30 NOTE — TELEPHONE ENCOUNTER
----- Message from Rosa sent at 12/30/2024 10:47 AM CST -----  Contact: pt  Mellisa beasley/[facility] calling for last process notes for pt on 12/16/2024 and she can be reached  at [phone] and fax number [phone] attn Mellisa.    Thanks,

## 2025-01-06 ENCOUNTER — TELEPHONE (OUTPATIENT)
Dept: HEMATOLOGY/ONCOLOGY | Facility: CLINIC | Age: 80
End: 2025-01-06
Payer: OTHER GOVERNMENT

## 2025-01-06 NOTE — TELEPHONE ENCOUNTER
Returned pt's call about symptoms he was experiencing like dizziness and lowering of blood pressure when getting up too fast and if had anything to do with his Treatment. Told pt that it was most likely not having to do with his treatment due to him last taking it on 12/16. Ask our RN if it possibly could be treatment and she confirmed it most likley wasn't the treatment causing his issues.

## 2025-01-24 ENCOUNTER — TELEPHONE (OUTPATIENT)
Dept: HEMATOLOGY/ONCOLOGY | Facility: CLINIC | Age: 80
End: 2025-01-24

## 2025-01-27 ENCOUNTER — OFFICE VISIT (OUTPATIENT)
Dept: HEMATOLOGY/ONCOLOGY | Facility: CLINIC | Age: 80
End: 2025-01-27
Payer: OTHER GOVERNMENT

## 2025-01-27 VITALS
DIASTOLIC BLOOD PRESSURE: 71 MMHG | HEIGHT: 69 IN | HEART RATE: 81 BPM | SYSTOLIC BLOOD PRESSURE: 151 MMHG | OXYGEN SATURATION: 96 % | RESPIRATION RATE: 16 BRPM | TEMPERATURE: 98 F | BODY MASS INDEX: 25.35 KG/M2 | WEIGHT: 171.13 LBS

## 2025-01-27 DIAGNOSIS — C16.0 GE JUNCTION CARCINOMA: Primary | ICD-10-CM

## 2025-01-27 PROCEDURE — 99215 OFFICE O/P EST HI 40 MIN: CPT | Mod: S$PBB,,, | Performed by: INTERNAL MEDICINE

## 2025-01-27 NOTE — PROGRESS NOTES
MEDICAL ONCOLOGY FOLLOW UP CONSULTATION NOTE    Patient ID: Marcelino Al is a 79 y.o. male.    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  Patient presents to our clinic today for further evaluation      Pathology:    A.  Gastric antrum biopsy:  Chronic gastritis    B.  GE junction biopsy x3:  Moderately differentiated adenocarcinoma in a background of Addison's esophagus, see synoptic.      Synoptic report:  Procedure.  Biopsy  Tumor site: GE junction  Histologic type: Adenocarcinoma  Histologic grade: G2 of G3: Moderately differentiated  Tumor extent: Invades lamina propria: At least  Lymphatic and or vascular invasion: Not identified  Perineural invasion: Not identified  Additional findings: Addison's esophagus    C.  Esophageal mass at 30 cm:  Moderately differentiated adenocarcinoma in a background of Addison's esophagus, see synoptic.    Synoptic report:     Histologic type: Adenocarcinoma  Histologic grade: G2 of G3: Moderately differentiated  Tumor extent: Suspicious for invasion into submucosa  Lymphatic and vascular invasion: Not identified  Perineural invasion: Not identified        Imaging:   10/12/2024 CT PET  Impression:     1. Hypermetabolic mass of the distal esophagus consistent with patient's known malignancy with a metastatic paraesophageal superior mediastinal lymph node.  2. Radiotracer uptake in nondisplaced fractures of the right 8th, 9th, and 10th ribs.  There is no underlying osseous metastasis    Past Medical History:   Diagnosis Date    BPH with urinary obstruction     CAD (coronary artery disease)     Elevated cholesterol     Elevated PSA     Hypertension     Sleep apnea     CPAP     Family History   Problem Relation Name Age of  Onset    Heart disease Father      Cancer Sister      Heart disease Brother      Heart disease Brother       Social History     Socioeconomic History    Marital status:    Tobacco Use    Smoking status: Former     Types: Cigarettes     Passive exposure: Past    Smokeless tobacco: Never   Substance and Sexual Activity    Alcohol use: Not Currently     Comment: socially    Drug use: Never    Sexual activity: Yes     Partners: Female     Past Surgical History:   Procedure Laterality Date    CAROTID STENT      EYE SURGERY      HEMORRHOID SURGERY      INSERTION OF TUNNELED CENTRAL VENOUS CATHETER (CVC) WITH SUBCUTANEOUS PORT Right 10/21/2024    Procedure: INSERTION, PORT-A-CATH (Do 1st Pt. has Oncologist appt. after);  Surgeon: Moy Ruvalcaba MD;  Location: [facility];  Service: General;  Laterality: Right;  Right internal jugular    KNEE SURGERY      right knee replaced     PROSTATE BIOPSY           Review of systems:  Review of Systems   Constitutional:  Negative for activity change, appetite change, chills, diaphoresis, fatigue and unexpected weight change.   HENT:  Negative for congestion, facial swelling, hearing loss, mouth sores, trouble swallowing and voice change.    Eyes:  Negative for photophobia, pain, discharge and itching.   Respiratory:  Negative for apnea, cough, choking, chest tightness and shortness of breath.    Cardiovascular:  Negative for chest pain, palpitations and leg swelling.   Gastrointestinal:  Negative for abdominal distention, abdominal pain, anal bleeding and blood in stool.   Endocrine: Negative for cold intolerance, heat intolerance, polydipsia and polyphagia.   Genitourinary:  Negative for difficulty urinating, dysuria, flank pain and hematuria.   Musculoskeletal:  Negative for arthralgias, back pain, joint swelling, myalgias, neck pain and neck stiffness.   Skin:  Negative for color change, pallor and wound.   Allergic/Immunologic: Negative for environmental allergies, food  allergies and immunocompromised state.   Neurological:  Negative for dizziness, seizures, facial asymmetry, speech difficulty, light-headedness, numbness and headaches.   Hematological:  Negative for adenopathy. Does not bruise/bleed easily.   Psychiatric/Behavioral:  Negative for agitation, behavioral problems, confusion, decreased concentration and sleep disturbance.            Physical Exam  Vitals and nursing note reviewed.   Constitutional:       General: He is not in acute distress.     Appearance: Normal appearance. He is not ill-appearing.   HENT:      Head: Normocephalic and atraumatic.      Nose: No congestion or rhinorrhea.   Eyes:      General: No scleral icterus.     Extraocular Movements: Extraocular movements intact.      Pupils: Pupils are equal, round, and reactive to light.   Cardiovascular:      Rate and Rhythm: Normal rate and regular rhythm.      Pulses: Normal pulses.      Heart sounds: Normal heart sounds. No murmur heard.     No gallop.   Pulmonary:      Effort: Pulmonary effort is normal. No respiratory distress.      Breath sounds: Normal breath sounds. No stridor. No wheezing or rhonchi.   Abdominal:      General: Bowel sounds are normal. There is no distension.      Palpations: There is no mass.      Tenderness: There is no abdominal tenderness. There is no guarding.   Musculoskeletal:         General: No swelling, tenderness, deformity or signs of injury. Normal range of motion.      Cervical back: Normal range of motion and neck supple. No rigidity. No muscular tenderness.      Right lower leg: No edema.      Left lower leg: No edema.   Skin:     General: Skin is warm.      Coloration: Skin is not jaundiced or pale.      Findings: No bruising or lesion.   Neurological:      General: No focal deficit present.      Mental Status: He is alert and oriented to person, place, and time.      Cranial Nerves: No cranial nerve deficit.      Sensory: No sensory deficit.      Motor: No weakness.       Gait: Gait normal.   Psychiatric:         Mood and Affect: Mood normal.         Behavior: Behavior normal.         Thought Content: Thought content normal.       Vitals:    01/27/25 0917   BP: (!) 151/71   Pulse: 81   Resp: 16   Temp: 98 °F (36.7 °C)       Body surface area is 1.94 meters squared.    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  I will obtain PET scan to complete staging and to rule out distant metastatic disease.  If no evidence of distant metastatic disease then plan would be for chemo XRT approach likely considering his age.  If localized disease then patient would need EUS staging for completion.  Will also make referral to Radiation Oncology.  == 10/22/24: EUS staging endoscopy scheduled this week by Dr Ruvalcaba. TMB discussion with plan for concurrent chemo-XRT. PET scan for restaging showed no evidence of distant metastatic disease. Referral to Rad-onc was already placed previously.  ==10/30/24:  Patient here today to discuss upcoming chemotherapy/immunotherapy. An extensive discussion was had which included a thorough discussion of potential side effect profile, risk versus benefits, and expected outcomes.  Risks, including but not limited to, possible hair loss, bone marrow damage, damage to body organs, allergic reactions, sterility, nausea/vomiting, constipation/diarrhea, sores in the mouth, secondary cancers, and rarely death were all discuss.  Specific side effects pertaining to their chemotherapy/immunotherapy medications were discussed as well.  The patient agrees with the plan and all questions and their support systems questions have been answered to their satisfaction.  Premedications were prescribed and patient was educated on appropriate usage.  Patient was educated on when to call, and given the number to call, or to notify the provider including but not limited  to:  Persistent nausea and vomiting, dehydration, fever greater than 100.4 lasting over 1 hour induration or isolated feeders greater than 101, rash while on active chemotherapy or immunotherapy, severe pain or new onset pain not controlled by current pain medication regimen. Patient is scheduled for initial visit with Dr. Bernal for XRT evaluation on 11/1/2024.  Will require evaluation, treatment planning/simulation so would not be able to start concurrent chemo/xrt next week.  Will likely be second week of November.  ==11/18/24: Discussed EUS with Dr. Bernal who discussed with Dr. Chavez, will not change treatment plan thus was deferred.  Pt started XRT this am, labs reviewed and he is cleared to start carboplatin/taxol weekly concurrent with xrt  == 11/25/24: tolerating chem/xrt well with no side effects at this time.   ==12/20/24: Pt has 15 more treatments of xrt, will finish on 12/23/24. Anc 1.7, tolerating chemotherapy and xrt well.  Having some mild constipation treating with prunes and mirilax, may also use senna prn. Labs reviewed, patient is cleared for chemotherapy. He reports very mild intermittent muscle cramps to bilateral legs at night will check magnesium with next labs due to carboplatin  ==12/9/24: Pt had nausea and vomiting after chemotherapy last week, will send out compazine to alternate with zofran. Instructed to notify clinic if persists. Labs reviewed, pt is cleared for chemotherapy  ==12/16/24: Pt to clinic will receive last cycle chemotherapy concurrent with xrt. He would like to see Dr. Hannah in [place], he has call in to [facility] for referral authorization.  Tolerating chemotherapy well, today will be final treatment. Will order post treatment scan for 4 weeks and have pt rtc with md  == 1/27/25: S/p concurrent chemo-XRT. Doing well. Recent PET scan even though direct comparison not available but no evidence of metastatic disease. Patient has yet to see surgery and reports  awaiting [facility] before he sees Dr Farias    Plan:    Keep appointment with Surgery and Rad-onc  Port flush q 2-3 monthly      RTC in 2 months for NP visit with labs for follow-up        Total time spent in counseling and discussion about further management options including relevant lab work, treatment,  prognosis, medications and intended side effects was more than 60 minutes. More than 50% of the time was spent on counseling and coordination of care.  I spent a total of 60 minutes on the day of the visit.This includes face to face time and non-face to face time preparing to see the patient (eg, review of tests), Obtaining and/or reviewing separately obtained history, Documenting clinical information in the electronic or other health record, Independently interpreting resultsand communicating results to the patient/family/caregiver, or Care coordination.

## 2025-02-07 ENCOUNTER — HOSPITAL ENCOUNTER (OUTPATIENT)
Dept: RADIOLOGY | Facility: HOSPITAL | Age: 80
Discharge: HOME | End: 2025-02-07
Attending: INTERNAL MEDICINE
Payer: MEDICARE

## 2025-02-07 DIAGNOSIS — R05.9 COUGH: ICD-10-CM

## 2025-02-07 PROCEDURE — 71046 X-RAY EXAM CHEST 2 VIEWS: CPT | Mod: TC

## 2025-02-14 DIAGNOSIS — D00.1 CARCINOMA IN SITU OF ESOPHAGUS: Primary | ICD-10-CM

## 2025-02-26 NOTE — H&P (VIEW-ONLY)
Surgical Oncology Clinic    Patient ID: [id number]     Chief Complaint: Esophageal Adenocarcinoma     HPI:     Marcelino Al is a 79 y.o. male referred by Dr Farias. In September 2024,  he reported unintentional weight loss and dysphagia and was seen by Dr STEFANIA Ruvalcaba. EGD completed at that time revealed esophageal mass. 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     PET CT 10/11/24 noted hypermetabolic mass of distal esophagus with SUV max of 9, paraesophageal lymph node in superior mediastinum, SUV max 3.7. Port placed per Dr Ruvalcaba on 10/21/24.     Radiation initiated per Rad Onc, Dr Bernal, 11/2024. Chemotherapy initiated around same time and finished at the end of December 2024.  Post chemo PET on 1/20/25 with persistent distal esophageal FDG uptake, SUV max now 5.2. No metastatic lymph nodes seen.     Patient underwent EUS per Dr Farias on 2/3/25 again revealed irregular changes in the esophagus and adjacent enlarged nodes consistent with T2N1MX disease. Biopsy consistent with invasive adenocarcinoma. Tumor markers not drawn.       CAD status post stenting in 2005 followed by Dr Maier.  Maintains excellent functional status for his age.  Remains active.  No chest pain dyspnea on exertion, nocturnal dyspnea or edema.  Quit smoking many years ago.      63 minutes was required for complete chart review, imaging review including interpretation of imaging, coordination with referring/other physicians, patient counseling regarding diagnosis/treatment plan, answering questions, medical decision making, and documentation.    Visit today included increased complexity associated with the care of the episodic problem esophageal cancer addressed and managing the longitudinal care of the patient due to the serious and/or complex managed problem(s) .  Past Medical History:   Diagnosis Date    BPH with urinary obstruction      CAD (coronary artery disease)     Elevated cholesterol     Elevated PSA     Hypertension     Sleep apnea     CPAP        Past Surgical History:   Procedure Laterality Date    CAROTID STENT      EYE SURGERY      HEMORRHOID SURGERY      INSERTION OF TUNNELED CENTRAL VENOUS CATHETER (CVC) WITH SUBCUTANEOUS PORT Right 10/21/2024    Procedure: INSERTION, PORT-A-CATH (Do 1st Pt. has Oncologist appt. after);  Surgeon: Moy Ruvalcaba MD;  Location: [facility];  Service: General;  Laterality: Right;  Right internal jugular    JOINT REPLACEMENT      KNEE SURGERY      right knee replaced     PROSTATE BIOPSY      PROSTATE SURGERY          Social History     Tobacco Use    Smoking status: Former     Current packs/day: 0.00     Average packs/day: 1 pack/day for 30.0 years (30.0 ttl pk-yrs)     Types: Cigarettes     Start date: 11/10/1965     Quit date: 11/15/1995     Years since quittin.3     Passive exposure: Past    Smokeless tobacco: Never   Substance and Sexual Activity    Alcohol use: Yes     Alcohol/week: 8.0 standard drinks of alcohol     Types: 8 Glasses of wine per week     Comment: socially    Drug use: Never    Sexual activity: Not Currently     Partners: Female     Birth control/protection: None        Current Outpatient Medications   Medication Instructions    amLODIPine (NORVASC) 5 mg, Oral, Nightly    aspirin (ECOTRIN) 81 mg, Every morning    atorvastatin (LIPITOR) 80 mg, Every morning    cyanocobalamin (VITAMIN B-12) 1,000 mcg, Every morning    ezetimibe (ZETIA) 10 mg, Every morning    LIDOcaine-prilocaine (EMLA) cream Topical (Top), As needed (PRN)    losartan (COZAAR) 25 mg, 2 times daily    multivitamin-minerals-lutein (MULTIVITAMIN 50 PLUS) Tab 1 tablet, Every morning    ondansetron (ZOFRAN) 8 mg, Oral, Every 8 hours PRN    pantoprazole (PROTONIX) 40 mg, Every morning    prochlorperazine (COMPAZINE) 10 mg, Oral, Every 8 hours PRN    traMADoL (ULTRAM) 50 mg tablet 1 tablet, Daily PRN    traZODone  "(DESYREL) 100 mg, Nightly       Review of patient's allergies indicates:  No Known Allergies     Patient Care Team:  Kady Miller MD as PCP - General (Internal Medicine)     Subjective:     Review of Systems   Constitutional:  Negative for activity change, appetite change, chills, diaphoresis, fatigue and fever.   HENT:  Negative for congestion, ear pain, tinnitus and trouble swallowing.    Eyes:  Negative for photophobia and pain.   Respiratory:  Negative for apnea, cough, choking, chest tightness, shortness of breath and stridor.    Cardiovascular:  Negative for chest pain, palpitations and leg swelling.   Endocrine: Negative for cold intolerance and heat intolerance.   Genitourinary:  Negative for difficulty urinating, dysuria, enuresis, flank pain, frequency and hematuria.   Musculoskeletal:  Negative for arthralgias, back pain and gait problem.   Neurological:  Negative for dizziness, seizures, syncope, facial asymmetry, speech difficulty, weakness, light-headedness, numbness and headaches.   Psychiatric/Behavioral:  Negative for agitation, behavioral problems, confusion and decreased concentration.    All other systems reviewed and are negative.      12 point review of systems conducted, negative except as stated in the history of present illness. See HPI for details.    Objective:     Visit Vitals  BP (!) 146/83 (BP Location: Left arm, Patient Position: Sitting)   Pulse 75   Ht 5' 9" (1.753 m)   Wt 83 kg (183 lb)   SpO2 97%   BMI 27.02 kg/m²       Physical Exam  Constitutional:       General: He is not in acute distress.     Appearance: Normal appearance. He is well-developed and normal weight. He is not ill-appearing, toxic-appearing or diaphoretic.   HENT:      Head: Normocephalic and atraumatic.      Right Ear: Hearing and external ear normal.      Left Ear: Hearing and external ear normal.      Nose: No congestion or rhinorrhea.      Mouth/Throat:      Mouth: Mucous membranes are moist.      " Pharynx: Oropharynx is clear. No oropharyngeal exudate.   Eyes:      General: Lids are normal. Gaze aligned appropriately. No scleral icterus.     Extraocular Movements: Extraocular movements intact.      Right eye: Normal extraocular motion and no nystagmus.      Left eye: Normal extraocular motion and no nystagmus.      Conjunctiva/sclera: Conjunctivae normal.      Right eye: Right conjunctiva is not injected.      Left eye: Left conjunctiva is not injected.      Pupils: Pupils are equal, round, and reactive to light.   Neck:      Vascular: No carotid bruit or JVD.      Trachea: Trachea and phonation normal.   Cardiovascular:      Rate and Rhythm: Normal rate and regular rhythm.      Pulses: Normal pulses.      Heart sounds: Normal heart sounds. No murmur heard.     No friction rub. No gallop.   Pulmonary:      Effort: Pulmonary effort is normal. No tachypnea, accessory muscle usage, respiratory distress or retractions.      Breath sounds: Normal breath sounds and air entry. No stridor or decreased air movement. No wheezing or rhonchi.   Chest:      Chest wall: No mass, deformity, swelling, tenderness or crepitus.   Abdominal:      General: Abdomen is flat. Bowel sounds are normal. There is no distension. There are no signs of injury.      Palpations: Abdomen is soft. There is no shifting dullness, fluid wave, hepatomegaly, splenomegaly or mass.      Tenderness: There is no abdominal tenderness. There is no guarding or rebound.      Hernia: No hernia is present.   Musculoskeletal:         General: No swelling or tenderness.      Cervical back: Normal range of motion and neck supple. No rigidity, tenderness or crepitus.   Lymphadenopathy:      Head:      Right side of head: No submental, submandibular or occipital adenopathy.      Left side of head: No submental, submandibular or occipital adenopathy.      Cervical: No cervical adenopathy.      Right cervical: No superficial or posterior cervical adenopathy.      Left cervical: No superficial or posterior cervical adenopathy.      Upper Body:      Right upper body: No supraclavicular or axillary adenopathy.      Left upper body: No supraclavicular or axillary adenopathy.      Lower Body: No right inguinal adenopathy. No left inguinal adenopathy.   Skin:     General: Skin is warm.      Capillary Refill: Capillary refill takes less than 2 seconds.      Coloration: Skin is not cyanotic, jaundiced, mottled or pale.      Findings: No bruising, ecchymosis, erythema, lesion, petechiae or rash.      Nails: There is no clubbing.   Neurological:      General: No focal deficit present.      Mental Status: He is alert and oriented to person, place, and time.      Cranial Nerves: No cranial nerve deficit or facial asymmetry.      Sensory: No sensory deficit.      Motor: No weakness, tremor, atrophy or abnormal muscle tone.      Coordination: Coordination normal.      Gait: Gait normal.      Deep Tendon Reflexes: Reflexes normal.   Psychiatric:         Attention and Perception: Attention and perception normal.         Mood and Affect: Mood normal. Mood is not anxious or depressed. Affect is not blunt or flat.         Speech: Speech normal. Speech is not slurred.         Behavior: Behavior normal. Behavior is cooperative.         Assessment/Plan:     Distal esophageal adenocarcinoma, status post neoadjuvant chemoradiation.  PET-CT scan shows localized disease without evidence of distant metastasis, repeat endoscopy shows persistent disease.    Diagnosis, staging, prognosis and treatment guidelines for esophageal cancer were discussed with the patient in detail, all questions were addressed.Using visual aids and drawings, I described the anatomy and potential surgical procedures.    Preoperative cardiac evaluation    Schedule laparoscopic/robotic transhiatal esophagectomy, placement of feeding jejunostomy tube    The risks and benefits of the procedure were explained in detail using layman  terms, including the pros and cons of any implant that may be used, all questions were addressed, the patient gives consent to proceed          Gabe Talbot MD  Surgical Oncology  Complex General, Gastrointestinal and Hepatobiliary Surgery

## 2025-02-26 NOTE — PROGRESS NOTES
Surgical Oncology Clinic    Patient ID: [id number]     Chief Complaint: Esophageal Adenocarcinoma     HPI:     Marcelino Al is a 79 y.o. male referred by Dr Farias. In September 2024,  he reported unintentional weight loss and dysphagia and was seen by Dr STEFANIA Ruvalcaba. EGD completed at that time revealed esophageal mass. 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     PET CT 10/11/24 noted hypermetabolic mass of distal esophagus with SUV max of 9, paraesophageal lymph node in superior mediastinum, SUV max 3.7. Port placed per Dr Ruvalcaba on 10/21/24.     Radiation initiated per Rad Onc, Dr Bernal, 11/2024. Chemotherapy initiated around same time and finished at the end of December 2024.  Post chemo PET on 1/20/25 with persistent distal esophageal FDG uptake, SUV max now 5.2. No metastatic lymph nodes seen.     Patient underwent EUS per Dr Farias on 2/3/25 again revealed irregular changes in the esophagus and adjacent enlarged nodes consistent with T2N1MX disease. Biopsy consistent with invasive adenocarcinoma. Tumor markers not drawn.       CAD status post stenting in 2005 followed by Dr Maier.  Maintains excellent functional status for his age.  Remains active.  No chest pain dyspnea on exertion, nocturnal dyspnea or edema.  Quit smoking many years ago.      63 minutes was required for complete chart review, imaging review including interpretation of imaging, coordination with referring/other physicians, patient counseling regarding diagnosis/treatment plan, answering questions, medical decision making, and documentation.    Visit today included increased complexity associated with the care of the episodic problem esophageal cancer addressed and managing the longitudinal care of the patient due to the serious and/or complex managed problem(s) .  Past Medical History:   Diagnosis Date    BPH with urinary obstruction      CAD (coronary artery disease)     Elevated cholesterol     Elevated PSA     Hypertension     Sleep apnea     CPAP        Past Surgical History:   Procedure Laterality Date    CAROTID STENT      EYE SURGERY      HEMORRHOID SURGERY      INSERTION OF TUNNELED CENTRAL VENOUS CATHETER (CVC) WITH SUBCUTANEOUS PORT Right 10/21/2024    Procedure: INSERTION, PORT-A-CATH (Do 1st Pt. has Oncologist appt. after);  Surgeon: Moy Ruvalcaba MD;  Location: [facility];  Service: General;  Laterality: Right;  Right internal jugular    JOINT REPLACEMENT      KNEE SURGERY      right knee replaced     PROSTATE BIOPSY      PROSTATE SURGERY          Social History     Tobacco Use    Smoking status: Former     Current packs/day: 0.00     Average packs/day: 1 pack/day for 30.0 years (30.0 ttl pk-yrs)     Types: Cigarettes     Start date: 11/10/1965     Quit date: 11/15/1995     Years since quittin.3     Passive exposure: Past    Smokeless tobacco: Never   Substance and Sexual Activity    Alcohol use: Yes     Alcohol/week: 8.0 standard drinks of alcohol     Types: 8 Glasses of wine per week     Comment: socially    Drug use: Never    Sexual activity: Not Currently     Partners: Female     Birth control/protection: None        Current Outpatient Medications   Medication Instructions    amLODIPine (NORVASC) 5 mg, Oral, Nightly    aspirin (ECOTRIN) 81 mg, Every morning    atorvastatin (LIPITOR) 80 mg, Every morning    cyanocobalamin (VITAMIN B-12) 1,000 mcg, Every morning    ezetimibe (ZETIA) 10 mg, Every morning    LIDOcaine-prilocaine (EMLA) cream Topical (Top), As needed (PRN)    losartan (COZAAR) 25 mg, 2 times daily    multivitamin-minerals-lutein (MULTIVITAMIN 50 PLUS) Tab 1 tablet, Every morning    ondansetron (ZOFRAN) 8 mg, Oral, Every 8 hours PRN    pantoprazole (PROTONIX) 40 mg, Every morning    prochlorperazine (COMPAZINE) 10 mg, Oral, Every 8 hours PRN    traMADoL (ULTRAM) 50 mg tablet 1 tablet, Daily PRN    traZODone  "(DESYREL) 100 mg, Nightly       Review of patient's allergies indicates:  No Known Allergies     Patient Care Team:  Kady Miller MD as PCP - General (Internal Medicine)     Subjective:     Review of Systems   Constitutional:  Negative for activity change, appetite change, chills, diaphoresis, fatigue and fever.   HENT:  Negative for congestion, ear pain, tinnitus and trouble swallowing.    Eyes:  Negative for photophobia and pain.   Respiratory:  Negative for apnea, cough, choking, chest tightness, shortness of breath and stridor.    Cardiovascular:  Negative for chest pain, palpitations and leg swelling.   Endocrine: Negative for cold intolerance and heat intolerance.   Genitourinary:  Negative for difficulty urinating, dysuria, enuresis, flank pain, frequency and hematuria.   Musculoskeletal:  Negative for arthralgias, back pain and gait problem.   Neurological:  Negative for dizziness, seizures, syncope, facial asymmetry, speech difficulty, weakness, light-headedness, numbness and headaches.   Psychiatric/Behavioral:  Negative for agitation, behavioral problems, confusion and decreased concentration.    All other systems reviewed and are negative.      12 point review of systems conducted, negative except as stated in the history of present illness. See HPI for details.    Objective:     Visit Vitals  BP (!) 146/83 (BP Location: Left arm, Patient Position: Sitting)   Pulse 75   Ht 5' 9" (1.753 m)   Wt 83 kg (183 lb)   SpO2 97%   BMI 27.02 kg/m²       Physical Exam  Constitutional:       General: He is not in acute distress.     Appearance: Normal appearance. He is well-developed and normal weight. He is not ill-appearing, toxic-appearing or diaphoretic.   HENT:      Head: Normocephalic and atraumatic.      Right Ear: Hearing and external ear normal.      Left Ear: Hearing and external ear normal.      Nose: No congestion or rhinorrhea.      Mouth/Throat:      Mouth: Mucous membranes are moist.      " Pharynx: Oropharynx is clear. No oropharyngeal exudate.   Eyes:      General: Lids are normal. Gaze aligned appropriately. No scleral icterus.     Extraocular Movements: Extraocular movements intact.      Right eye: Normal extraocular motion and no nystagmus.      Left eye: Normal extraocular motion and no nystagmus.      Conjunctiva/sclera: Conjunctivae normal.      Right eye: Right conjunctiva is not injected.      Left eye: Left conjunctiva is not injected.      Pupils: Pupils are equal, round, and reactive to light.   Neck:      Vascular: No carotid bruit or JVD.      Trachea: Trachea and phonation normal.   Cardiovascular:      Rate and Rhythm: Normal rate and regular rhythm.      Pulses: Normal pulses.      Heart sounds: Normal heart sounds. No murmur heard.     No friction rub. No gallop.   Pulmonary:      Effort: Pulmonary effort is normal. No tachypnea, accessory muscle usage, respiratory distress or retractions.      Breath sounds: Normal breath sounds and air entry. No stridor or decreased air movement. No wheezing or rhonchi.   Chest:      Chest wall: No mass, deformity, swelling, tenderness or crepitus.   Abdominal:      General: Abdomen is flat. Bowel sounds are normal. There is no distension. There are no signs of injury.      Palpations: Abdomen is soft. There is no shifting dullness, fluid wave, hepatomegaly, splenomegaly or mass.      Tenderness: There is no abdominal tenderness. There is no guarding or rebound.      Hernia: No hernia is present.   Musculoskeletal:         General: No swelling or tenderness.      Cervical back: Normal range of motion and neck supple. No rigidity, tenderness or crepitus.   Lymphadenopathy:      Head:      Right side of head: No submental, submandibular or occipital adenopathy.      Left side of head: No submental, submandibular or occipital adenopathy.      Cervical: No cervical adenopathy.      Right cervical: No superficial or posterior cervical adenopathy.      Left cervical: No superficial or posterior cervical adenopathy.      Upper Body:      Right upper body: No supraclavicular or axillary adenopathy.      Left upper body: No supraclavicular or axillary adenopathy.      Lower Body: No right inguinal adenopathy. No left inguinal adenopathy.   Skin:     General: Skin is warm.      Capillary Refill: Capillary refill takes less than 2 seconds.      Coloration: Skin is not cyanotic, jaundiced, mottled or pale.      Findings: No bruising, ecchymosis, erythema, lesion, petechiae or rash.      Nails: There is no clubbing.   Neurological:      General: No focal deficit present.      Mental Status: He is alert and oriented to person, place, and time.      Cranial Nerves: No cranial nerve deficit or facial asymmetry.      Sensory: No sensory deficit.      Motor: No weakness, tremor, atrophy or abnormal muscle tone.      Coordination: Coordination normal.      Gait: Gait normal.      Deep Tendon Reflexes: Reflexes normal.   Psychiatric:         Attention and Perception: Attention and perception normal.         Mood and Affect: Mood normal. Mood is not anxious or depressed. Affect is not blunt or flat.         Speech: Speech normal. Speech is not slurred.         Behavior: Behavior normal. Behavior is cooperative.         Assessment/Plan:     Distal esophageal adenocarcinoma, status post neoadjuvant chemoradiation.  PET-CT scan shows localized disease without evidence of distant metastasis, repeat endoscopy shows persistent disease.    Diagnosis, staging, prognosis and treatment guidelines for esophageal cancer were discussed with the patient in detail, all questions were addressed.Using visual aids and drawings, I described the anatomy and potential surgical procedures.    Preoperative cardiac evaluation    Schedule laparoscopic/robotic transhiatal esophagectomy, placement of feeding jejunostomy tube    The risks and benefits of the procedure were explained in detail using layman  terms, including the pros and cons of any implant that may be used, all questions were addressed, the patient gives consent to proceed          Gabe Talbot MD  Surgical Oncology  Complex General, Gastrointestinal and Hepatobiliary Surgery

## 2025-03-05 ENCOUNTER — HOSPITAL ENCOUNTER (OUTPATIENT)
Dept: RADIOLOGY | Facility: HOSPITAL | Age: 80
Discharge: HOME | End: 2025-03-05
Attending: SURGERY
Payer: OTHER GOVERNMENT

## 2025-03-05 ENCOUNTER — OFFICE VISIT (OUTPATIENT)
Dept: SURGICAL ONCOLOGY | Facility: CLINIC | Age: 80
End: 2025-03-05
Payer: OTHER GOVERNMENT

## 2025-03-05 VITALS
WEIGHT: 183 LBS | SYSTOLIC BLOOD PRESSURE: 146 MMHG | HEART RATE: 75 BPM | OXYGEN SATURATION: 97 % | BODY MASS INDEX: 27.11 KG/M2 | HEIGHT: 69 IN | DIASTOLIC BLOOD PRESSURE: 83 MMHG

## 2025-03-05 DIAGNOSIS — D00.1 CARCINOMA IN SITU OF ESOPHAGUS: ICD-10-CM

## 2025-03-05 DIAGNOSIS — C15.9 ESOPHAGEAL ADENOCARCINOMA: ICD-10-CM

## 2025-03-05 DIAGNOSIS — D00.1 CARCINOMA IN SITU OF ESOPHAGUS: Primary | ICD-10-CM

## 2025-03-05 DIAGNOSIS — C16.0 GE JUNCTION CARCINOMA: Primary | ICD-10-CM

## 2025-03-05 PROCEDURE — G2211 COMPLEX E/M VISIT ADD ON: HCPCS | Mod: S$PBB,,, | Performed by: SURGERY

## 2025-03-05 PROCEDURE — 99205 OFFICE O/P NEW HI 60 MIN: CPT | Mod: S$PBB,,, | Performed by: SURGERY

## 2025-03-05 PROCEDURE — 71046 X-RAY EXAM CHEST 2 VIEWS: CPT | Mod: TC

## 2025-03-05 PROCEDURE — 99999 PR PBB SHADOW E&M-EST. PATIENT-LVL IV: CPT | Mod: PBBFAC,,, | Performed by: SURGERY

## 2025-03-05 PROCEDURE — 99214 OFFICE O/P EST MOD 30 MIN: CPT | Mod: PBBFAC | Performed by: SURGERY

## 2025-03-05 RX ORDER — SODIUM CHLORIDE 9 MG/ML
INJECTION, SOLUTION INTRAVENOUS CONTINUOUS
OUTPATIENT
Start: 2025-03-05

## 2025-03-05 RX ORDER — METRONIDAZOLE 500 MG/100ML
500 INJECTION, SOLUTION INTRAVENOUS
OUTPATIENT
Start: 2025-03-05

## 2025-03-05 RX ORDER — MUPIROCIN 20 MG/G
OINTMENT TOPICAL
OUTPATIENT
Start: 2025-03-05

## 2025-03-05 RX ORDER — ENOXAPARIN SODIUM 100 MG/ML
30 INJECTION SUBCUTANEOUS ONCE
OUTPATIENT
Start: 2025-03-17

## 2025-03-05 RX ORDER — ALVIMOPAN 12 MG/1
12 CAPSULE ORAL ONCE
OUTPATIENT
Start: 2025-03-17 | End: 2025-03-23

## 2025-03-05 RX ORDER — HYDROCODONE BITARTRATE AND ACETAMINOPHEN 500; 5 MG/1; MG/1
TABLET ORAL
OUTPATIENT
Start: 2025-03-05

## 2025-03-12 ENCOUNTER — ANESTHESIA EVENT (OUTPATIENT)
Dept: SURGERY | Facility: HOSPITAL | Age: 80
End: 2025-03-12
Payer: OTHER GOVERNMENT

## 2025-03-14 ENCOUNTER — DOCUMENTATION ONLY (OUTPATIENT)
Dept: SURGICAL ONCOLOGY | Facility: CLINIC | Age: 80
End: 2025-03-14
Payer: OTHER GOVERNMENT

## 2025-03-14 NOTE — PROGRESS NOTES
Spoke with Marina at [facility], patient needs stresstest, and then cardiac clearance. I notified Dr Talbot, and we will postpone surgery until Monday 3/31/25. I notified Sabrina and the pt of new surgery date.

## 2025-03-17 ENCOUNTER — ANESTHESIA (OUTPATIENT)
Dept: SURGERY | Facility: HOSPITAL | Age: 80
End: 2025-03-17
Payer: OTHER GOVERNMENT

## 2025-03-25 NOTE — CLINICAL REVIEW
labs/EKG/CXR reviewed. cardiology notes utd. Stress noted. CRA provided. chart review complete. ccv

## 2025-03-28 NOTE — PRE-PROCEDURE INSTRUCTIONS
"[facility]: Outpatient Surgery  Preprocedure Check-In Instructions     Your arrival time for your surgery or procedure is _0500am_____.      Expectations: "Because of inconsistent procedure completion times, an unexpected wait may occur. The Physicians would like you to be here to prepare in the event they run ahead of time. We will make you as comfortable as possible and keep you informed. We apologize in advance if this happens."    You will arrive at [facility], [address], [place].  Enter through the [place] entrance next to the Emergency Room, and come to the 6th floor to the Outpatient Surgery Department.     Visitory Policy:  You are allowed 2 adult visitors to be with you in the hospital. All hospital visitors should be in good current health.  No small children. No switching out of visitors while you are waiting to go to surgery, and while you are in surgery.      What to Bring:  Please have your ID, insurance cards, and all home medication bottles with you at check in.  Bring your CPAP machine if one is used at home.     Fasting:  Nothing to eat after midnight the night before your procedure. This includes no gum and/or tobacco products. You may have clear liquids limited to only: WATER, GATORADE, POWERADE and children can also have PEDIALYTE AND/OR APPLE JUICE , up until 2 hours before your arrival time.   Follow your doctor's instructions for taking any medications on the morning of your procedure.  If no instructions for taking medications were given, do not take any medications but bring your medications in their bottles to your procedure check in.     Follow your doctor's preoperative instructions regarding skin prep, bowel prep, bathing, or showering prior to your procedure.  If any special soaps were provided to you, please use according to your doctor's instructions. If no instructions were given from your doctor, take a good bath or shower with " antibacterial soap the night before and the morning of your procedure.  On the morning of procedure, wear loose, comfortable clothing.  No lotions, makeup, perfumes, colognes, deodorant, or jewelry to your procedure.  Removable items (glasses, contact lenses, dentures, retainers, hearing aids) need to be removed for your procedure.  Bring your storage containers for these items if you wear them.     Artificial nails, body jewelry, eyelash extensions, and/or hair extensions with metal clips are not allowed during your surgery.  If you currently wear any of these items, please arrange for them to be removed prior to your arrival to the hospital.     Outpatient or Same Day Surgeries:  Any patients receiving sedation/anesthesia are advised not to drive for 24 hours after their procedure.  We do not allow patients to drive themselves home after discharge.  If you are going home after your procedure, please have someone available to drive you home from the hospital.        You may call the Outpatient Surgery Department at [phone] with any questions or concerns.  We are looking forward to meeting you and taking great care of you for your procedure.  Thank you for choosing [facility] for your surgical needs

## 2025-03-31 ENCOUNTER — HOSPITAL ENCOUNTER (INPATIENT)
Facility: HOSPITAL | Age: 80
LOS: 7 days | Discharge: HOME | DRG: 327 | End: 2025-04-07
Attending: SURGERY | Admitting: SURGERY
Payer: OTHER GOVERNMENT

## 2025-03-31 DIAGNOSIS — E44.0 MODERATE MALNUTRITION: Primary | ICD-10-CM

## 2025-03-31 DIAGNOSIS — R00.0 TACHYCARDIA: ICD-10-CM

## 2025-03-31 DIAGNOSIS — C15.9 ESOPHAGEAL ADENOCARCINOMA: ICD-10-CM

## 2025-03-31 DIAGNOSIS — I82.409 DVT (DEEP VENOUS THROMBOSIS): ICD-10-CM

## 2025-03-31 DIAGNOSIS — I48.91 ATRIAL FIBRILLATION: ICD-10-CM

## 2025-03-31 DIAGNOSIS — C16.0 GE JUNCTION CARCINOMA: ICD-10-CM

## 2025-03-31 LAB
ABORH RETYPE: NORMAL
APTT PPP: 27 SECONDS (ref 23.2–33.7)
BASOPHILS # BLD AUTO: 0.01 X10(3)/MCL
BASOPHILS NFR BLD AUTO: 0.2 %
EOSINOPHIL # BLD AUTO: 0.02 X10(3)/MCL (ref 0–0.9)
EOSINOPHIL NFR BLD AUTO: 0.4 %
ERYTHROCYTE [DISTWIDTH] IN BLOOD BY AUTOMATED COUNT: 12.1 % (ref 11.5–17)
GROUP & RH: NORMAL
HCT VFR BLD AUTO: 37.7 % (ref 42–52)
HGB BLD-MCNC: 12 G/DL (ref 14–18)
IMM GRANULOCYTES # BLD AUTO: 0.01 X10(3)/MCL (ref 0–0.04)
IMM GRANULOCYTES NFR BLD AUTO: 0.2 %
INDIRECT COOMBS: NORMAL
INR PPP: 1.3
LYMPHOCYTES # BLD AUTO: 0.32 X10(3)/MCL (ref 0.6–4.6)
LYMPHOCYTES NFR BLD AUTO: 6.7 %
MCH RBC QN AUTO: 32.2 PG (ref 27–31)
MCHC RBC AUTO-ENTMCNC: 31.8 G/DL (ref 33–36)
MCV RBC AUTO: 101.1 FL (ref 80–94)
MONOCYTES # BLD AUTO: 0.24 X10(3)/MCL (ref 0.1–1.3)
MONOCYTES NFR BLD AUTO: 5 %
NEUTROPHILS # BLD AUTO: 4.21 X10(3)/MCL (ref 2.1–9.2)
NEUTROPHILS NFR BLD AUTO: 87.5 %
NRBC BLD AUTO-RTO: 0 %
PLATELET # BLD AUTO: 98 X10(3)/MCL (ref 130–400)
PMV BLD AUTO: 9.8 FL (ref 7.4–10.4)
PROTHROMBIN TIME: 15.5 SECONDS (ref 12.5–14.5)
RBC # BLD AUTO: 3.73 X10(6)/MCL (ref 4.7–6.1)
SPECIMEN OUTDATE: NORMAL
WBC # BLD AUTO: 4.81 X10(3)/MCL (ref 4.5–11.5)

## 2025-03-31 PROCEDURE — 0DJ04ZZ INSPECTION OF UPPER INTESTINAL TRACT, PERCUTANEOUS ENDOSCOPIC APPROACH: ICD-10-PCS | Performed by: SURGERY

## 2025-03-31 PROCEDURE — 43659 UNLISTED LAPS PX STOMACH: CPT | Mod: AS,,, | Performed by: NURSE PRACTITIONER

## 2025-03-31 PROCEDURE — 36415 COLL VENOUS BLD VENIPUNCTURE: CPT | Performed by: SURGERY

## 2025-03-31 PROCEDURE — 86850 RBC ANTIBODY SCREEN: CPT | Performed by: SURGERY

## 2025-03-31 PROCEDURE — 27201423 OPTIME MED/SURG SUP & DEVICES STERILE SUPPLY: Performed by: SURGERY

## 2025-03-31 PROCEDURE — D9220A PRA ANESTHESIA: Mod: ANES,,, | Performed by: ANESTHESIOLOGY

## 2025-03-31 PROCEDURE — 0DXU4ZW TRANSFER OMENTUM TO ABDOMINAL REGION, PERCUTANEOUS ENDOSCOPIC APPROACH: ICD-10-PCS | Performed by: SURGERY

## 2025-03-31 PROCEDURE — 0DHA3UZ INSERTION OF FEEDING DEVICE INTO JEJUNUM, PERCUTANEOUS APPROACH: ICD-10-PCS | Performed by: SURGERY

## 2025-03-31 PROCEDURE — 25000003 PHARM REV CODE 250: Performed by: SURGERY

## 2025-03-31 PROCEDURE — 63600175 PHARM REV CODE 636 W HCPCS

## 2025-03-31 PROCEDURE — P9047 ALBUMIN (HUMAN), 25%, 50ML: HCPCS

## 2025-03-31 PROCEDURE — 37000009 HC ANESTHESIA EA ADD 15 MINS: Performed by: SURGERY

## 2025-03-31 PROCEDURE — 0DT34ZZ RESECTION OF LOWER ESOPHAGUS, PERCUTANEOUS ENDOSCOPIC APPROACH: ICD-10-PCS | Performed by: SURGERY

## 2025-03-31 PROCEDURE — 25000003 PHARM REV CODE 250

## 2025-03-31 PROCEDURE — 63600175 PHARM REV CODE 636 W HCPCS: Performed by: ANESTHESIOLOGY

## 2025-03-31 PROCEDURE — D9220A PRA ANESTHESIA: Mod: CRNA,,,

## 2025-03-31 PROCEDURE — 36000713 HC OR TIME LEV V EA ADD 15 MIN: Performed by: SURGERY

## 2025-03-31 PROCEDURE — 44015 INSERT NEEDLE CATH BOWEL: CPT | Mod: ,,, | Performed by: SURGERY

## 2025-03-31 PROCEDURE — 86923 COMPATIBILITY TEST ELECTRIC: CPT | Performed by: SURGERY

## 2025-03-31 PROCEDURE — 85025 COMPLETE CBC W/AUTO DIFF WBC: CPT | Performed by: NURSE PRACTITIONER

## 2025-03-31 PROCEDURE — 63600175 PHARM REV CODE 636 W HCPCS: Performed by: SURGERY

## 2025-03-31 PROCEDURE — 36620 INSERTION CATHETER ARTERY: CPT | Performed by: ANESTHESIOLOGY

## 2025-03-31 PROCEDURE — 71000015 HC POSTOP RECOV 1ST HR: Performed by: SURGERY

## 2025-03-31 PROCEDURE — 0D874ZZ DIVISION OF STOMACH, PYLORUS, PERCUTANEOUS ENDOSCOPIC APPROACH: ICD-10-PCS | Performed by: SURGERY

## 2025-03-31 PROCEDURE — 36000712 HC OR TIME LEV V 1ST 15 MIN: Performed by: SURGERY

## 2025-03-31 PROCEDURE — 37000008 HC ANESTHESIA 1ST 15 MINUTES: Performed by: SURGERY

## 2025-03-31 PROCEDURE — 43286 ESPHG TOT W/LAPS MOBLJ: CPT | Mod: ,,, | Performed by: SURGERY

## 2025-03-31 PROCEDURE — A4216 STERILE WATER/SALINE, 10 ML: HCPCS | Performed by: SURGERY

## 2025-03-31 PROCEDURE — 43659 UNLISTED LAPS PX STOMACH: CPT | Mod: ,,, | Performed by: SURGERY

## 2025-03-31 PROCEDURE — 85610 PROTHROMBIN TIME: CPT | Performed by: NURSE PRACTITIONER

## 2025-03-31 PROCEDURE — 11000001 HC ACUTE MED/SURG PRIVATE ROOM

## 2025-03-31 PROCEDURE — 71000016 HC POSTOP RECOV ADDL HR: Performed by: SURGERY

## 2025-03-31 PROCEDURE — 71000033 HC RECOVERY, INTIAL HOUR: Performed by: SURGERY

## 2025-03-31 PROCEDURE — 71000039 HC RECOVERY, EACH ADD'L HOUR: Performed by: SURGERY

## 2025-03-31 PROCEDURE — 43286 ESPHG TOT W/LAPS MOBLJ: CPT | Mod: AS,,, | Performed by: NURSE PRACTITIONER

## 2025-03-31 PROCEDURE — 63600175 PHARM REV CODE 636 W HCPCS: Mod: JZ,TB | Performed by: SURGERY

## 2025-03-31 PROCEDURE — C1729 CATH, DRAINAGE: HCPCS | Performed by: SURGERY

## 2025-03-31 PROCEDURE — 63600175 PHARM REV CODE 636 W HCPCS: Performed by: NURSE PRACTITIONER

## 2025-03-31 PROCEDURE — 27000221 HC OXYGEN, UP TO 24 HOURS

## 2025-03-31 PROCEDURE — 44015 INSERT NEEDLE CATH BOWEL: CPT | Mod: AS,,, | Performed by: NURSE PRACTITIONER

## 2025-03-31 PROCEDURE — 85730 THROMBOPLASTIN TIME PARTIAL: CPT | Performed by: NURSE PRACTITIONER

## 2025-03-31 PROCEDURE — 63600175 PHARM REV CODE 636 W HCPCS: Mod: JZ,TB

## 2025-03-31 PROCEDURE — 0DX64Z5 TRANSFER STOMACH TO ESOPHAGUS, PERCUTANEOUS ENDOSCOPIC APPROACH: ICD-10-PCS | Performed by: SURGERY

## 2025-03-31 PROCEDURE — 07BB4ZZ EXCISION OF MESENTERIC LYMPHATIC, PERCUTANEOUS ENDOSCOPIC APPROACH: ICD-10-PCS | Performed by: SURGERY

## 2025-03-31 RX ORDER — ROCURONIUM BROMIDE 10 MG/ML
INJECTION, SOLUTION INTRAVENOUS
Status: DISCONTINUED | OUTPATIENT
Start: 2025-03-31 | End: 2025-03-31

## 2025-03-31 RX ORDER — SODIUM CHLORIDE 9 MG/ML
INJECTION, SOLUTION INTRAVENOUS CONTINUOUS
Status: DISCONTINUED | OUTPATIENT
Start: 2025-03-31 | End: 2025-04-01

## 2025-03-31 RX ORDER — HYDRALAZINE HYDROCHLORIDE 20 MG/ML
10 INJECTION INTRAMUSCULAR; INTRAVENOUS EVERY 4 HOURS PRN
Status: DISCONTINUED | OUTPATIENT
Start: 2025-03-31 | End: 2025-04-07 | Stop reason: HOSPADM

## 2025-03-31 RX ORDER — GLUCAGON 1 MG
1 KIT INJECTION
Status: DISCONTINUED | OUTPATIENT
Start: 2025-03-31 | End: 2025-03-31 | Stop reason: HOSPADM

## 2025-03-31 RX ORDER — HYDROMORPHONE HYDROCHLORIDE 2 MG/ML
0.4 INJECTION, SOLUTION INTRAMUSCULAR; INTRAVENOUS; SUBCUTANEOUS EVERY 5 MIN PRN
Status: DISCONTINUED | OUTPATIENT
Start: 2025-03-31 | End: 2025-03-31 | Stop reason: HOSPADM

## 2025-03-31 RX ORDER — CEFTRIAXONE 1 G/1
2 INJECTION, POWDER, FOR SOLUTION INTRAMUSCULAR; INTRAVENOUS
Status: COMPLETED | OUTPATIENT
Start: 2025-03-31 | End: 2025-03-31

## 2025-03-31 RX ORDER — LABETALOL HYDROCHLORIDE 5 MG/ML
5 INJECTION, SOLUTION INTRAVENOUS ONCE
Status: COMPLETED | OUTPATIENT
Start: 2025-03-31 | End: 2025-03-31

## 2025-03-31 RX ORDER — ONDANSETRON HYDROCHLORIDE 2 MG/ML
4 INJECTION, SOLUTION INTRAVENOUS DAILY PRN
Status: DISCONTINUED | OUTPATIENT
Start: 2025-03-31 | End: 2025-03-31 | Stop reason: HOSPADM

## 2025-03-31 RX ORDER — SODIUM CHLORIDE, SODIUM LACTATE, POTASSIUM CHLORIDE, CALCIUM CHLORIDE 600; 310; 30; 20 MG/100ML; MG/100ML; MG/100ML; MG/100ML
INJECTION, SOLUTION INTRAVENOUS CONTINUOUS
Status: DISCONTINUED | OUTPATIENT
Start: 2025-03-31 | End: 2025-04-04

## 2025-03-31 RX ORDER — FENTANYL CITRATE 50 UG/ML
INJECTION, SOLUTION INTRAMUSCULAR; INTRAVENOUS
Status: DISCONTINUED | OUTPATIENT
Start: 2025-03-31 | End: 2025-03-31

## 2025-03-31 RX ORDER — DIPHENHYDRAMINE HYDROCHLORIDE 50 MG/ML
12.5 INJECTION, SOLUTION INTRAMUSCULAR; INTRAVENOUS EVERY 4 HOURS PRN
Status: DISCONTINUED | OUTPATIENT
Start: 2025-03-31 | End: 2025-04-07 | Stop reason: HOSPADM

## 2025-03-31 RX ORDER — ALVIMOPAN 12 MG/1
12 CAPSULE ORAL ONCE
Status: COMPLETED | OUTPATIENT
Start: 2025-03-31 | End: 2025-03-31

## 2025-03-31 RX ORDER — LIDOCAINE HYDROCHLORIDE 20 MG/ML
INJECTION INTRAVENOUS
Status: DISCONTINUED | OUTPATIENT
Start: 2025-03-31 | End: 2025-03-31

## 2025-03-31 RX ORDER — BUPIVACAINE HYDROCHLORIDE 5 MG/ML
INJECTION, SOLUTION EPIDURAL; INTRACAUDAL; PERINEURAL
Status: DISCONTINUED | OUTPATIENT
Start: 2025-03-31 | End: 2025-03-31 | Stop reason: HOSPADM

## 2025-03-31 RX ORDER — HYDROMORPHONE HYDROCHLORIDE 2 MG/ML
INJECTION, SOLUTION INTRAMUSCULAR; INTRAVENOUS; SUBCUTANEOUS
Status: DISCONTINUED | OUTPATIENT
Start: 2025-03-31 | End: 2025-03-31

## 2025-03-31 RX ORDER — ACETAMINOPHEN 10 MG/ML
INJECTION, SOLUTION INTRAVENOUS
Status: DISCONTINUED | OUTPATIENT
Start: 2025-03-31 | End: 2025-03-31

## 2025-03-31 RX ORDER — INDOCYANINE GREEN AND WATER 25 MG
KIT INJECTION
Status: DISCONTINUED | OUTPATIENT
Start: 2025-03-31 | End: 2025-03-31

## 2025-03-31 RX ORDER — DEXMEDETOMIDINE HYDROCHLORIDE 100 UG/ML
INJECTION, SOLUTION INTRAVENOUS
Status: DISCONTINUED | OUTPATIENT
Start: 2025-03-31 | End: 2025-03-31

## 2025-03-31 RX ORDER — ACETAMINOPHEN 10 MG/ML
1000 INJECTION, SOLUTION INTRAVENOUS EVERY 8 HOURS
Status: DISPENSED | OUTPATIENT
Start: 2025-03-31 | End: 2025-04-01

## 2025-03-31 RX ORDER — GLYCOPYRROLATE 0.2 MG/ML
INJECTION INTRAMUSCULAR; INTRAVENOUS
Status: DISCONTINUED | OUTPATIENT
Start: 2025-03-31 | End: 2025-03-31

## 2025-03-31 RX ORDER — ONDANSETRON HYDROCHLORIDE 2 MG/ML
4 INJECTION, SOLUTION INTRAVENOUS EVERY 12 HOURS PRN
Status: DISCONTINUED | OUTPATIENT
Start: 2025-03-31 | End: 2025-04-07 | Stop reason: HOSPADM

## 2025-03-31 RX ORDER — SODIUM CHLORIDE 0.9 % (FLUSH) 0.9 %
SYRINGE (ML) INJECTION
Status: DISCONTINUED | OUTPATIENT
Start: 2025-03-31 | End: 2025-03-31 | Stop reason: HOSPADM

## 2025-03-31 RX ORDER — ALBUMIN HUMAN 250 G/1000ML
SOLUTION INTRAVENOUS
Status: DISCONTINUED | OUTPATIENT
Start: 2025-03-31 | End: 2025-03-31

## 2025-03-31 RX ORDER — PROCHLORPERAZINE EDISYLATE 5 MG/ML
5 INJECTION INTRAMUSCULAR; INTRAVENOUS EVERY 6 HOURS PRN
Status: DISCONTINUED | OUTPATIENT
Start: 2025-03-31 | End: 2025-04-07 | Stop reason: HOSPADM

## 2025-03-31 RX ORDER — PHENYLEPHRINE HYDROCHLORIDE 10 MG/ML
INJECTION INTRAVENOUS
Status: DISCONTINUED | OUTPATIENT
Start: 2025-03-31 | End: 2025-03-31

## 2025-03-31 RX ORDER — PROPOFOL 10 MG/ML
VIAL (ML) INTRAVENOUS
Status: DISCONTINUED | OUTPATIENT
Start: 2025-03-31 | End: 2025-03-31

## 2025-03-31 RX ORDER — ONDANSETRON HYDROCHLORIDE 2 MG/ML
INJECTION, SOLUTION INTRAVENOUS
Status: DISCONTINUED | OUTPATIENT
Start: 2025-03-31 | End: 2025-03-31

## 2025-03-31 RX ORDER — NALOXONE HCL 0.4 MG/ML
0.02 VIAL (ML) INJECTION
Status: DISCONTINUED | OUTPATIENT
Start: 2025-03-31 | End: 2025-04-07 | Stop reason: HOSPADM

## 2025-03-31 RX ORDER — ENOXAPARIN SODIUM 100 MG/ML
30 INJECTION SUBCUTANEOUS ONCE
Status: COMPLETED | OUTPATIENT
Start: 2025-03-31 | End: 2025-03-31

## 2025-03-31 RX ORDER — PROCHLORPERAZINE EDISYLATE 5 MG/ML
5 INJECTION INTRAMUSCULAR; INTRAVENOUS EVERY 30 MIN PRN
Status: DISCONTINUED | OUTPATIENT
Start: 2025-03-31 | End: 2025-03-31 | Stop reason: HOSPADM

## 2025-03-31 RX ORDER — ENOXAPARIN SODIUM 100 MG/ML
40 INJECTION SUBCUTANEOUS EVERY 24 HOURS
Status: DISCONTINUED | OUTPATIENT
Start: 2025-04-01 | End: 2025-04-07

## 2025-03-31 RX ORDER — LABETALOL HYDROCHLORIDE 5 MG/ML
INJECTION, SOLUTION INTRAVENOUS
Status: DISPENSED
Start: 2025-03-31 | End: 2025-04-01

## 2025-03-31 RX ORDER — DEXAMETHASONE SODIUM PHOSPHATE 4 MG/ML
INJECTION, SOLUTION INTRA-ARTICULAR; INTRALESIONAL; INTRAMUSCULAR; INTRAVENOUS; SOFT TISSUE
Status: DISCONTINUED | OUTPATIENT
Start: 2025-03-31 | End: 2025-03-31

## 2025-03-31 RX ORDER — MIDAZOLAM HYDROCHLORIDE 2 MG/2ML
INJECTION, SOLUTION INTRAMUSCULAR; INTRAVENOUS
Status: COMPLETED
Start: 2025-03-31 | End: 2025-03-31

## 2025-03-31 RX ORDER — CALCIUM CHLORIDE INJECTION 100 MG/ML
INJECTION, SOLUTION INTRAVENOUS
Status: DISCONTINUED | OUTPATIENT
Start: 2025-03-31 | End: 2025-03-31

## 2025-03-31 RX ORDER — ACETAMINOPHEN 500 MG
2000 TABLET ORAL DAILY
COMMUNITY

## 2025-03-31 RX ORDER — BUPIVACAINE 13.3 MG/ML
INJECTION, SUSPENSION, LIPOSOMAL INFILTRATION
Status: DISCONTINUED | OUTPATIENT
Start: 2025-03-31 | End: 2025-03-31 | Stop reason: HOSPADM

## 2025-03-31 RX ORDER — HYDROCODONE BITARTRATE AND ACETAMINOPHEN 500; 5 MG/1; MG/1
TABLET ORAL
Status: DISCONTINUED | OUTPATIENT
Start: 2025-03-31 | End: 2025-03-31 | Stop reason: HOSPADM

## 2025-03-31 RX ORDER — METRONIDAZOLE 500 MG/100ML
500 INJECTION, SOLUTION INTRAVENOUS
Status: COMPLETED | OUTPATIENT
Start: 2025-03-31 | End: 2025-03-31

## 2025-03-31 RX ORDER — MORPHINE SULFATE 4 MG/ML
2 INJECTION, SOLUTION INTRAMUSCULAR; INTRAVENOUS
Status: DISCONTINUED | OUTPATIENT
Start: 2025-03-31 | End: 2025-04-01

## 2025-03-31 RX ORDER — DIAZEPAM 10 MG/2ML
2 INJECTION INTRAMUSCULAR EVERY 4 HOURS PRN
Status: DISCONTINUED | OUTPATIENT
Start: 2025-03-31 | End: 2025-04-01

## 2025-03-31 RX ORDER — ONDANSETRON HYDROCHLORIDE 2 MG/ML
4 INJECTION, SOLUTION INTRAVENOUS ONCE AS NEEDED
Status: DISCONTINUED | OUTPATIENT
Start: 2025-03-31 | End: 2025-03-31

## 2025-03-31 RX ORDER — SODIUM CHLORIDE 0.9 % (FLUSH) 0.9 %
10 SYRINGE (ML) INJECTION
Status: DISCONTINUED | OUTPATIENT
Start: 2025-03-31 | End: 2025-03-31 | Stop reason: HOSPADM

## 2025-03-31 RX ORDER — MUPIROCIN 20 MG/G
OINTMENT TOPICAL
Status: DISCONTINUED | OUTPATIENT
Start: 2025-03-31 | End: 2025-03-31 | Stop reason: HOSPADM

## 2025-03-31 RX ORDER — MUPIROCIN 20 MG/G
OINTMENT TOPICAL 2 TIMES DAILY
Status: DISPENSED | OUTPATIENT
Start: 2025-03-31 | End: 2025-04-05

## 2025-03-31 RX ORDER — MIDAZOLAM HYDROCHLORIDE 1 MG/ML
INJECTION INTRAMUSCULAR; INTRAVENOUS
Status: DISCONTINUED | OUTPATIENT
Start: 2025-03-31 | End: 2025-03-31

## 2025-03-31 RX ADMIN — ROCURONIUM BROMIDE 10 MG: 10 SOLUTION INTRAVENOUS at 10:03

## 2025-03-31 RX ADMIN — CALCIUM CHLORIDE INJECTION 250 MG: 100 INJECTION, SOLUTION INTRAVENOUS at 12:03

## 2025-03-31 RX ADMIN — CALCIUM CHLORIDE INJECTION 250 MG: 100 INJECTION, SOLUTION INTRAVENOUS at 09:03

## 2025-03-31 RX ADMIN — PROPOFOL 150 MG: 10 INJECTION, EMULSION INTRAVENOUS at 07:03

## 2025-03-31 RX ADMIN — ONDANSETRON 4 MG: 2 INJECTION INTRAMUSCULAR; INTRAVENOUS at 12:03

## 2025-03-31 RX ADMIN — GLYCOPYRROLATE 0.2 MG: 0.2 INJECTION INTRAMUSCULAR; INTRAVENOUS at 07:03

## 2025-03-31 RX ADMIN — DEXAMETHASONE SODIUM PHOSPHATE 8 MG: 4 INJECTION, SOLUTION INTRA-ARTICULAR; INTRALESIONAL; INTRAMUSCULAR; INTRAVENOUS; SOFT TISSUE at 08:03

## 2025-03-31 RX ADMIN — PHENYLEPHRINE HYDROCHLORIDE 25 MCG/MIN: 10 INJECTION INTRAVENOUS at 08:03

## 2025-03-31 RX ADMIN — ALBUMIN (HUMAN) 100 ML: 12.5 SOLUTION INTRAVENOUS at 08:03

## 2025-03-31 RX ADMIN — CALCIUM CHLORIDE INJECTION 250 MG: 100 INJECTION, SOLUTION INTRAVENOUS at 11:03

## 2025-03-31 RX ADMIN — CEFTRIAXONE SODIUM 2 G: 1 INJECTION, POWDER, FOR SOLUTION INTRAMUSCULAR; INTRAVENOUS at 07:03

## 2025-03-31 RX ADMIN — ACETAMINOPHEN 1000 MG: 10 INJECTION, SOLUTION INTRAVENOUS at 12:03

## 2025-03-31 RX ADMIN — FENTANYL CITRATE 50 MCG: 50 INJECTION, SOLUTION INTRAMUSCULAR; INTRAVENOUS at 07:03

## 2025-03-31 RX ADMIN — MUPIROCIN: 20 OINTMENT TOPICAL at 05:03

## 2025-03-31 RX ADMIN — HYDROMORPHONE HYDROCHLORIDE 0.4 MG: 2 INJECTION, SOLUTION INTRAMUSCULAR; INTRAVENOUS; SUBCUTANEOUS at 01:03

## 2025-03-31 RX ADMIN — MIDAZOLAM HYDROCHLORIDE 1 MG: 1 INJECTION, SOLUTION INTRAMUSCULAR; INTRAVENOUS at 06:03

## 2025-03-31 RX ADMIN — ALVIMOPAN 12 MG: 12 CAPSULE ORAL at 05:03

## 2025-03-31 RX ADMIN — HYDROMORPHONE HYDROCHLORIDE 0.4 MG: 2 INJECTION, SOLUTION INTRAMUSCULAR; INTRAVENOUS; SUBCUTANEOUS at 12:03

## 2025-03-31 RX ADMIN — PHENYLEPHRINE HYDROCHLORIDE 100 MCG: 10 INJECTION INTRAVENOUS at 08:03

## 2025-03-31 RX ADMIN — ROCURONIUM BROMIDE 20 MG: 10 SOLUTION INTRAVENOUS at 08:03

## 2025-03-31 RX ADMIN — HYDROMORPHONE HYDROCHLORIDE 0.4 MG: 2 INJECTION, SOLUTION INTRAMUSCULAR; INTRAVENOUS; SUBCUTANEOUS at 02:03

## 2025-03-31 RX ADMIN — HYDROMORPHONE HYDROCHLORIDE 0.4 MG: 2 INJECTION, SOLUTION INTRAMUSCULAR; INTRAVENOUS; SUBCUTANEOUS at 10:03

## 2025-03-31 RX ADMIN — SUGAMMADEX 200 MG: 100 INJECTION, SOLUTION INTRAVENOUS at 12:03

## 2025-03-31 RX ADMIN — SODIUM CHLORIDE, SODIUM GLUCONATE, SODIUM ACETATE, POTASSIUM CHLORIDE AND MAGNESIUM CHLORIDE: 526; 502; 368; 37; 30 INJECTION, SOLUTION INTRAVENOUS at 07:03

## 2025-03-31 RX ADMIN — INDOCYANINE GREEN AND WATER 7.5 MG: KIT at 10:03

## 2025-03-31 RX ADMIN — DEXMEDETOMIDINE 4 MCG: 200 INJECTION, SOLUTION INTRAVENOUS at 11:03

## 2025-03-31 RX ADMIN — FENTANYL CITRATE 50 MCG: 50 INJECTION, SOLUTION INTRAMUSCULAR; INTRAVENOUS at 08:03

## 2025-03-31 RX ADMIN — DEXMEDETOMIDINE 4 MCG: 200 INJECTION, SOLUTION INTRAVENOUS at 08:03

## 2025-03-31 RX ADMIN — PHENYLEPHRINE HYDROCHLORIDE 100 MCG: 10 INJECTION INTRAVENOUS at 09:03

## 2025-03-31 RX ADMIN — LABETALOL HYDROCHLORIDE 5 MG: 5 INJECTION, SOLUTION INTRAVENOUS at 03:03

## 2025-03-31 RX ADMIN — DEXMEDETOMIDINE 4 MCG: 200 INJECTION, SOLUTION INTRAVENOUS at 10:03

## 2025-03-31 RX ADMIN — LIDOCAINE HYDROCHLORIDE 80 MG: 20 INJECTION INTRAVENOUS at 07:03

## 2025-03-31 RX ADMIN — DEXMEDETOMIDINE 4 MCG: 200 INJECTION, SOLUTION INTRAVENOUS at 12:03

## 2025-03-31 RX ADMIN — ROCURONIUM BROMIDE 20 MG: 10 SOLUTION INTRAVENOUS at 09:03

## 2025-03-31 RX ADMIN — ROCURONIUM BROMIDE 60 MG: 10 SOLUTION INTRAVENOUS at 07:03

## 2025-03-31 RX ADMIN — SODIUM CHLORIDE, POTASSIUM CHLORIDE, SODIUM LACTATE AND CALCIUM CHLORIDE: 600; 310; 30; 20 INJECTION, SOLUTION INTRAVENOUS at 04:03

## 2025-03-31 RX ADMIN — METRONIDAZOLE 500 MG: 5 INJECTION, SOLUTION INTRAVENOUS at 07:03

## 2025-03-31 RX ADMIN — MORPHINE SULFATE 2 MG: 4 INJECTION, SOLUTION INTRAMUSCULAR; INTRAVENOUS at 05:03

## 2025-03-31 RX ADMIN — ENOXAPARIN SODIUM 30 MG: 30 INJECTION SUBCUTANEOUS at 05:03

## 2025-03-31 RX ADMIN — DEXMEDETOMIDINE 4 MCG: 200 INJECTION, SOLUTION INTRAVENOUS at 09:03

## 2025-03-31 RX ADMIN — ACETAMINOPHEN 1000 MG: 10 INJECTION INTRAVENOUS at 09:03

## 2025-03-31 NOTE — OP NOTE
Date of Surgery:  03/31/2025    Surgeon:  Gabe Talbot MD    Assistant:  Rebecca SANCHEZ                                        Pre-op Diagnosis:  Distal esophageal adenocarcinoma, status post neoadjuvant therapy    Post-op Diagnosis:  Same    Procedure:    1. Laparoscopic/robotic transhiatal esophagectomy with gastric pull-through, cervical esophagogastric anastomosis  2. Placement of feeding jejunostomy tube  3. Pyloromyotomy   4. Diagnostic laparoscopy    Anesthesia:  GETA    EBL:  50 cc    Specimens:  Transhiatal esophagectomy    Findings:  No evidence of metastatic disease on initial diagnostic laparoscopic examination, therefore we proceeded with definitive surgical resection.  Complete gastric mobilization was performed with preservation of the right gastroepiploic pedicle and arcade, as well as the right gastric pedicle.  This was assisted with firefly visualization intraoperatively.  High ligation of the left gastric pedicle was performed.  Tubularization of the gastric conduit was also performed.      The gastric conduit was easily delivered into the left cervical incision, with meticulous attention paid to its orientation.  Firefly visualization revealed excellent perfusion to the intrathoracic gastric conduit.  Single layer hand-sewn esophagogastric anastomosis was performed without difficulty, the NG tube was passed across the anastomosis prior to completion.  Negative air-leak test.    Procedure in detail:  After informed consent was obtained the patient was brought the operating room and placed in the supine position.  General endotracheal anesthesia was administered without difficulty.  The patient was placed in split leg position with pressure points carefully padded.  The abdomen was prepped and draped in sterile fashion.  Under ultrasound guidance I performed bilateral transversus abdominis plane nerve blocks using liposomal bupivacaine.  Supraumbilical incision was made and an optical  trocar was used into the peritoneal cavity under direct vision.  Pneumoperitoneum was achieved without difficulty.  Additional ports were placed transversely under direct vision, and a self-retaining liver retractor was placed beneath the left lateral segment of the liver.  Initial diagnostic laparoscopy was performed visualizing all parietal and visceral peritoneal surfaces, and carefully examining the liver.  There was no evidence of metastatic disease that would preclude an aggressive surgical approach, therefore we proceeded with surgical resection.  The patient was placed in reverse Trendelenburg position and the robot was docked.  Firefly was used to identify and map out the gastroepiploic arcade as well as omental vessels.  Using this as a guide, an omental pedicle flap was used by dividing the omentum longitudinally parallel to the feeding vessels off of the gastroepiploic arcade.  This portion of the omentum was also detached from the transverse colon in the avascular plane using meticulous dissection creating an omental pedicle flap.  The gastrocolic ligament was then incised on either side of the omental flap towards the right and the left using the vessel sealer with meticulous preservation of the gastroepiploic arcade.  The right gastroepiploic pedicle was slightly mobilized.  The short gastric vessels were taken down using the vessel sealer.  The right gastric artery and vein were preserved.  Using the endoscopic needle catheter, 100 units of botulism toxin was injected in 4 quadrants of the pylorus.  The lesser omentum was incised and the left gastric artery and vein were dissected at their origins using meticulous dissection and divided using a vascular load stapler.  Beginning on the right side the edge of the right crura was dissected and the esophagus and GE junction were dissected circumferentially using gentle blunt dissection using the vessel sealer.  Dissection was then carried proximally into  the mediastinum with care to include lymph nodes EN bloc, esophageal vessels were divided at the aorta using the vessel sealer.  There was no evidence of entry into the pleura.  An upper midline extraction incision was then made and a wound protector was placed.  A left cervical incision was then made and dissection carried out anterior to the sternocleidomastoid.  The omohyoid muscle was divided in the thyrocervical fascia was identified and dissection carried out between the carotid sheath and the thyrocervical fascia posterior to the prevertebral space which was then developed using blunt dissection.  The esophagus was then dissected circumferentially using gentle retraction and sharp dissection with care to avoid the recurrent laryngeal nerve.  The esophagus was then divided using a TA stapler with a Penrose drain attached to the distal end, spatulation was performed.  The esophagus was then delivered into the abdomen and brought out through the extraction incision along with the stomach which was stretched out exteriorly on the anterior surface of the sternum and appeared to have more than adequate length up to the sternal notch.  The stomach was then placed on some traction cephalad and divided from the incisura/Crow's foot along the lesser curvature towards the angle of His using sequential firings of the YARA 75 mm blue load stapler.  This created a tubularized gastric conduit 4-5 cm in width.  The specimen was marked with suture and sent for histopathological evaluation.  The tip of the staple line near the fundus was then sutured to the Penrose drain passing through the mediastinum with 0 silk and gently delivered up into the neck incision using a pool/push technique maintaining the orientation of the conduit meticulously.  The staple line was noted to be on the right side in the neck.  Gastrotomy was then made at the fundus and single layer hand-sewn anastomosis was performed using interrupted 3-0 Vicryl  sutures circumferentially.  Prior to completing the anterior row the NG tube was passed across the anastomosis into the gastric conduit under direct vision.  A small drain was left and brought out through a separate incision below the sternal notch.  The wound was irrigated copiously and then closed loosely with interrupted 3-0 Vicryl sutures for the platysma and skin staples.  the abdomen was then inspected for hemostasis which appeared to be excellent. The ligament of Treitz was identified and the first mobile loop of jejunum was tacked up to the abdominal wall using 2-0 silk trans-fascial sutures. The needle was used to puncture the bowel and entry into the lumen was confirmed with air insuflation with a syringe. The wire was passed through the needle and noted to pass distally. The introducer/dilator was passed over the guidewire and a 14Fr CEDRIC jejunostomy tube was inserted using the peel-away introducer.  A Witzel tunnel was created using 3-0 silk Lembert sutures with care to avoid narrowing the lumen.  The balloon was inflated with 2cc of saline and snugged up to the abdominal wall. The suspension sutures were  tied down securing the insertion site to the posterior abdominal wall.  The extraction site was closed using running looped PDS suture The liver retractor was removed.  Ports were removed, pneumoperitoneum was relieved, port sites were closed with absorbable suture and sterile dressings were applied.    Significant surgical tasks conducted by the assistant:  The skilled assistance of the nurse practitioner DANIEL Sanderson was necessary for the successful completion of this case.  She was essential for the proper positioning of the patient, manipulation of instruments, proper exposure, manipulation of tissue, and wound closure        Gabe Talbot MD  Surgical Oncology  [facility], Gastrointestinal and Hepatobiliary Surgery

## 2025-03-31 NOTE — ANESTHESIA PREPROCEDURE EVALUATION
03/31/2025  Marcelino Al is a 79 y.o., male.      Recent PET no sig isch changes EF 70%    Pre-op Assessment    I have reviewed the Patient Summary Reports.     I have reviewed the Nursing Notes. I have reviewed the NPO Status.   I have reviewed the Medications.     Review of Systems  Cardiovascular:     Hypertension   CAD                    Coronary Artery Disease:                            Hypertension         Pulmonary:        Sleep Apnea     Obstructive Sleep Apnea (KORY).               Physical Exam  General: Well nourished, Cooperative, Alert and Oriented    Airway:  Mallampati: II   Mouth Opening: Normal  TM Distance: Normal  Tongue: Normal  Neck ROM: Normal ROM    Dental:  Intact        Anesthesia Plan  Type of Anesthesia, risks & benefits discussed:    Anesthesia Type: Gen ETT  Intra-op Monitoring Plan: Standard ASA Monitors and Art Line  Post Op Pain Control Plan: multimodal analgesia and IV/PO Opioids PRN  Airway Plan: Direct, Post-Induction  Informed Consent: Informed consent signed with the Patient and all parties understand the risks and agree with anesthesia plan.  All questions answered. Patient consented to blood products? Yes  ASA Score: 3  Day of Surgery Review of History & Physical: H&P Update referred to the surgeon/provider.    Ready For Surgery From Anesthesia Perspective.     .

## 2025-03-31 NOTE — ANESTHESIA PROCEDURE NOTES
Arterial    Diagnosis: Esophageal CA    Patient location during procedure: holding area  Timeout: 3/31/2025 6:58 AM  Procedure end time: 3/31/2025 6:59 AM    Staffing  Authorizing Provider: Linh James MD  Performing Provider: Linh James MD    Staffing  Performed by: Linh James MD  Authorized by: Linh James MD    Anesthesiologist was present at the time of the procedure.    Preanesthetic Checklist  Completed: patient identified, IV checked, site marked, risks and benefits discussed, surgical consent, monitors and equipment checked, pre-op evaluation, timeout performed and anesthesia consent givenArterial  Skin Prep: chlorhexidine gluconate  Local Infiltration: lidocaine  Orientation: right  Location: radial    Catheter Size: 20 G  Catheter placement by Anatomical landmarks. Heme positive aspiration all ports. Insertion Attempts: 1  Assessment  Dressing: secured with tape and tegaderm  Patient: Tolerated well              Patient is becoming restless and thrashing in cot at this time. Family is requesting something to relax him again. García Sun CNP contacted through perfect serve at this time.       Dariel Tidwell RN  06/15/21 [id number]

## 2025-03-31 NOTE — TRANSFER OF CARE
Anesthesia Transfer of Care Note    Patient: Marcelino Al    Procedure(s) Performed: Procedure(s) (LRB):  ROBOTIC ESOPHAGECTOMY, TRANSHIATAL (N/A)    Patient location: PACU    Anesthesia Type: general    Transport from OR: Transported from OR on room air with adequate spontaneous ventilation    Post pain: adequate analgesia    Post assessment: no apparent anesthetic complications and tolerated procedure well    Post vital signs: stable    Level of consciousness: sedated    Nausea/Vomiting: no nausea/vomiting    Complications: none    Transfer of care protocol was followed      Last vitals: Visit Vitals  BP (!) 104/53 (BP Location: Right arm, Patient Position: Lying)   Pulse (!) 59   Temp 36.6 °C (97.9 °F)   Resp 15   Ht 6' (1.829 m)   Wt 80.2 kg (176 lb 12.9 oz)   SpO2 95%   BMI 23.98 kg/m²

## 2025-03-31 NOTE — ANESTHESIA PROCEDURE NOTES
Intubation    Date/Time: 3/31/2025 7:37 AM    Performed by: Ruth Rodriguez CRNA  Authorized by: Linh James MD    Intubation:     Induction:  Intravenous    Intubated:  Postinduction    Mask Ventilation:  Easy mask    Attempts:  1    Attempted By:  CRNA    Method of Intubation:  Video laryngoscopy    Blade:  Melendez 4    Laryngeal View Grade: Grade IIA - cords partially seen      Difficult Airway Encountered?: No      Complications:  None    Airway Device:  Double lumen tube left    Airway Device Size:  37F    Style/Cuff Inflation:  Cuffed (inflated to minimal occlusive pressure)    Inflation Amount (mL):  5    Tube secured:  31    Secured at:  The lips    Placement Verified By:  Capnometry and Fiber optic visualization    Complicating Factors:  None    Findings Post-Intubation:  BS equal bilateral and atraumatic/condition of teeth unchanged

## 2025-04-01 LAB
ANION GAP SERPL CALC-SCNC: 13 MEQ/L
BASOPHILS # BLD AUTO: 0.04 X10(3)/MCL
BASOPHILS NFR BLD AUTO: 0.4 %
BUN SERPL-MCNC: 12.2 MG/DL (ref 8.4–25.7)
CALCIUM SERPL-MCNC: 8.8 MG/DL (ref 8.8–10)
CHLORIDE SERPL-SCNC: 106 MMOL/L (ref 98–107)
CO2 SERPL-SCNC: 20 MMOL/L (ref 23–31)
CREAT SERPL-MCNC: 0.84 MG/DL (ref 0.72–1.25)
CREAT/UREA NIT SERPL: 15
EOSINOPHIL # BLD AUTO: 0.02 X10(3)/MCL (ref 0–0.9)
EOSINOPHIL NFR BLD AUTO: 0.2 %
ERYTHROCYTE [DISTWIDTH] IN BLOOD BY AUTOMATED COUNT: 12.3 % (ref 11.5–17)
GFR SERPLBLD CREATININE-BSD FMLA CKD-EPI: >60 ML/MIN/1.73/M2
GLUCOSE SERPL-MCNC: 130 MG/DL (ref 82–115)
HCT VFR BLD AUTO: 40.6 % (ref 42–52)
HGB BLD-MCNC: 13.6 G/DL (ref 14–18)
IMM GRANULOCYTES # BLD AUTO: 0.04 X10(3)/MCL (ref 0–0.04)
IMM GRANULOCYTES NFR BLD AUTO: 0.4 %
LYMPHOCYTES # BLD AUTO: 0.57 X10(3)/MCL (ref 0.6–4.6)
LYMPHOCYTES NFR BLD AUTO: 5.5 %
MAGNESIUM SERPL-MCNC: 1.9 MG/DL (ref 1.6–2.6)
MCH RBC QN AUTO: 32.4 PG (ref 27–31)
MCHC RBC AUTO-ENTMCNC: 33.5 G/DL (ref 33–36)
MCV RBC AUTO: 96.7 FL (ref 80–94)
MONOCYTES # BLD AUTO: 0.94 X10(3)/MCL (ref 0.1–1.3)
MONOCYTES NFR BLD AUTO: 9 %
NEUTROPHILS # BLD AUTO: 8.78 X10(3)/MCL (ref 2.1–9.2)
NEUTROPHILS NFR BLD AUTO: 84.5 %
NRBC BLD AUTO-RTO: 0 %
OHS QRS DURATION: 76 MS
OHS QTC CALCULATION: 437 MS
PLATELET # BLD AUTO: 151 X10(3)/MCL (ref 130–400)
PMV BLD AUTO: 10.6 FL (ref 7.4–10.4)
POTASSIUM SERPL-SCNC: 4.4 MMOL/L (ref 3.5–5.1)
RBC # BLD AUTO: 4.2 X10(6)/MCL (ref 4.7–6.1)
SODIUM SERPL-SCNC: 139 MMOL/L (ref 136–145)
WBC # BLD AUTO: 10.39 X10(3)/MCL (ref 4.5–11.5)

## 2025-04-01 PROCEDURE — 11000001 HC ACUTE MED/SURG PRIVATE ROOM

## 2025-04-01 PROCEDURE — 99024 POSTOP FOLLOW-UP VISIT: CPT | Mod: ,,, | Performed by: NURSE PRACTITIONER

## 2025-04-01 PROCEDURE — 93005 ELECTROCARDIOGRAM TRACING: CPT

## 2025-04-01 PROCEDURE — 63600175 PHARM REV CODE 636 W HCPCS: Performed by: NURSE PRACTITIONER

## 2025-04-01 PROCEDURE — 93010 ELECTROCARDIOGRAM REPORT: CPT | Mod: ,,, | Performed by: INTERNAL MEDICINE

## 2025-04-01 PROCEDURE — 36415 COLL VENOUS BLD VENIPUNCTURE: CPT | Performed by: NURSE PRACTITIONER

## 2025-04-01 PROCEDURE — 80048 BASIC METABOLIC PNL TOTAL CA: CPT | Performed by: NURSE PRACTITIONER

## 2025-04-01 PROCEDURE — 25000003 PHARM REV CODE 250: Performed by: SURGERY

## 2025-04-01 PROCEDURE — 25000003 PHARM REV CODE 250: Performed by: NURSE PRACTITIONER

## 2025-04-01 PROCEDURE — 83735 ASSAY OF MAGNESIUM: CPT | Performed by: NURSE PRACTITIONER

## 2025-04-01 PROCEDURE — 85025 COMPLETE CBC W/AUTO DIFF WBC: CPT | Performed by: NURSE PRACTITIONER

## 2025-04-01 PROCEDURE — 63600175 PHARM REV CODE 636 W HCPCS: Performed by: SURGERY

## 2025-04-01 PROCEDURE — 99900035 HC TECH TIME PER 15 MIN (STAT)

## 2025-04-01 RX ORDER — ACETAMINOPHEN 10 MG/ML
1000 INJECTION, SOLUTION INTRAVENOUS EVERY 8 HOURS
Status: COMPLETED | OUTPATIENT
Start: 2025-04-01 | End: 2025-04-02

## 2025-04-01 RX ORDER — METOPROLOL TARTRATE 1 MG/ML
5 INJECTION, SOLUTION INTRAVENOUS
Status: DISCONTINUED | OUTPATIENT
Start: 2025-04-01 | End: 2025-04-01

## 2025-04-01 RX ORDER — METOPROLOL TARTRATE 1 MG/ML
5 INJECTION, SOLUTION INTRAVENOUS ONCE
Status: COMPLETED | OUTPATIENT
Start: 2025-04-01 | End: 2025-04-01

## 2025-04-01 RX ORDER — DILTIAZEM HYDROCHLORIDE 5 MG/ML
10 INJECTION INTRAVENOUS ONCE
Status: COMPLETED | OUTPATIENT
Start: 2025-04-01 | End: 2025-04-01

## 2025-04-01 RX ORDER — HYDROMORPHONE HYDROCHLORIDE 2 MG/ML
1 INJECTION, SOLUTION INTRAMUSCULAR; INTRAVENOUS; SUBCUTANEOUS
Status: DISPENSED | OUTPATIENT
Start: 2025-04-01 | End: 2025-04-04

## 2025-04-01 RX ORDER — METOPROLOL TARTRATE 1 MG/ML
5 INJECTION, SOLUTION INTRAVENOUS
Status: DISCONTINUED | OUTPATIENT
Start: 2025-04-01 | End: 2025-04-07 | Stop reason: HOSPADM

## 2025-04-01 RX ADMIN — SODIUM CHLORIDE, POTASSIUM CHLORIDE, SODIUM LACTATE AND CALCIUM CHLORIDE: 600; 310; 30; 20 INJECTION, SOLUTION INTRAVENOUS at 03:04

## 2025-04-01 RX ADMIN — ACETAMINOPHEN 1000 MG: 10 INJECTION INTRAVENOUS at 04:04

## 2025-04-01 RX ADMIN — METOPROLOL TARTRATE 5 MG: 1 INJECTION, SOLUTION INTRAVENOUS at 06:04

## 2025-04-01 RX ADMIN — MUPIROCIN: 20 OINTMENT TOPICAL at 08:04

## 2025-04-01 RX ADMIN — PROCHLORPERAZINE EDISYLATE 5 MG: 5 INJECTION INTRAMUSCULAR; INTRAVENOUS at 03:04

## 2025-04-01 RX ADMIN — ACETAMINOPHEN 1000 MG: 10 INJECTION INTRAVENOUS at 01:04

## 2025-04-01 RX ADMIN — MUPIROCIN: 20 OINTMENT TOPICAL at 09:04

## 2025-04-01 RX ADMIN — LORAZEPAM 0.25 MG: 2 INJECTION INTRAMUSCULAR; INTRAVENOUS at 01:04

## 2025-04-01 RX ADMIN — AMIODARONE HYDROCHLORIDE 150 MG: 1.5 INJECTION, SOLUTION INTRAVENOUS at 11:04

## 2025-04-01 RX ADMIN — ACETAMINOPHEN 1000 MG: 10 INJECTION INTRAVENOUS at 08:04

## 2025-04-01 RX ADMIN — SODIUM CHLORIDE, POTASSIUM CHLORIDE, SODIUM LACTATE AND CALCIUM CHLORIDE: 600; 310; 30; 20 INJECTION, SOLUTION INTRAVENOUS at 01:04

## 2025-04-01 RX ADMIN — DILTIAZEM HYDROCHLORIDE 10 MG: 5 INJECTION, SOLUTION INTRAVENOUS at 08:04

## 2025-04-01 RX ADMIN — ENOXAPARIN SODIUM 40 MG: 40 INJECTION SUBCUTANEOUS at 09:04

## 2025-04-01 RX ADMIN — METOPROLOL TARTRATE 5 MG: 1 INJECTION, SOLUTION INTRAVENOUS at 09:04

## 2025-04-01 RX ADMIN — MORPHINE SULFATE 2 MG: 4 INJECTION, SOLUTION INTRAMUSCULAR; INTRAVENOUS at 01:04

## 2025-04-01 RX ADMIN — MORPHINE SULFATE 2 MG: 4 INJECTION, SOLUTION INTRAMUSCULAR; INTRAVENOUS at 03:04

## 2025-04-01 RX ADMIN — HYDRALAZINE HYDROCHLORIDE 10 MG: 20 INJECTION INTRAMUSCULAR; INTRAVENOUS at 03:04

## 2025-04-01 RX ADMIN — AMIODARONE HYDROCHLORIDE 1 MG/MIN: 1.8 INJECTION, SOLUTION INTRAVENOUS at 11:04

## 2025-04-01 NOTE — AI DETERIORATION ALERT
Artificial Intelligence Notification  [facility]  [address] [place] [address]  Phone: [phone]    This documentation was triggered by an Artificial Intelligence Notification:    Admit Date: 3/31/2025   LOS: 1  Code Status: Full Code  : 1945  Age: 79 y.o.  Weight:   Wt Readings from Last 1 Encounters:   25 80.2 kg (176 lb 12.9 oz)        Sex: male  [facility]  MRN: [id number]  Attending Physician: Gabe Talbot MD     Date of Alert: 2025  Time AI Alert Received: 1618            Vitals:    25 1610   BP: 111/76   Pulse: (!) 175   Resp:    Temp: 97.9 °F (36.6 °C)     SpO2: (!) 91 %      Artificial Intelligence alert discussed with Provider:     Name: Spoke with pts RN.    Date/Time of Provider Notification:       Patient Condition: pt AAO POD1 esophagectomy with dr. Talbot. Suspect documented HR of 175 was erraneous. RN states she did a manual Pulse check of 101.   Other Vitals stable. No documented EKG this admission and patient not on cardiac monitoring. EKG ordered to confirm no rhythm or HR abnormalities.

## 2025-04-01 NOTE — PROGRESS NOTES
[facility]   Surgical Oncology  Progress Note    Subjective:   HPI:  This is an 79-year-old male with distal esophageal adenocarcinoma status post neoadjuvant chemoradiation.  PET-CT shows localized disease without evidence of distant metastasis, repeat endoscopy shows persistent disease.      Interval History:  Patient is postop day 1 transhiatal esophagectomy.  Biggest complaint overnight was anxiety.  Pain currently controlled.  He is awake and alert.  No acute voice change.  Respirations unlabored on nasal cannula at 2 L.  NG to low intermittent suction with minimal output.  Abdomen soft and nontender.  Urinary catheter removed this morning.    Post-Op Info:  Procedure(s) (LRB):  ROBOTIC ESOPHAGECTOMY, TRANSHIATAL (N/A)   1 Day Post-Op      Medications:  Continuous Infusions:   0.9% NaCl   Intravenous Continuous        lactated ringers   Intravenous Continuous 100 mL/hr at 04/01/25 0129 New Bag at 04/01/25 0129     Scheduled Meds:   acetaminophen  1,000 mg Intravenous Q8H    enoxparin  40 mg Subcutaneous Daily    lorazepam  0.25 mg Intravenous Once    mupirocin   Nasal BID     PRN Meds:    Current Facility-Administered Medications:     diazePAM, 2 mg, Intravenous, Q4H PRN    diphenhydrAMINE, 12.5 mg, Intravenous, Q4H PRN    hydrALAZINE, 10 mg, Intravenous, Q4H PRN    morphine, 2 mg, Intravenous, Q1H PRN    naloxone, 0.02 mg, Intravenous, PRN    ondansetron, 4 mg, Intravenous, Q12H PRN    prochlorperazine, 5 mg, Intravenous, Q6H PRN     Objective:     Vital Signs (Most Recent):  Temp: 98.4 °F (36.9 °C) (04/01/25 0740)  Pulse: 92 (04/01/25 0740)  Resp: 20 (04/01/25 0740)  BP: 136/68 (04/01/25 0740)  SpO2: (!) 93 % (04/01/25 0740) Vital Signs (24h Range):  Temp:  [97.5 °F (36.4 °C)-100.5 °F (38.1 °C)] 98.4 °F (36.9 °C)  Pulse:  [] 92  Resp:  [10-20] 20  SpO2:  [92 %-100 %] 93 %  BP: (104-173)/(53-97) 136/68       Intake/Output Summary (Last 24 hours) at 4/1/2025 0826  Last data  filed at 4/1/2025 0600  Gross per 24 hour   Intake 3000 ml   Output 1025 ml   Net 1975 ml       Physical Exam  Constitutional:       General: He is not in acute distress.  HENT:      Head: Atraumatic.      Nose: Nose normal.      Mouth/Throat:      Mouth: Mucous membranes are moist.   Eyes:      General: No scleral icterus.     Extraocular Movements: Extraocular movements intact.      Conjunctiva/sclera: Conjunctivae normal.   Neck:      Comments: Left lateral incision covered with occlusive dressing, minimal shadowing.  TOM with minimal serosanguineous output  Cardiovascular:      Rate and Rhythm: Normal rate and regular rhythm.      Pulses: Normal pulses.      Heart sounds: Normal heart sounds.   Pulmonary:      Effort: Pulmonary effort is normal. No respiratory distress.      Breath sounds: Normal breath sounds.      Comments: Nasal cannula 2 L  Abdominal:      General: Bowel sounds are normal.      Palpations: Abdomen is soft.      Comments: Upper midline incision approximated, lap site x5 approximated   Musculoskeletal:         General: No swelling.      Cervical back: Normal range of motion and neck supple. No rigidity.   Skin:     General: Skin is warm and dry.   Neurological:      General: No focal deficit present.      Mental Status: He is alert and oriented to person, place, and time.   Psychiatric:         Mood and Affect: Mood normal.         Behavior: Behavior normal.         Significant Labs:  BMP:   Recent Labs   Lab 04/01/25  0413      K 4.4      CO2 20*   BUN 12.2   CREATININE 0.84   CALCIUM 8.8   MG 1.90     CBC:   Recent Labs   Lab 04/01/25  0413   WBC 10.39   RBC 4.20*   HGB 13.6*   HCT 40.6*      MCV 96.7*   MCH 32.4*   MCHC 33.5       Significant Diagnostics:    No new imaging for review    Assessment/Plan:     Distal esophageal adenocarcinoma status post neoadjuvant therapy.  Now status post transhiatal esophagectomy on 03/31/2025.      Plan:   - discontinue morphine, add  Dilaudid 1 mg q.2 hours p.r.n. breakthrough pain.    -continue affirmative q.8 hours   -keep NPO with the exception of ice chips sparingly.    -we will transitioned to oral pain control when approved for p.o. intake.    - continue NGT low intermittent suction.  J-tube to gravity.  -continue LR as ordered   -encourage ambulation and incentive spirometer use.  Discussed in detail with the patient and spouse.  Verbalized understanding.    - labs reviewed.  No intervention needed.    -we will plan for CT esophagram on Thursday morning.        Rebecca Woo, NP  Surgical Oncology  [facility]

## 2025-04-02 PROBLEM — I48.91 ATRIAL FIBRILLATION WITH RVR: Status: ACTIVE | Noted: 2025-04-02

## 2025-04-02 LAB
ANION GAP SERPL CALC-SCNC: 8 MEQ/L
BASOPHILS # BLD AUTO: 0.03 X10(3)/MCL
BASOPHILS NFR BLD AUTO: 0.3 %
BUN SERPL-MCNC: 10.7 MG/DL (ref 8.4–25.7)
CALCIUM SERPL-MCNC: 8.3 MG/DL (ref 8.8–10)
CHLORIDE SERPL-SCNC: 105 MMOL/L (ref 98–107)
CO2 SERPL-SCNC: 25 MMOL/L (ref 23–31)
CREAT SERPL-MCNC: 0.73 MG/DL (ref 0.72–1.25)
CREAT/UREA NIT SERPL: 15
EOSINOPHIL # BLD AUTO: 0.19 X10(3)/MCL (ref 0–0.9)
EOSINOPHIL NFR BLD AUTO: 1.9 %
ERYTHROCYTE [DISTWIDTH] IN BLOOD BY AUTOMATED COUNT: 12.7 % (ref 11.5–17)
GFR SERPLBLD CREATININE-BSD FMLA CKD-EPI: >60 ML/MIN/1.73/M2
GLUCOSE SERPL-MCNC: 118 MG/DL (ref 82–115)
HCT VFR BLD AUTO: 38.7 % (ref 42–52)
HGB BLD-MCNC: 13 G/DL (ref 14–18)
IMM GRANULOCYTES # BLD AUTO: 0.04 X10(3)/MCL (ref 0–0.04)
IMM GRANULOCYTES NFR BLD AUTO: 0.4 %
LYMPHOCYTES # BLD AUTO: 0.64 X10(3)/MCL (ref 0.6–4.6)
LYMPHOCYTES NFR BLD AUTO: 6.5 %
MAGNESIUM SERPL-MCNC: 1.8 MG/DL (ref 1.6–2.6)
MCH RBC QN AUTO: 32.7 PG (ref 27–31)
MCHC RBC AUTO-ENTMCNC: 33.6 G/DL (ref 33–36)
MCV RBC AUTO: 97.5 FL (ref 80–94)
MONOCYTES # BLD AUTO: 0.84 X10(3)/MCL (ref 0.1–1.3)
MONOCYTES NFR BLD AUTO: 8.5 %
NEUTROPHILS # BLD AUTO: 8.12 X10(3)/MCL (ref 2.1–9.2)
NEUTROPHILS NFR BLD AUTO: 82.4 %
NRBC BLD AUTO-RTO: 0 %
PLATELET # BLD AUTO: 135 X10(3)/MCL (ref 130–400)
PLATELETS.RETICULATED NFR BLD AUTO: 3.5 % (ref 0.9–11.2)
PMV BLD AUTO: 10.6 FL (ref 7.4–10.4)
POTASSIUM SERPL-SCNC: 4 MMOL/L (ref 3.5–5.1)
PSYCHE PATHOLOGY RESULT: NORMAL
RBC # BLD AUTO: 3.97 X10(6)/MCL (ref 4.7–6.1)
SODIUM SERPL-SCNC: 138 MMOL/L (ref 136–145)
WBC # BLD AUTO: 9.86 X10(3)/MCL (ref 4.5–11.5)

## 2025-04-02 PROCEDURE — 85025 COMPLETE CBC W/AUTO DIFF WBC: CPT | Performed by: NURSE PRACTITIONER

## 2025-04-02 PROCEDURE — 63600175 PHARM REV CODE 636 W HCPCS: Performed by: SURGERY

## 2025-04-02 PROCEDURE — 94799 UNLISTED PULMONARY SVC/PX: CPT

## 2025-04-02 PROCEDURE — 83735 ASSAY OF MAGNESIUM: CPT | Performed by: NURSE PRACTITIONER

## 2025-04-02 PROCEDURE — 63600175 PHARM REV CODE 636 W HCPCS: Performed by: NURSE PRACTITIONER

## 2025-04-02 PROCEDURE — 11000001 HC ACUTE MED/SURG PRIVATE ROOM

## 2025-04-02 PROCEDURE — 36415 COLL VENOUS BLD VENIPUNCTURE: CPT | Performed by: NURSE PRACTITIONER

## 2025-04-02 PROCEDURE — 99024 POSTOP FOLLOW-UP VISIT: CPT | Mod: ,,, | Performed by: NURSE PRACTITIONER

## 2025-04-02 PROCEDURE — 80048 BASIC METABOLIC PNL TOTAL CA: CPT | Performed by: NURSE PRACTITIONER

## 2025-04-02 RX ORDER — ACETAMINOPHEN 10 MG/ML
1000 INJECTION, SOLUTION INTRAVENOUS EVERY 8 HOURS
Status: COMPLETED | OUTPATIENT
Start: 2025-04-02 | End: 2025-04-03

## 2025-04-02 RX ORDER — MAGNESIUM SULFATE HEPTAHYDRATE 40 MG/ML
2 INJECTION, SOLUTION INTRAVENOUS ONCE
Status: COMPLETED | OUTPATIENT
Start: 2025-04-02 | End: 2025-04-02

## 2025-04-02 RX ADMIN — ACETAMINOPHEN 1000 MG: 10 INJECTION INTRAVENOUS at 01:04

## 2025-04-02 RX ADMIN — LORAZEPAM 0.25 MG: 2 INJECTION INTRAMUSCULAR; INTRAVENOUS at 11:04

## 2025-04-02 RX ADMIN — LORAZEPAM 0.25 MG: 2 INJECTION INTRAMUSCULAR; INTRAVENOUS at 04:04

## 2025-04-02 RX ADMIN — MUPIROCIN: 20 OINTMENT TOPICAL at 08:04

## 2025-04-02 RX ADMIN — ENOXAPARIN SODIUM 40 MG: 40 INJECTION SUBCUTANEOUS at 04:04

## 2025-04-02 RX ADMIN — SODIUM CHLORIDE, POTASSIUM CHLORIDE, SODIUM LACTATE AND CALCIUM CHLORIDE: 600; 310; 30; 20 INJECTION, SOLUTION INTRAVENOUS at 08:04

## 2025-04-02 RX ADMIN — MUPIROCIN: 20 OINTMENT TOPICAL at 09:04

## 2025-04-02 RX ADMIN — HYDROMORPHONE HYDROCHLORIDE 1 MG: 2 INJECTION, SOLUTION INTRAMUSCULAR; INTRAVENOUS; SUBCUTANEOUS at 11:04

## 2025-04-02 RX ADMIN — SODIUM CHLORIDE, POTASSIUM CHLORIDE, SODIUM LACTATE AND CALCIUM CHLORIDE: 600; 310; 30; 20 INJECTION, SOLUTION INTRAVENOUS at 03:04

## 2025-04-02 RX ADMIN — AMIODARONE HYDROCHLORIDE 0.5 MG/MIN: 1.8 INJECTION, SOLUTION INTRAVENOUS at 05:04

## 2025-04-02 RX ADMIN — MAGNESIUM SULFATE HEPTAHYDRATE 2 G: 40 INJECTION, SOLUTION INTRAVENOUS at 09:04

## 2025-04-02 RX ADMIN — LORAZEPAM 0.25 MG: 2 INJECTION INTRAMUSCULAR; INTRAVENOUS at 05:04

## 2025-04-02 RX ADMIN — ACETAMINOPHEN 1000 MG: 10 INJECTION INTRAVENOUS at 05:04

## 2025-04-02 RX ADMIN — ACETAMINOPHEN 1000 MG: 10 INJECTION INTRAVENOUS at 09:04

## 2025-04-02 NOTE — PROGRESS NOTES
[facility]   Surgical Oncology  Progress Note    Subjective:   HPI:  This is an 79-year-old male with distal esophageal adenocarcinoma status post neoadjuvant chemoradiation.  PET-CT shows localized disease without evidence of distant metastasis, repeat endoscopy shows persistent disease.      Interval History:  Patient is postop day 2 transhiatal esophagectomy.  Noted acute onset atrial fibrillation with RVR around 1800 yesterday.  Placed on amiodarone infusion and Cardiology was consulted.  Heart rate now sustained less than 100, MAP >65.  Does not appear to have history of AFib.  Labs reviewed.  Voiding well. No evidence of anastomotic leak.     Post-Op Info:  Procedure(s) (LRB):  ROBOTIC ESOPHAGECTOMY, TRANSHIATAL (N/A)   2 Days Post-Op      Medications:  Continuous Infusions:   amiodarone in dextrose 5%  0.5 mg/min Intravenous Continuous 16.7 mL/hr at 04/02/25 0504 0.5 mg/min at 04/02/25 0504    lactated ringers   Intravenous Continuous 100 mL/hr at 04/02/25 0308 New Bag at 04/02/25 0308     Scheduled Meds:   enoxparin  40 mg Subcutaneous Daily    magnesium sulfate 2 g IVPB  2 g Intravenous Once    mupirocin   Nasal BID     PRN Meds:    Current Facility-Administered Medications:     diphenhydrAMINE, 12.5 mg, Intravenous, Q4H PRN    hydrALAZINE, 10 mg, Intravenous, Q4H PRN    HYDROmorphone, 1 mg, Intravenous, Q2H PRN    lorazepam, 0.25 mg, Intravenous, Q6H PRN    metoprolol, 5 mg, Intravenous, Q2H PRN    naloxone, 0.02 mg, Intravenous, PRN    ondansetron, 4 mg, Intravenous, Q12H PRN    prochlorperazine, 5 mg, Intravenous, Q6H PRN     Objective:     Vital Signs (Most Recent):  Temp: 97.9 °F (36.6 °C) (04/02/25 0722)  Pulse: 88 (04/02/25 0722)  Resp: 19 (04/02/25 0722)  BP: (!) 154/73 (04/02/25 0722)  SpO2: 95 % (04/02/25 0722) Vital Signs (24h Range):  Temp:  [97.9 °F (36.6 °C)-98.7 °F (37.1 °C)] 97.9 °F (36.6 °C)  Pulse:  [] 88  Resp:  [17-20] 19  SpO2:  [88 %-95 %] 95 %  BP:  ()/() 154/73       Intake/Output Summary (Last 24 hours) at 4/2/2025 0826  Last data filed at 4/2/2025 0426  Gross per 24 hour   Intake 0 ml   Output 1580 ml   Net -1580 ml       Physical Exam  Constitutional:       General: He is not in acute distress.  HENT:      Head: Atraumatic.      Nose: Nose normal.      Mouth/Throat:      Mouth: Mucous membranes are moist.   Eyes:      General: No scleral icterus.     Extraocular Movements: Extraocular movements intact.      Conjunctiva/sclera: Conjunctivae normal.   Neck:      Comments: Left lateral incision approximated with staples. Clean dressing covering incision TOM with minimal serosanguineous output  Cardiovascular:      Rate and Rhythm: Normal rate and regular rhythm.      Pulses: Normal pulses.      Heart sounds: Normal heart sounds.   Pulmonary:      Effort: Pulmonary effort is normal. No respiratory distress.      Breath sounds: Normal breath sounds.      Comments: Nasal cannula 2 L  Abdominal:      General: Bowel sounds are normal.      Palpations: Abdomen is soft.      Comments: Upper midline incision approximated, lap site x5 approximated   Musculoskeletal:         General: No swelling.      Cervical back: Normal range of motion and neck supple. No rigidity.   Skin:     General: Skin is warm and dry.   Neurological:      General: No focal deficit present.      Mental Status: He is alert and oriented to person, place, and time.   Psychiatric:         Mood and Affect: Mood normal.         Behavior: Behavior normal.         Significant Labs:  BMP:   Recent Labs   Lab 04/02/25  0401      K 4.0      CO2 25   BUN 10.7   CREATININE 0.73   CALCIUM 8.3*   MG 1.80     CBC:   Recent Labs   Lab 04/02/25  0401   WBC 9.86   RBC 3.97*   HGB 13.0*   HCT 38.7*      MCV 97.5*   MCH 32.7*   MCHC 33.6       Significant Diagnostics:    No new imaging for review    Assessment/Plan:     Distal esophageal adenocarcinoma status post neoadjuvant therapy.   Now status post transhiatal esophagectomy on 03/31/2025.    Atrial fibrillation with RVR  - cardiology consulted.  Placed on amiodarone infusion.    Plan:   - appreciate cardiology assistance  -keep NPO with the exception of ice chips sparingly.    -we will transitioned to oral pain control when approved for p.o. intake.    - start trickle feeds via J tube at 10 mL/hr.   - decrease LR to 75 mL/hr   -encourage ambulation and incentive spirometer use  - labs reviewed.  No intervention needed.    -we will plan for CT esophagram on Thursday morning.        Rebecca Woo, LAUREN  Surgical Oncology  [facility]

## 2025-04-02 NOTE — PLAN OF CARE
Pt's new PCP at [facility] will be Dr. Bart Daniels per family.    2:15pm-Freedom of choice signed. Referral sent to [facility]  and [facility] via [employer]. Order for tube feeding and Home Health sent to Bobbi at [facility] fax# [phone]. Bobbi notified and said she has a couple ahead of his but she will get to it.

## 2025-04-02 NOTE — CONSULTS
Inpatient Nutrition Assessment    Admit Date: 3/31/2025   Total duration of encounter: 2 days   Patient Age: 79 y.o.    Nutrition Recommendation/Prescription     Once medically feasible, start EN.   TF recs:  Isosource 1.5, start at 10 mL/hr and advance as tolerated to goal rate of 70 mL/hr. At goal, TF will provide:  2520 kcal (100% est needs)  114 gm protein (100% est needs)  1277 mL free water (53% est needs)  FWF: 45 mL/hr that TF is running to better meet fluid needs  **based on a 24 hr per day run time**  Oral diet once medically feasible per MD.    Communication of Recommendations: reviewed with patient and reviewed with family    Nutrition Assessment     Malnutrition Assessment/Nutrition-Focused Physical Exam  (unable to complete 2/2 Junum tab not working)                                                                A minimum of two characteristics is recommended for diagnosis of either severe or non-severe malnutrition.    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  Now status post transhiatal esophagectomy on 03/31/2025.    Atrial fibrillation with RVR    Relevant Medical History: Esophageal Carcinoma, CAD, GERD, HTN, Dyslipidemia     Scheduled Medications:  acetaminophen, 1,000 mg, Q8H  enoxparin, 40 mg, Daily  mupirocin, , BID    Continuous Infusions:  amiodarone in dextrose 5%, Last Rate: 0.5 mg/min (04/02/25 0504)  lactated ringers, Last Rate: 100 mL/hr at 04/02/25 0308    PRN Medications:  diphenhydrAMINE, 12.5 mg, Q4H PRN  hydrALAZINE, 10 mg, Q4H PRN  HYDROmorphone, 1 mg, Q2H PRN  lorazepam, 0.25 mg, Q6H PRN  metoprolol, 5 mg, Q2H PRN  naloxone, 0.02 mg, PRN  ondansetron, 4 mg, Q12H PRN  prochlorperazine, 5 mg, Q6H  "PRN    Calorie Containing IV Medications: no significant kcals from medications at this time    Recent Labs   Lab 25  1314 25  0413 25  0401   NA  --  139 138   K  --  4.4 4.0   CALCIUM  --  8.8 8.3*   MG  --  1.90 1.80   CL  --  106 105   CO2  --  20* 25   BUN  --  12.2 10.7   CREATININE  --  0.84 0.73   EGFRNORACEVR  --  >60 >60   GLUCOSE  --  130* 118*   WBC 4.81 10.39 9.86   HGB 12.0* 13.6* 13.0*   HCT 37.7* 40.6* 38.7*     Nutrition Orders:  Diet NPO  Tube Feedings/Formulas 10; Isosource 1.5; Jejunostomy    Appetite/Oral Intake: NPO/NPO  Factors Affecting Nutritional Intake: NPO  Social Needs Impacting Access to Food: none identified  Food/Caodaism/Cultural Preferences: none reported  Food Allergies: no known food allergies  Last Bowel Movement: 25  Wound(s):  surgical incision     Comments    25: MD consult for TF recs over 24 hrs; pt had J-tube placed on Monday; currently NPO with NG tube to suction. Per family, pt has been eating ok prior to surgery; had lost some weight during chemo but weight is almost back up to usual wt.     Anthropometrics    Height: 6' 3" (190.5 cm), Height Method: Stated  Last Weight: 80.2 kg (176 lb 12.9 oz) (25 1256), Weight Method: Standard Scale  BMI (Calculated): 22.1  BMI Classification: normal (BMI 18.5-24.9)        Ideal Body Weight (IBW), Male: 196 lb     % Ideal Body Weight, Male (lb): 90.21 %                 Usual Body Weight (UBW), k.8 kg  % Usual Body Weight: 98.25  % Weight Change From Usual Weight: -1.96 %  Usual Weight Provided By: family/caregiver    Wt Readings from Last 5 Encounters:   25 80.2 kg (176 lb 12.9 oz)   25 83 kg (183 lb)   25 77.6 kg (171 lb 1.6 oz)   24 79 kg (174 lb 3.2 oz)   24 79.7 kg (175 lb 9.6 oz)     Weight Change(s) Since Admission:   Wt Readings from Last 1 Encounters:   25 1256 80.2 kg (176 lb 12.9 oz)   25 0516 80.2 kg (176 lb 12.9 oz)   25 0553 80.2 kg " (176 lb 12.9 oz)   03/06/25 0946 83.9 kg (185 lb)   Admit Weight: 83.9 kg (185 lb) (03/06/25 0946), Weight Method: Standard Scale    Estimated Needs    Weight Used For Calorie Calculations: 80.2 kg (176 lb 12.9 oz)  Energy Calorie Requirements (kcal): 8008-6593 kcal (30-35 kcal/kg)  Energy Need Method: Kcal/kg  Weight Used For Protein Calculations: 80.2 kg (176 lb 12.9 oz)  Protein Requirements: 113 gm (1.4g/kg)  Fluid Requirements (mL): 2406 mL        Enteral Nutrition     Patient not receiving enteral nutrition at this time.    Parenteral Nutrition     Patient not receiving parenteral nutrition support at this time.    Evaluation of Received Nutrient Intake    Calories: not meeting estimated needs  Protein: not meeting estimated needs    Patient Education     Not applicable.    Nutrition Diagnosis     PES: Inadequate oral intake related to altered GI function as evidenced by NPO since admit. (new)     PES:            Nutrition Interventions     Intervention(s): modified composition of enteral nutrition, modified rate of enteral nutrition, and collaboration with other providers  Intervention(s):      Goal: Meet greater than 80% of nutritional needs by follow-up. (new)  Goal: Maintain weight throughout hospitalization. (new)    Nutrition Goals & Monitoring     Dietitian will monitor: enteral nutrition intake and weight  Discharge planning: tube feeding (Isosource 1.5, goal rate of 70mL/hr)  Nutrition Risk/Follow-Up: high (follow-up in 1-4 days)   Please consult if re-assessment needed sooner.

## 2025-04-02 NOTE — CONSULTS
Inpatient consult to Cardiology  Consult performed by: Anabell Billy FNP  Consult ordered by: Gabe Talbot MD  Reason for consult: Atrial Fibrillation    [facility]    Cardiology  Consult Note    Patient Name: Marcelino Al  MRN: [id number]  Admission Date: 3/31/2025  Hospital Length of Stay: 2 days  Code Status: No Order   Attending Provider: Gabe Talbot MD   Consulting Provider: DANIEL Meyers  Primary Care Physician: No primary care provider on file.  Principal Problem:<principal problem not specified>    Patient information was obtained from patient, past medical records, and ER records.     Subjective:     Chief Complaint/Reason for Consult: New Onset A-Fib    HPI: 79 y.o. male known to [facility], Dr. Maier, with a PMH of Esophageal Carcinoma, CAD, GERD, HTN, and Dyslipidemia who presented to [facility] on 3.31.25 for a scheduled transhiatal esophagectomy.  On 4.1.25 he was noted to be tachycardic and EKG confirmed Atrial Fibrillation.  [facility] was called and the patient was started on an Amiodarone Gtt per protocol with Bolus. H/H stable, K 4.0 and Mg 1.9.  [facility] was consulted for management of he new onset Atrial Fibrillation.      PMH: Esophageal Carcinoma, CAD, GERD, HTN, Dyslipidemia  PSH:  Coronary Stent Placement (2003), Knee surgery  Family History: Mother: Leukemia  Social History:  No tobacco, ETOH, or illicit drug use    Previous Cardiac Diagnostics:   PET (3.19.25)   This is a normal perfusion study, no perfusion defects noted. There is no evidence of ischemia other than reduced BFR.    This scan is suggestive of low risk for future cardiovascular events.    The left ventricular cavity is noted to be normal on the stress studies. The stress left ventricular ejection fraction was calculated to be 79% and left ventricular global function is normal. The rest left ventricular cavity is noted to be normal. The rest left ventricular ejection fraction was calculated  to be 76% and rest left ventricular global function is normal.    When compared to the resting ejection fraction (76%), the stress ejection fraction (79%) has increased.    Transient ischemic dilatation is present and has been described as a marker for high risk coronary artery disease. It has also been described in microvascular disease, hypertensive heart disease as well as cardiac deconditioning.    The study quality is average.    Calcium scoring was not performed in this patient due to specific concerns that can affect accuracy.    There was a rise in myocardial blood flow between rest and stress. Global myocardial blood flow reserve was 1.77. Myocardial blood flow reserve is globally abnormal, placing the patient at a higher coronary event risk.       TTE (5.9.2024)  LV cavity size is normal  Mild Concentric LVH  Normal Global LV contractility.  LVEF 68%  Grade I DD.   LV filling pressures are estimated to be normal  No evidence of AS or AR  No MS.  Trace/Physiologic MR  No Pericardial Effusion    Past Medical History:   Diagnosis Date    Arthritis     bilat hands    BPH with urinary obstruction     CAD (coronary artery disease)     Elevated cholesterol     Elevated PSA     GE junction carcinoma     Hypertension     Sleep apnea     CPAP     Past Surgical History:   Procedure Laterality Date    CAROTID STENT      COLONOSCOPY      EYE SURGERY      HEMORRHOID SURGERY      INSERTION OF TUNNELED CENTRAL VENOUS CATHETER (CVC) WITH SUBCUTANEOUS PORT Right 10/21/2024    Procedure: INSERTION, PORT-A-CATH (Do 1st Pt. has Oncologist appt. after);  Surgeon: Moy Ruvalcaba MD;  Location: [facility];  Service: General;  Laterality: Right;  Right internal jugular    JOINT REPLACEMENT  2020    KNEE SURGERY      right knee replaced     PROSTATE BIOPSY      PROSTATE SURGERY  2021    ROBOT-ASSISTED TRANSHIATAL ESOPHAGECTOMY N/A 3/31/2025    Procedure: ROBOTIC ESOPHAGECTOMY, TRANSHIATAL;  Surgeon: Gabe Talbot MD;   Location: [facility] OR;  Service: General;  Laterality: N/A;  lap robotic transhiatal esophagectomy with j tube placement // XX    TONSILLECTOMY       Review of patient's allergies indicates:  No Known Allergies  No current facility-administered medications on file prior to encounter.     Current Outpatient Medications on File Prior to Encounter   Medication Sig    aspirin (ECOTRIN) 81 MG EC tablet Take 81 mg by mouth every morning.    atorvastatin (LIPITOR) 80 MG tablet Take 80 mg by mouth every morning.    cholecalciferol, vitamin D3, (VITAMIN D3) 50 mcg (2,000 unit) Cap capsule Take 2,000 Units by mouth once daily.    cyanocobalamin (VITAMIN B-12) 1000 MCG tablet Take 1,000 mcg by mouth every morning.    ezetimibe (ZETIA) 10 mg tablet Take 10 mg by mouth every morning.    LIDOcaine-prilocaine (EMLA) cream Apply topically as needed (apply to [facility] site 30 minutes prior to chemotherapy).    losartan (COZAAR) 25 MG tablet Take 25 mg by mouth 2 (two) times a day.    multivitamin-minerals-lutein (MULTIVITAMIN 50 PLUS) Tab Take 1 tablet by mouth every morning.    ondansetron (ZOFRAN) 8 MG tablet Take 1 tablet (8 mg total) by mouth every 8 (eight) hours as needed.    pantoprazole (PROTONIX) 40 MG tablet Take 40 mg by mouth every morning.    prochlorperazine (COMPAZINE) 10 MG tablet Take 1 tablet (10 mg total) by mouth every 8 (eight) hours as needed (nausea).    traMADoL (ULTRAM) 50 mg tablet Take 1 tablet by mouth daily as needed.    traZODone (DESYREL) 100 MG tablet Take 100 mg by mouth every evening.    amLODIPine (NORVASC) 5 MG tablet Take 5 mg by mouth every evening.     Family History       Problem Relation (Age of Onset)    Cancer Sister    Heart disease Father, Brother, Brother    Stroke Brother          Tobacco Use    Smoking status: Former     Current packs/day: 0.00     Average packs/day: 1 pack/day for 30.0 years (30.0 ttl pk-yrs)     Types: Cigarettes     Quit date: 11/15/1995     Years since quittin.4      Passive exposure: Past    Smokeless tobacco: Never   Substance and Sexual Activity    Alcohol use: Yes     Alcohol/week: 8.0 standard drinks of alcohol     Types: 8 Glasses of wine per week     Comment: couple glasses of wine a week    Drug use: Never    Sexual activity: Not Currently     Partners: Female     Birth control/protection: None       Review of Systems   Respiratory:  Negative for chest tightness and shortness of breath.    Cardiovascular:  Negative for chest pain and palpitations.   Neurological:  Negative for dizziness and light-headedness.   All other systems reviewed and are negative.    Objective:     Vital Signs (Most Recent):  Temp: 97.9 °F (36.6 °C) (04/02/25 0722)  Pulse: 88 (04/02/25 0722)  Resp: 19 (04/02/25 0722)  BP: (!) 154/73 (04/02/25 0722)  SpO2: 95 % (04/02/25 0722) Vital Signs (24h Range):  Temp:  [97.9 °F (36.6 °C)-98.7 °F (37.1 °C)] 97.9 °F (36.6 °C)  Pulse:  [] 88  Resp:  [17-20] 19  SpO2:  [88 %-95 %] 95 %  BP: ()/() 154/73   Weight: 80.2 kg (176 lb 12.9 oz)  Body mass index is 22.1 kg/m².  SpO2: 95 %       Intake/Output Summary (Last 24 hours) at 4/2/2025 0755  Last data filed at 4/2/2025 0426  Gross per 24 hour   Intake 0 ml   Output 1580 ml   Net -1580 ml     Lines/Drains/Airways       Central Venous Catheter Line  Duration                  PowerPort A Cath Single Lumen 10/21/24 1031 Internal Jugular Right 162 days              Drain  Duration                  Closed/Suction Drain 03/31/25 Tube - 1 Neck Bulb 2 days         Gastrostomy/Enterostomy 03/31/25 Jejunostomy tube 2 days         NG/OG Tube 03/31/25 1120 18 Fr. Right nostril 1 day              Peripheral Intravenous Line  Duration                  Peripheral IV - Single Lumen 04/01/25 2320 20 G Right Antecubital <1 day         Peripheral IV - Single Lumen 04/01/25 2321 20 G Anterior;Left Forearm <1 day                  Significant Labs:   Chemistries:   Recent Labs   Lab 04/01/25  0413 04/02/25  0403     138   K 4.4 4.0    105   CO2 20* 25   BUN 12.2 10.7   CREATININE 0.84 0.73   CALCIUM 8.8 8.3*   GLUCOSE 130* 118*   MG 1.90 1.80        CBC/Anemia Labs: Coags:    Recent Labs   Lab 25  1314 25  0413 25  0401   WBC 4.81 10.39 9.86   HGB 12.0* 13.6* 13.0*   HCT 37.7* 40.6* 38.7*   PLT 98* 151 135   .1* 96.7* 97.5*   RDW 12.1 12.3 12.7    Recent Labs   Lab 25  1314   INR 1.3   APTT 27.0        Significant Imaging:    EK.2.25 @ 0710  Telemetry:      Physical Exam  Vitals and nursing note reviewed.   Constitutional:       Appearance: Normal appearance.   HENT:      Nose:      Comments: NG Tube  Cardiovascular:      Rate and Rhythm: Normal rate and regular rhythm.      Pulses: Normal pulses.   Pulmonary:      Effort: Pulmonary effort is normal.   Skin:     General: Skin is warm.   Neurological:      Mental Status: He is alert and oriented to person, place, and time.     Home Medications:   Medications Ordered Prior to Encounter[1]  Current Schedule Inpatient Medications:   enoxparin  40 mg Subcutaneous Daily    mupirocin   Nasal BID     Continuous Infusions:   amiodarone in dextrose 5%  0.5 mg/min Intravenous Continuous 16.7 mL/hr at 25 0504 0.5 mg/min at 25 0504    lactated ringers   Intravenous Continuous 100 mL/hr at 25 0308 New Bag at 25 0308     Assessment:   New Onset Atrial Fibrillation-- Now NSR      -ChadsVasc: 4  Distal Esophageal Adenocarcinoma s/p Transhiatal Esophagectomy 3.31.25  CAD s/p PCI       -Normal PET 3/2025  HTN  Dyslipidemia        Plan:   Continue Amiodarone Gtt per protocol with plans to transition to Oral Load when patient able to take PO medications  Recommend FD anticoagulation when ok with Surgery for stroke risk reduction in the setting of A-Fib  Keep K > 4.0 and Mg > 2.0  Give 2 gm MgSO4 IV x 1 today  Will f/u in am tomorrow      Thank you for your consult.     Anabell Billy, PASCUALP  Cardiology  [facility]     Physician Addendum:    Patient's cardiac care is performed as a split-shared visit with LOREN d/t complicated medical management as detailed in A/P and associated high acuity requiring physician expertise. I obtained and performed relevant components of history/exam. Medical decision-making is formulated by me. It is a pleasure to care for the patient.    Impression/Plan:  AF - continue IV Amio loading    - start full AC tx when ok by surgery  Hypomagnesia - replace Mg        Delfino Manzo MD  [facility] Cardiology Team           [1]   No current facility-administered medications on file prior to encounter.     Current Outpatient Medications on File Prior to Encounter   Medication Sig Dispense Refill    aspirin (ECOTRIN) 81 MG EC tablet Take 81 mg by mouth every morning.      atorvastatin (LIPITOR) 80 MG tablet Take 80 mg by mouth every morning.      cholecalciferol, vitamin D3, (VITAMIN D3) 50 mcg (2,000 unit) Cap capsule Take 2,000 Units by mouth once daily.      cyanocobalamin (VITAMIN B-12) 1000 MCG tablet Take 1,000 mcg by mouth every morning.      ezetimibe (ZETIA) 10 mg tablet Take 10 mg by mouth every morning.      LIDOcaine-prilocaine (EMLA) cream Apply topically as needed (apply to mediport site 30 minutes prior to chemotherapy). 30 g 2    losartan (COZAAR) 25 MG tablet Take 25 mg by mouth 2 (two) times a day.      multivitamin-minerals-lutein (MULTIVITAMIN 50 PLUS) Tab Take 1 tablet by mouth every morning.      ondansetron (ZOFRAN) 8 MG tablet Take 1 tablet (8 mg total) by mouth every 8 (eight) hours as needed. 60 tablet 6    pantoprazole (PROTONIX) 40 MG tablet Take 40 mg by mouth every morning.      prochlorperazine (COMPAZINE) 10 MG tablet Take 1 tablet (10 mg total) by mouth every 8 (eight) hours as needed (nausea). 30 tablet 1    traMADoL (ULTRAM) 50 mg tablet Take 1 tablet by mouth daily as needed.      traZODone (DESYREL) 100 MG tablet Take 100 mg by mouth every  evening.      amLODIPine (NORVASC) 5 MG tablet Take 5 mg by mouth every evening.  2

## 2025-04-02 NOTE — PLAN OF CARE
04/02/25 1019   Discharge Assessment   Assessment Type Discharge Planning Assessment   Confirmed/corrected address, phone number and insurance Yes   Confirmed Demographics Correct on Facesheet   Source of Information patient;family   Communicated URIEL with patient/caregiver Date not available/Unable to determine   Reason For Admission GE junction carcinoma   People in Home spouse   Do you expect to return to your current living situation? Yes   Do you have help at home or someone to help you manage your care at home? Yes   Who are your caregiver(s) and their phone number(s)? Elvira Al [phone], Raynida Jonniemichelle [phone]   Prior to hospitilization cognitive status: Alert/Oriented   Current cognitive status: Alert/Oriented   Walking or Climbing Stairs Difficulty no   Dressing/Bathing Difficulty no   Home Accessibility wheelchair accessible   Home Layout Able to live on 1st floor   Equipment Currently Used at Home CPAP;grab bar   Patient currently being followed by outpatient case management? No   Do you currently have service(s) that help you manage your care at home? No   Do you take prescription medications? Yes   Do you have prescription coverage? Yes   Do you have any problems affording any of your prescribed medications? No   Is the patient taking medications as prescribed? yes   Who is going to help you get home at discharge? Spouse & multiple family members   How do you get to doctors appointments? car, drives self   Are you on dialysis? No   Do you take coumadin? No   Discharge Plan A Home Health   Discharge Plan B Home Health   DME Needed Upon Discharge  other (see comments)  (tube feeding supplies)   Discharge Plan discussed with: Patient;Adult children;Spouse/sig other   Name(s) and Number(s) Elvira Cristinamichelle [phone] & Raynida Al [phone]   Transition of Care Barriers None   OTHER   Name(s) of People in Home Elvira Al     Assessment done with pt in bed. Spouse & son at bedside. Pt has 2  sons and 1 daughter that can assist. Adult granddaughter lives next door is a CNA. [facility] [place]   His PCP left the clinic and he hasn't seen his new PCP yet. Son will try to call [facility] and get new PCP's name. No HH. Pt lives in a studio apartment with grab bars in the shower and WC ramp to enter.     Pt has [facility] primary and [facility] managed Medicare secondary. Made copy of [facility] card and given to  to send to registration.

## 2025-04-03 PROBLEM — E44.0 MODERATE MALNUTRITION: Status: ACTIVE | Noted: 2025-04-03

## 2025-04-03 LAB
ANION GAP SERPL CALC-SCNC: 7 MEQ/L
BASOPHILS # BLD AUTO: 0.02 X10(3)/MCL
BASOPHILS NFR BLD AUTO: 0.3 %
BUN SERPL-MCNC: 9.7 MG/DL (ref 8.4–25.7)
CALCIUM SERPL-MCNC: 8.2 MG/DL (ref 8.8–10)
CHLORIDE SERPL-SCNC: 103 MMOL/L (ref 98–107)
CO2 SERPL-SCNC: 26 MMOL/L (ref 23–31)
CREAT SERPL-MCNC: 0.69 MG/DL (ref 0.72–1.25)
CREAT/UREA NIT SERPL: 14
EOSINOPHIL # BLD AUTO: 0.24 X10(3)/MCL (ref 0–0.9)
EOSINOPHIL NFR BLD AUTO: 3.6 %
ERYTHROCYTE [DISTWIDTH] IN BLOOD BY AUTOMATED COUNT: 12.3 % (ref 11.5–17)
GFR SERPLBLD CREATININE-BSD FMLA CKD-EPI: >60 ML/MIN/1.73/M2
GLUCOSE SERPL-MCNC: 118 MG/DL (ref 82–115)
HCT VFR BLD AUTO: 37.8 % (ref 42–52)
HGB BLD-MCNC: 12.3 G/DL (ref 14–18)
IMM GRANULOCYTES # BLD AUTO: 0.02 X10(3)/MCL (ref 0–0.04)
IMM GRANULOCYTES NFR BLD AUTO: 0.3 %
LYMPHOCYTES # BLD AUTO: 0.5 X10(3)/MCL (ref 0.6–4.6)
LYMPHOCYTES NFR BLD AUTO: 7.5 %
MAGNESIUM SERPL-MCNC: 2 MG/DL (ref 1.6–2.6)
MCH RBC QN AUTO: 31.8 PG (ref 27–31)
MCHC RBC AUTO-ENTMCNC: 32.5 G/DL (ref 33–36)
MCV RBC AUTO: 97.7 FL (ref 80–94)
MONOCYTES # BLD AUTO: 0.61 X10(3)/MCL (ref 0.1–1.3)
MONOCYTES NFR BLD AUTO: 9.2 %
NEUTROPHILS # BLD AUTO: 5.26 X10(3)/MCL (ref 2.1–9.2)
NEUTROPHILS NFR BLD AUTO: 79.1 %
NRBC BLD AUTO-RTO: 0 %
PLATELET # BLD AUTO: 134 X10(3)/MCL (ref 130–400)
PLATELETS.RETICULATED NFR BLD AUTO: 3.1 % (ref 0.9–11.2)
PMV BLD AUTO: 10.3 FL (ref 7.4–10.4)
POTASSIUM SERPL-SCNC: 3.7 MMOL/L (ref 3.5–5.1)
RBC # BLD AUTO: 3.87 X10(6)/MCL (ref 4.7–6.1)
SODIUM SERPL-SCNC: 136 MMOL/L (ref 136–145)
WBC # BLD AUTO: 6.65 X10(3)/MCL (ref 4.5–11.5)

## 2025-04-03 PROCEDURE — 80048 BASIC METABOLIC PNL TOTAL CA: CPT | Performed by: NURSE PRACTITIONER

## 2025-04-03 PROCEDURE — 36415 COLL VENOUS BLD VENIPUNCTURE: CPT | Performed by: NURSE PRACTITIONER

## 2025-04-03 PROCEDURE — 25500020 PHARM REV CODE 255: Performed by: SURGERY

## 2025-04-03 PROCEDURE — 63600175 PHARM REV CODE 636 W HCPCS: Performed by: SURGERY

## 2025-04-03 PROCEDURE — 83735 ASSAY OF MAGNESIUM: CPT | Performed by: NURSE PRACTITIONER

## 2025-04-03 PROCEDURE — 36410 VNPNXR 3YR/> PHY/QHP DX/THER: CPT

## 2025-04-03 PROCEDURE — 76937 US GUIDE VASCULAR ACCESS: CPT

## 2025-04-03 PROCEDURE — 11000001 HC ACUTE MED/SURG PRIVATE ROOM

## 2025-04-03 PROCEDURE — 99024 POSTOP FOLLOW-UP VISIT: CPT | Mod: ,,, | Performed by: NURSE PRACTITIONER

## 2025-04-03 PROCEDURE — 02HV33Z INSERTION OF INFUSION DEVICE INTO SUPERIOR VENA CAVA, PERCUTANEOUS APPROACH: ICD-10-PCS | Performed by: SURGERY

## 2025-04-03 PROCEDURE — 63600175 PHARM REV CODE 636 W HCPCS: Performed by: NURSE PRACTITIONER

## 2025-04-03 PROCEDURE — C1751 CATH, INF, PER/CENT/MIDLINE: HCPCS

## 2025-04-03 PROCEDURE — 85025 COMPLETE CBC W/AUTO DIFF WBC: CPT | Performed by: NURSE PRACTITIONER

## 2025-04-03 RX ORDER — SODIUM CHLORIDE 0.9 % (FLUSH) 0.9 %
10 SYRINGE (ML) INJECTION EVERY 12 HOURS PRN
Status: DISCONTINUED | OUTPATIENT
Start: 2025-04-03 | End: 2025-04-07 | Stop reason: HOSPADM

## 2025-04-03 RX ORDER — ACETAMINOPHEN 10 MG/ML
1000 INJECTION, SOLUTION INTRAVENOUS EVERY 8 HOURS
Status: COMPLETED | OUTPATIENT
Start: 2025-04-03 | End: 2025-04-04

## 2025-04-03 RX ADMIN — HYDRALAZINE HYDROCHLORIDE 10 MG: 20 INJECTION INTRAMUSCULAR; INTRAVENOUS at 11:04

## 2025-04-03 RX ADMIN — MUPIROCIN: 20 OINTMENT TOPICAL at 09:04

## 2025-04-03 RX ADMIN — HYDROMORPHONE HYDROCHLORIDE 1 MG: 2 INJECTION, SOLUTION INTRAMUSCULAR; INTRAVENOUS; SUBCUTANEOUS at 04:04

## 2025-04-03 RX ADMIN — AMIODARONE HYDROCHLORIDE 0.5 MG/MIN: 1.8 INJECTION, SOLUTION INTRAVENOUS at 05:04

## 2025-04-03 RX ADMIN — MUPIROCIN: 20 OINTMENT TOPICAL at 11:04

## 2025-04-03 RX ADMIN — LORAZEPAM 0.25 MG: 2 INJECTION INTRAMUSCULAR; INTRAVENOUS at 12:04

## 2025-04-03 RX ADMIN — AMIODARONE HYDROCHLORIDE 0.5 MG/MIN: 1.8 INJECTION, SOLUTION INTRAVENOUS at 04:04

## 2025-04-03 RX ADMIN — ENOXAPARIN SODIUM 40 MG: 40 INJECTION SUBCUTANEOUS at 04:04

## 2025-04-03 RX ADMIN — LORAZEPAM 0.25 MG: 2 INJECTION INTRAMUSCULAR; INTRAVENOUS at 06:04

## 2025-04-03 RX ADMIN — ACETAMINOPHEN 1000 MG: 10 INJECTION INTRAVENOUS at 09:04

## 2025-04-03 RX ADMIN — ACETAMINOPHEN 1000 MG: 10 INJECTION INTRAVENOUS at 05:04

## 2025-04-03 RX ADMIN — SODIUM CHLORIDE, POTASSIUM CHLORIDE, SODIUM LACTATE AND CALCIUM CHLORIDE: 600; 310; 30; 20 INJECTION, SOLUTION INTRAVENOUS at 11:04

## 2025-04-03 RX ADMIN — ACETAMINOPHEN 1000 MG: 10 INJECTION INTRAVENOUS at 11:04

## 2025-04-03 RX ADMIN — IOHEXOL 15 ML: 300 INJECTION, SOLUTION INTRAVENOUS at 06:04

## 2025-04-03 RX ADMIN — HYDROMORPHONE HYDROCHLORIDE 1 MG: 2 INJECTION, SOLUTION INTRAMUSCULAR; INTRAVENOUS; SUBCUTANEOUS at 05:04

## 2025-04-03 NOTE — PROGRESS NOTES
[facility]   Surgical Oncology  Progress Note    Subjective:   HPI:  This is an 79-year-old male with distal esophageal adenocarcinoma status post neoadjuvant chemoradiation.  PET-CT shows localized disease without evidence of distant metastasis, repeat endoscopy shows persistent disease.      Interval History:  Patient is postop day 3. Tolerating trickle feeds. Heart rate and rhythm controlled. Pending results of esophagram.     Post-Op Info:  Procedure(s) (LRB):  ROBOTIC ESOPHAGECTOMY, TRANSHIATAL (N/A)   3 Days Post-Op      Medications:  Continuous Infusions:   amiodarone in dextrose 5%  0.5 mg/min Intravenous Continuous 16.7 mL/hr at 04/03/25 0547 0.5 mg/min at 04/03/25 0547    lactated ringers   Intravenous Continuous 75 mL/hr at 04/03/25 0126 Rate Verify at 04/03/25 0126     Scheduled Meds:   enoxparin  40 mg Subcutaneous Daily    mupirocin   Nasal BID     PRN Meds:    Current Facility-Administered Medications:     diphenhydrAMINE, 12.5 mg, Intravenous, Q4H PRN    hydrALAZINE, 10 mg, Intravenous, Q4H PRN    HYDROmorphone, 1 mg, Intravenous, Q2H PRN    lorazepam, 0.25 mg, Intravenous, Q6H PRN    metoprolol, 5 mg, Intravenous, Q2H PRN    naloxone, 0.02 mg, Intravenous, PRN    ondansetron, 4 mg, Intravenous, Q12H PRN    prochlorperazine, 5 mg, Intravenous, Q6H PRN     Objective:     Vital Signs (Most Recent):  Temp: 97.4 °F (36.3 °C) (04/03/25 0748)  Pulse: 83 (04/03/25 0748)  Resp: 20 (04/03/25 0748)  BP: (!) 169/87 (04/03/25 0748)  SpO2: (!) 90 % (04/03/25 0748) Vital Signs (24h Range):  Temp:  [97.4 °F (36.3 °C)-98.5 °F (36.9 °C)] 97.4 °F (36.3 °C)  Pulse:  [83-96] 83  Resp:  [17-20] 20  SpO2:  [90 %-97 %] 90 %  BP: (149-169)/(67-94) 169/87       Intake/Output Summary (Last 24 hours) at 4/3/2025 0753  Last data filed at 4/3/2025 0500  Gross per 24 hour   Intake 553.8 ml   Output 1542.5 ml   Net -988.7 ml       Physical Exam  Constitutional:       General: He is not in acute  distress.  HENT:      Head: Atraumatic.      Nose: Nose normal.      Mouth/Throat:      Mouth: Mucous membranes are moist.   Eyes:      General: No scleral icterus.     Extraocular Movements: Extraocular movements intact.      Conjunctiva/sclera: Conjunctivae normal.   Neck:      Comments: Left lateral incision approximated with staples. Clean dressing covering incision TOM with minimal serosanguineous output  Cardiovascular:      Rate and Rhythm: Normal rate and regular rhythm.      Pulses: Normal pulses.      Heart sounds: Normal heart sounds.   Pulmonary:      Effort: Pulmonary effort is normal. No respiratory distress.      Breath sounds: Normal breath sounds.      Comments: Nasal cannula 2 L  Abdominal:      General: Bowel sounds are normal.      Palpations: Abdomen is soft.      Comments: Upper midline incision approximated, lap site x5 approximated   Musculoskeletal:         General: No swelling.      Cervical back: Normal range of motion and neck supple. No rigidity.   Skin:     General: Skin is warm and dry.   Neurological:      General: No focal deficit present.      Mental Status: He is alert and oriented to person, place, and time.   Psychiatric:         Mood and Affect: Mood normal.         Behavior: Behavior normal.         Significant Labs:  BMP:   Recent Labs   Lab 04/02/25  0401      K 4.0      CO2 25   BUN 10.7   CREATININE 0.73   CALCIUM 8.3*   MG 1.80     CBC:   Recent Labs   Lab 04/02/25  0401   WBC 9.86   RBC 3.97*   HGB 13.0*   HCT 38.7*      MCV 97.5*   MCH 32.7*   MCHC 33.6       Significant Diagnostics:    No new imaging for review    Assessment/Plan:     Distal esophageal adenocarcinoma status post neoadjuvant therapy.  Now status post transhiatal esophagectomy on 03/31/2025.    Atrial fibrillation with RVR  - cardiology following. On amiodarone     Plan:   - tolerated trickle feeds. Increase to goal rate 70 mL/hr isosource, 45 mL/hr free water   - CT esophagram pending        Rebecca Woo NP  Surgical Oncology  [facility]

## 2025-04-03 NOTE — CONSULTS
Inpatient Nutrition Assessment    Admit Date: 3/31/2025   Total duration of encounter: 3 days   Patient Age: 79 y.o.    Nutrition Recommendation/Prescription     Continue TF as tolerated.   TF recs:  Isosource 1.5, start at 10 mL/hr and advance as tolerated to goal rate of 70 mL/hr. At goal, TF will provide:  2520 kcal (100% est needs)  114 gm protein (100% est needs)  1277 mL free water (53% est needs)  FWF: 45 mL/hr that TF is running to better meet fluid needs  **based on a 24 hr per day run time**  Oral diet once medically feasible per MD.    Communication of Recommendations: reviewed with patient and reviewed with family    Nutrition Assessment     Malnutrition Assessment/Nutrition-Focused Physical Exam      Malnutrition Context: chronic illness (04/03/25 1303)  Malnutrition Level: moderate (04/03/25 1303)     Weight Loss (Malnutrition): other (see comments) (Does not meet criteria) (04/03/25 1303)  Subcutaneous Fat (Malnutrition): mild depletion (04/03/25 1303)  Orbital Region (Subcutaneous Fat Loss): mild depletion        Muscle Mass (Malnutrition): mild depletion (04/03/25 1303)  Caodaism Region (Muscle Loss): mild depletion                                A minimum of two characteristics is recommended for diagnosis of either severe or non-severe malnutrition.    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  Now status post transhiatal esophagectomy on 03/31/2025.    Atrial fibrillation with RVR    Relevant Medical History: Esophageal Carcinoma, CAD, GERD, HTN, Dyslipidemia     Scheduled Medications:  acetaminophen, 1,000 mg, Q8H  enoxparin, 40 mg, Daily  mupirocin, , BID    Continuous  "Infusions:  amiodarone in dextrose 5%, Last Rate: 0.5 mg/min (04/03/25 0547)  lactated ringers, Last Rate: 50 mL/hr at 04/03/25 1107    PRN Medications:  diphenhydrAMINE, 12.5 mg, Q4H PRN  hydrALAZINE, 10 mg, Q4H PRN  HYDROmorphone, 1 mg, Q2H PRN  lorazepam, 0.25 mg, Q6H PRN  metoprolol, 5 mg, Q2H PRN  naloxone, 0.02 mg, PRN  ondansetron, 4 mg, Q12H PRN  prochlorperazine, 5 mg, Q6H PRN    Calorie Containing IV Medications: no significant kcals from medications at this time    Recent Labs   Lab 03/31/25  1314 04/01/25  0413 04/02/25  0401 04/03/25  0750   NA  --  139 138 136   K  --  4.4 4.0 3.7   CALCIUM  --  8.8 8.3* 8.2*   MG  --  1.90 1.80 2.00   CL  --  106 105 103   CO2  --  20* 25 26   BUN  --  12.2 10.7 9.7   CREATININE  --  0.84 0.73 0.69*   EGFRNORACEVR  --  >60 >60 >60   GLUCOSE  --  130* 118* 118*   WBC 4.81 10.39 9.86 6.65   HGB 12.0* 13.6* 13.0* 12.3*   HCT 37.7* 40.6* 38.7* 37.8*     Nutrition Orders:  Diet NPO  Tube Feedings/Formulas 70; Isosource 1.5; Jejunostomy; 45    Appetite/Oral Intake: NPO/NPO  Factors Affecting Nutritional Intake: NPO  Social Needs Impacting Access to Food: none identified  Food/Yazidism/Cultural Preferences: none reported  Food Allergies: no known food allergies  Last Bowel Movement: 03/31/25  Wound(s):  surgical incision     Comments    4/2/25: MD consult for TF recs over 24 hrs; pt had J-tube placed on Monday; currently NPO with NG tube to suction. Per family, pt has been eating ok prior to surgery; had lost some weight during chemo but weight is almost back up to usual wt.    4/3/25: Pt tolerating TF at 10 mL/hr thus far.     Anthropometrics    Height: 6' 3" (190.5 cm), Height Method: Stated  Last Weight: 80.2 kg (176 lb 12.9 oz) (04/02/25 1256), Weight Method: Standard Scale  BMI (Calculated): 22.1  BMI Classification: normal (BMI 18.5-24.9)        Ideal Body Weight (IBW), Male: 196 lb     % Ideal Body Weight, Male (lb): 90.21 %                 Usual Body Weight (UBW), " k.8 kg  % Usual Body Weight: 98.25  % Weight Change From Usual Weight: -1.96 %  Usual Weight Provided By: family/caregiver    Wt Readings from Last 5 Encounters:   25 80.2 kg (176 lb 12.9 oz)   25 83 kg (183 lb)   25 77.6 kg (171 lb 1.6 oz)   24 79 kg (174 lb 3.2 oz)   24 79.7 kg (175 lb 9.6 oz)     Weight Change(s) Since Admission:   Wt Readings from Last 1 Encounters:   25 1256 80.2 kg (176 lb 12.9 oz)   25 0516 80.2 kg (176 lb 12.9 oz)   25 0553 80.2 kg (176 lb 12.9 oz)   25 0946 83.9 kg (185 lb)   Admit Weight: 83.9 kg (185 lb) (25 0946), Weight Method: Standard Scale    Estimated Needs    Weight Used For Calorie Calculations: 80.2 kg (176 lb 12.9 oz)  Energy Calorie Requirements (kcal): 6513-5140 kcal (30-35 kcal/kg)  Energy Need Method: Kcal/kg  Weight Used For Protein Calculations: 80.2 kg (176 lb 12.9 oz)  Protein Requirements: 113 gm (1.4g/kg)  Fluid Requirements (mL): 2406 mL        Enteral Nutrition     Formula: Isosource 1.5 Jose M  Rate/Volume: 10 mL/hr  Water Flushes: 45 mL/hr   Additives/Modulars: none at this time  Route: jejunostomy tube  Method: continuous  Total Nutrition Provided by Tube Feeding, Additives, and Flushes:  Calories Provided  1262 kcal/d, 52% needs   Protein Provided  16 g/d, 14% needs   Fluid Provided  1262 ml/d, 52% needs   Continuous feeding calculations based on estimated 20 hr/d run time unless otherwise stated.    Parenteral Nutrition     Patient not receiving parenteral nutrition support at this time.    Evaluation of Received Nutrient Intake    Calories: not meeting estimated needs  Protein: not meeting estimated needs    Patient Education     Not applicable.    Nutrition Diagnosis     PES: Inadequate oral intake related to altered GI function as evidenced by NPO since admit. (active)     PES: Moderate chronic disease or condition related malnutrition related to chronic illness as Evidenced by:  - muscle mass  depletion: 1 area of mild muscle loss (Temporalis) - loss of subcutaneous fat: 1 area of mild fat loss (Infraorbital) new    Nutrition Interventions     Intervention(s): modified composition of enteral nutrition, modified rate of enteral nutrition, and collaboration with other providers  Intervention(s): Enteral nutrition    Goal: Meet greater than 80% of nutritional needs by follow-up. (goal progressing)  Goal: Maintain weight throughout hospitalization. (goal progressing)    Nutrition Goals & Monitoring     Dietitian will monitor: enteral nutrition intake and weight  Discharge planning: tube feeding (Isosource 1.5, goal rate of 70mL/hr)  Nutrition Risk/Follow-Up: high (follow-up in 1-4 days)   Please consult if re-assessment needed sooner.

## 2025-04-03 NOTE — PROCEDURES
"Marcelino Al is a 79 y.o. male patient.    Temp: 97.7 °F (36.5 °C) (04/03/25 1123)  Pulse: 99 (04/03/25 1208)  Resp: 20 (04/03/25 1123)  BP: (!) 129/93 (04/03/25 1208)  SpO2: 95 % (04/03/25 1208)  Weight: 80.2 kg (176 lb 12.9 oz) (04/02/25 1256)  Height: 6' 3" (190.5 cm) (04/02/25 1256)    PICC  Date/Time: 4/3/2025 3:39 PM  Performed by: Marjorie Fischer RN  Time out: Immediately prior to procedure a time out was called to verify the correct patient, procedure, equipment, support staff and site/side marked as required  Indications: med administration and vascular access  Anesthesia: local infiltration  Local anesthetic: lidocaine 1% without epinephrine    Preparation: skin prepped with ChloraPrep  Skin prep agent dried: skin prep agent completely dried prior to procedure  Sterile barriers: all five maximum sterile barriers used - cap, mask, sterile gown, sterile gloves, and large sterile sheet  Hand hygiene: hand hygiene performed prior to central venous catheter insertion  Location details: left brachial  Catheter type: single lumen  Catheter size: 4 Fr  Catheter Length: 17cm    Ultrasound guidance: yes  Vessel Caliber: patentNeedle advanced into vessel with real time Ultrasound guidance.  Guidewire confirmed in vessel.  Sterile sheath used.  Number of attempts: 1  Post-procedure: blood return through all ports, chlorhexidine patch and sterile dressing applied            Name TREVER Fischer RN   4/3/2025    "

## 2025-04-03 NOTE — PLAN OF CARE
Received a call from Kike at [facility]. She [facility] to [facility]bi [facility] and was told an infusion company would not be needed. [facility]. Nurses here to do teaching and then  will assist with teaching at home. Awaiting confirmation from  and auth from [facility].  Kike is checking with the  intake for status of referral.     9:25am- referral is pending prior authorization.     10am-received message from Kiersten with [facility]  that they will   take pt. Bobbi with [facility] notified. She will call me back once finalized.

## 2025-04-03 NOTE — PROGRESS NOTES
[facility]    Cardiology  Progress Note    Patient Name: Marcelino Al  MRN: [id number]  Admission Date: 3/31/2025  Hospital Length of Stay: 3 days  Code Status: No Order   Attending Provider: Gabe Talbot MD   Consulting Provider: DANIEL Martin  Primary Care Physician: No primary care provider on file.  Principal Problem:GE junction carcinoma    Patient information was obtained from patient, past medical records, and ER records.     Subjective:   Chief Complaint/Reason for Consult: New Onset A-Fib    HPI: 79 y.o. male known to [facility], Dr. Maier, with a PMH of Esophageal Carcinoma, CAD, GERD, HTN, and Dyslipidemia who presented to [facility] on 3.31.25 for a scheduled transhiatal esophagectomy.  On 4.1.25 he was noted to be tachycardic and EKG confirmed Atrial Fibrillation.  [facility] was called and the patient was started on an Amiodarone Gtt per protocol with Bolus. H/H stable, K 4.0 and Mg 1.9.  [facility] was consulted for management of he new onset Atrial Fibrillation.    Hospital Course:  4.3.25: NAD Noted. NPO. SR. On Amiodarone Infusion.    PMH: Esophageal Carcinoma, CAD, GERD, HTN, Dyslipidemia  PSH:  Coronary Stent Placement (2003), Knee surgery  Family History: Mother: Leukemia  Social History:  No tobacco, ETOH, or illicit drug use    Previous Cardiac Diagnostics:   PET (3.19.25)  This is a normal perfusion study, no perfusion defects noted. There is no evidence of ischemia other than reduced BFR.   This scan is suggestive of low risk for future cardiovascular events.   The left ventricular cavity is noted to be normal on the stress studies. The stress left ventricular ejection fraction was calculated to be 79% and left ventricular global function is normal. The rest left ventricular cavity is noted to be normal. The rest left ventricular ejection fraction was calculated to be 76% and rest left ventricular global function is normal.   When compared to the resting ejection  fraction (76%), the stress ejection fraction (79%) has increased.   Transient ischemic dilatation is present and has been described as a marker for high risk coronary artery disease. It has also been described in microvascular disease, hypertensive heart disease as well as cardiac deconditioning.   The study quality is average.   Calcium scoring was not performed in this patient due to specific concerns that can affect accuracy.   There was a rise in myocardial blood flow between rest and stress. Global myocardial blood flow reserve was 1.77. Myocardial blood flow reserve is globally abnormal, placing the patient at a higher coronary event risk.     TTE (5.9.2024)  LV cavity size is normal  Mild Concentric LVH  Normal Global LV contractility.  LVEF 68%  Grade I DD.   LV filling pressures are estimated to be normal  No evidence of AS or AR  No MS.  Trace/Physiologic MR  No Pericardial Effusion    Review of patient's allergies indicates:  No Known Allergies  No current facility-administered medications on file prior to encounter.     Current Outpatient Medications on File Prior to Encounter   Medication Sig    aspirin (ECOTRIN) 81 MG EC tablet Take 81 mg by mouth every morning.    atorvastatin (LIPITOR) 80 MG tablet Take 80 mg by mouth every morning.    cholecalciferol, vitamin D3, (VITAMIN D3) 50 mcg (2,000 unit) Cap capsule Take 2,000 Units by mouth once daily.    cyanocobalamin (VITAMIN B-12) 1000 MCG tablet Take 1,000 mcg by mouth every morning.    ezetimibe (ZETIA) 10 mg tablet Take 10 mg by mouth every morning.    LIDOcaine-prilocaine (EMLA) cream Apply topically as needed (apply to [facility] site 30 minutes prior to chemotherapy).    losartan (COZAAR) 25 MG tablet Take 25 mg by mouth 2 (two) times a day.    multivitamin-minerals-lutein (MULTIVITAMIN 50 PLUS) Tab Take 1 tablet by mouth every morning.    ondansetron (ZOFRAN) 8 MG tablet Take 1 tablet (8 mg total) by mouth every 8 (eight) hours as needed.     pantoprazole (PROTONIX) 40 MG tablet Take 40 mg by mouth every morning.    prochlorperazine (COMPAZINE) 10 MG tablet Take 1 tablet (10 mg total) by mouth every 8 (eight) hours as needed (nausea).    traMADoL (ULTRAM) 50 mg tablet Take 1 tablet by mouth daily as needed.    traZODone (DESYREL) 100 MG tablet Take 100 mg by mouth every evening.    amLODIPine (NORVASC) 5 MG tablet Take 5 mg by mouth every evening.     Review of Systems   Respiratory:  Negative for chest tightness and shortness of breath.    Cardiovascular:  Negative for chest pain and palpitations.   All other systems reviewed and are negative.    Objective:     Vital Signs (Most Recent):  Temp: 97.7 °F (36.5 °C) (04/03/25 1123)  Pulse: 99 (04/03/25 1208)  Resp: 20 (04/03/25 1123)  BP: (!) 129/93 (04/03/25 1208)  SpO2: 95 % (04/03/25 1208) Vital Signs (24h Range):  Temp:  [97.4 °F (36.3 °C)-98.5 °F (36.9 °C)] 97.7 °F (36.5 °C)  Pulse:  [83-99] 99  Resp:  [17-20] 20  SpO2:  [90 %-97 %] 95 %  BP: (129-207)/(67-94) 129/93   Weight: 80.2 kg (176 lb 12.9 oz)  Body mass index is 22.1 kg/m².  SpO2: 95 %       Intake/Output Summary (Last 24 hours) at 4/3/2025 1232  Last data filed at 4/3/2025 0500  Gross per 24 hour   Intake 553.8 ml   Output 1542.5 ml   Net -988.7 ml     Lines/Drains/Airways       Central Venous Catheter Line  Duration                  PowerPort A Cath Single Lumen 10/21/24 1031 Internal Jugular Right 164 days              Drain  Duration                  Closed/Suction Drain 03/31/25 Tube - 1 Neck Bulb 3 days         Gastrostomy/Enterostomy 03/31/25 Jejunostomy tube 3 days         NG/OG Tube 03/31/25 1120 18 Fr. Right nostril 3 days              Peripheral Intravenous Line  Duration                  Peripheral IV - Single Lumen 04/01/25 2320 20 G Right Antecubital 1 day         Peripheral IV - Single Lumen 04/01/25 2321 20 G Anterior;Left Forearm 1 day                  Significant Labs:   Chemistries:   Recent Labs   Lab 04/01/25  5765  04/02/25  0401 04/03/25  0750    138 136   K 4.4 4.0 3.7    105 103   CO2 20* 25 26   BUN 12.2 10.7 9.7   CREATININE 0.84 0.73 0.69*   CALCIUM 8.8 8.3* 8.2*   GLUCOSE 130* 118* 118*   MG 1.90 1.80 2.00        CBC/Anemia Labs: Coags:    Recent Labs   Lab 04/01/25  0413 04/02/25  0401 04/03/25  0750   WBC 10.39 9.86 6.65   HGB 13.6* 13.0* 12.3*   HCT 40.6* 38.7* 37.8*    135 134   MCV 96.7* 97.5* 97.7*   RDW 12.3 12.7 12.3    Recent Labs   Lab 03/31/25  1314   INR 1.3   APTT 27.0        Telemetry:  SR    Physical Exam  Vitals and nursing note reviewed.   Constitutional:       General: He is not in acute distress.  Neck:      Comments: Anterior Neck Dressing with TOM Drain  Cardiovascular:      Rate and Rhythm: Normal rate and regular rhythm.   Pulmonary:      Effort: Pulmonary effort is normal. No respiratory distress.   Neurological:      Mental Status: Mental status is at baseline.       Home Medications:   Medications Ordered Prior to Encounter[1]  Current Schedule Inpatient Medications:   acetaminophen  1,000 mg Intravenous Q8H    enoxparin  40 mg Subcutaneous Daily    mupirocin   Nasal BID     Continuous Infusions:   amiodarone in dextrose 5%  0.5 mg/min Intravenous Continuous 16.7 mL/hr at 04/03/25 0547 0.5 mg/min at 04/03/25 0547    lactated ringers   Intravenous Continuous 50 mL/hr at 04/03/25 1107 New Bag at 04/03/25 1107     Assessment:   New Onset Atrial Fibrillation-- Now SR      -ChadsVasc: 4  Distal Esophageal Adenocarcinoma s/p Transhiatal Esophagectomy 3.31.25  CAD s/p PCI 2003      -Normal PET 3/2025    - EF 68%  Hypertension (Above Goal)  Dyslipidemia  Anemia (Stable)    Plan:   Continue Amiodarone Infusion at Set Rate 0.5 Mg/Min  Resume Full Anticoagulation when ok with Surgical Team  PRN IV Antihypertensives   Continue Tele  Plan transition to Oral Medications when able.  Will follow     DANIEL Martin  Cardiology  [facility]          [1]   No  current facility-administered medications on file prior to encounter.     Current Outpatient Medications on File Prior to Encounter   Medication Sig Dispense Refill    aspirin (ECOTRIN) 81 MG EC tablet Take 81 mg by mouth every morning.      atorvastatin (LIPITOR) 80 MG tablet Take 80 mg by mouth every morning.      cholecalciferol, vitamin D3, (VITAMIN D3) 50 mcg (2,000 unit) Cap capsule Take 2,000 Units by mouth once daily.      cyanocobalamin (VITAMIN B-12) 1000 MCG tablet Take 1,000 mcg by mouth every morning.      ezetimibe (ZETIA) 10 mg tablet Take 10 mg by mouth every morning.      LIDOcaine-prilocaine (EMLA) cream Apply topically as needed (apply to [facility] site 30 minutes prior to chemotherapy). 30 g 2    losartan (COZAAR) 25 MG tablet Take 25 mg by mouth 2 (two) times a day.      multivitamin-minerals-lutein (MULTIVITAMIN 50 PLUS) Tab Take 1 tablet by mouth every morning.      ondansetron (ZOFRAN) 8 MG tablet Take 1 tablet (8 mg total) by mouth every 8 (eight) hours as needed. 60 tablet 6    pantoprazole (PROTONIX) 40 MG tablet Take 40 mg by mouth every morning.      prochlorperazine (COMPAZINE) 10 MG tablet Take 1 tablet (10 mg total) by mouth every 8 (eight) hours as needed (nausea). 30 tablet 1    traMADoL (ULTRAM) 50 mg tablet Take 1 tablet by mouth daily as needed.      traZODone (DESYREL) 100 MG tablet Take 100 mg by mouth every evening.      amLODIPine (NORVASC) 5 MG tablet Take 5 mg by mouth every evening.  2

## 2025-04-04 LAB
ABO + RH BLD: NORMAL
ANION GAP SERPL CALC-SCNC: 9 MEQ/L
BASOPHILS # BLD AUTO: 0.02 X10(3)/MCL
BASOPHILS NFR BLD AUTO: 0.3 %
BLD PROD TYP BPU: NORMAL
BLOOD UNIT EXPIRATION DATE: NORMAL
BLOOD UNIT TYPE CODE: 5100
BUN SERPL-MCNC: 9 MG/DL (ref 8.4–25.7)
CALCIUM SERPL-MCNC: 8.5 MG/DL (ref 8.8–10)
CHLORIDE SERPL-SCNC: 102 MMOL/L (ref 98–107)
CO2 SERPL-SCNC: 27 MMOL/L (ref 23–31)
CREAT SERPL-MCNC: 0.73 MG/DL (ref 0.72–1.25)
CREAT/UREA NIT SERPL: 12
CROSSMATCH INTERPRETATION: NORMAL
DISPENSE STATUS: NORMAL
EOSINOPHIL # BLD AUTO: 0.2 X10(3)/MCL (ref 0–0.9)
EOSINOPHIL NFR BLD AUTO: 3.1 %
ERYTHROCYTE [DISTWIDTH] IN BLOOD BY AUTOMATED COUNT: 12.3 % (ref 11.5–17)
GFR SERPLBLD CREATININE-BSD FMLA CKD-EPI: >60 ML/MIN/1.73/M2
GLUCOSE SERPL-MCNC: 136 MG/DL (ref 82–115)
HCT VFR BLD AUTO: 34.8 % (ref 42–52)
HGB BLD-MCNC: 11.8 G/DL (ref 14–18)
IMM GRANULOCYTES # BLD AUTO: 0.02 X10(3)/MCL (ref 0–0.04)
IMM GRANULOCYTES NFR BLD AUTO: 0.3 %
LYMPHOCYTES # BLD AUTO: 0.44 X10(3)/MCL (ref 0.6–4.6)
LYMPHOCYTES NFR BLD AUTO: 6.9 %
MAGNESIUM SERPL-MCNC: 2 MG/DL (ref 1.6–2.6)
MCH RBC QN AUTO: 32.5 PG (ref 27–31)
MCHC RBC AUTO-ENTMCNC: 33.9 G/DL (ref 33–36)
MCV RBC AUTO: 95.9 FL (ref 80–94)
MONOCYTES # BLD AUTO: 0.68 X10(3)/MCL (ref 0.1–1.3)
MONOCYTES NFR BLD AUTO: 10.7 %
NEUTROPHILS # BLD AUTO: 5.01 X10(3)/MCL (ref 2.1–9.2)
NEUTROPHILS NFR BLD AUTO: 78.7 %
NRBC BLD AUTO-RTO: 0 %
OHS QRS DURATION: 90 MS
OHS QTC CALCULATION: 481 MS
PLATELET # BLD AUTO: 140 X10(3)/MCL (ref 130–400)
PMV BLD AUTO: 9.9 FL (ref 7.4–10.4)
POTASSIUM SERPL-SCNC: 3.6 MMOL/L (ref 3.5–5.1)
RBC # BLD AUTO: 3.63 X10(6)/MCL (ref 4.7–6.1)
SODIUM SERPL-SCNC: 138 MMOL/L (ref 136–145)
UNIT NUMBER: NORMAL
WBC # BLD AUTO: 6.37 X10(3)/MCL (ref 4.5–11.5)

## 2025-04-04 PROCEDURE — 25000003 PHARM REV CODE 250: Performed by: NURSE PRACTITIONER

## 2025-04-04 PROCEDURE — 11000001 HC ACUTE MED/SURG PRIVATE ROOM

## 2025-04-04 PROCEDURE — 99024 POSTOP FOLLOW-UP VISIT: CPT | Mod: ,,, | Performed by: NURSE PRACTITIONER

## 2025-04-04 PROCEDURE — 93010 ELECTROCARDIOGRAM REPORT: CPT | Mod: ,,, | Performed by: INTERNAL MEDICINE

## 2025-04-04 PROCEDURE — 94760 N-INVAS EAR/PLS OXIMETRY 1: CPT

## 2025-04-04 PROCEDURE — 63600175 PHARM REV CODE 636 W HCPCS: Performed by: SURGERY

## 2025-04-04 PROCEDURE — 85025 COMPLETE CBC W/AUTO DIFF WBC: CPT | Performed by: NURSE PRACTITIONER

## 2025-04-04 PROCEDURE — 83735 ASSAY OF MAGNESIUM: CPT | Performed by: NURSE PRACTITIONER

## 2025-04-04 PROCEDURE — 63600175 PHARM REV CODE 636 W HCPCS: Performed by: NURSE PRACTITIONER

## 2025-04-04 PROCEDURE — 25000003 PHARM REV CODE 250: Performed by: SURGERY

## 2025-04-04 PROCEDURE — 99900035 HC TECH TIME PER 15 MIN (STAT)

## 2025-04-04 PROCEDURE — 80048 BASIC METABOLIC PNL TOTAL CA: CPT | Performed by: NURSE PRACTITIONER

## 2025-04-04 PROCEDURE — 99900031 HC PATIENT EDUCATION (STAT)

## 2025-04-04 PROCEDURE — 36415 COLL VENOUS BLD VENIPUNCTURE: CPT | Performed by: NURSE PRACTITIONER

## 2025-04-04 PROCEDURE — 93005 ELECTROCARDIOGRAM TRACING: CPT

## 2025-04-04 PROCEDURE — 94799 UNLISTED PULMONARY SVC/PX: CPT | Mod: XB

## 2025-04-04 RX ORDER — AMIODARONE HYDROCHLORIDE 200 MG/1
400 TABLET ORAL 2 TIMES DAILY
Status: COMPLETED | OUTPATIENT
Start: 2025-04-04 | End: 2025-04-06

## 2025-04-04 RX ORDER — ALPRAZOLAM 0.25 MG/1
0.25 TABLET ORAL 3 TIMES DAILY PRN
Status: DISCONTINUED | OUTPATIENT
Start: 2025-04-04 | End: 2025-04-05

## 2025-04-04 RX ORDER — METOCLOPRAMIDE 10 MG/1
10 TABLET ORAL
Status: DISCONTINUED | OUTPATIENT
Start: 2025-04-04 | End: 2025-04-04

## 2025-04-04 RX ORDER — AMIODARONE HYDROCHLORIDE 200 MG/1
200 TABLET ORAL 2 TIMES DAILY
Status: DISCONTINUED | OUTPATIENT
Start: 2025-04-07 | End: 2025-04-07 | Stop reason: HOSPADM

## 2025-04-04 RX ORDER — OXYCODONE HYDROCHLORIDE 5 MG/1
5 TABLET ORAL EVERY 6 HOURS PRN
Refills: 0 | Status: DISCONTINUED | OUTPATIENT
Start: 2025-04-04 | End: 2025-04-07 | Stop reason: HOSPADM

## 2025-04-04 RX ORDER — AMIODARONE HYDROCHLORIDE 200 MG/1
200 TABLET ORAL DAILY
Status: DISCONTINUED | OUTPATIENT
Start: 2025-04-10 | End: 2025-04-07 | Stop reason: HOSPADM

## 2025-04-04 RX ORDER — METOCLOPRAMIDE HYDROCHLORIDE 5 MG/5ML
10 SOLUTION ORAL ONCE
Status: DISCONTINUED | OUTPATIENT
Start: 2025-04-04 | End: 2025-04-04

## 2025-04-04 RX ADMIN — ALPRAZOLAM 0.25 MG: 0.25 TABLET ORAL at 01:04

## 2025-04-04 RX ADMIN — AMIODARONE HYDROCHLORIDE 400 MG: 200 TABLET ORAL at 01:04

## 2025-04-04 RX ADMIN — HYDROMORPHONE HYDROCHLORIDE 1 MG: 2 INJECTION, SOLUTION INTRAMUSCULAR; INTRAVENOUS; SUBCUTANEOUS at 05:04

## 2025-04-04 RX ADMIN — ENOXAPARIN SODIUM 40 MG: 40 INJECTION SUBCUTANEOUS at 04:04

## 2025-04-04 RX ADMIN — OXYCODONE HYDROCHLORIDE 5 MG: 5 TABLET ORAL at 04:04

## 2025-04-04 RX ADMIN — MUPIROCIN: 20 OINTMENT TOPICAL at 10:04

## 2025-04-04 RX ADMIN — ALPRAZOLAM 0.25 MG: 0.25 TABLET ORAL at 07:04

## 2025-04-04 RX ADMIN — METOCLOPRAMIDE 10 MG: 10 TABLET ORAL at 04:04

## 2025-04-04 RX ADMIN — AMIODARONE HYDROCHLORIDE 400 MG: 200 TABLET ORAL at 09:04

## 2025-04-04 RX ADMIN — PIPERACILLIN SODIUM AND TAZOBACTAM SODIUM 4.5 G: 4; .5 INJECTION, POWDER, LYOPHILIZED, FOR SOLUTION INTRAVENOUS at 10:04

## 2025-04-04 RX ADMIN — DOXYCYCLINE 100 MG: 100 INJECTION, POWDER, LYOPHILIZED, FOR SOLUTION INTRAVENOUS at 03:04

## 2025-04-04 RX ADMIN — HYDROMORPHONE HYDROCHLORIDE 1 MG: 2 INJECTION, SOLUTION INTRAMUSCULAR; INTRAVENOUS; SUBCUTANEOUS at 02:04

## 2025-04-04 RX ADMIN — MUPIROCIN: 20 OINTMENT TOPICAL at 09:04

## 2025-04-04 RX ADMIN — AMIODARONE HYDROCHLORIDE 0.5 MG/MIN: 1.8 INJECTION, SOLUTION INTRAVENOUS at 05:04

## 2025-04-04 RX ADMIN — ACETAMINOPHEN 1000 MG: 10 INJECTION INTRAVENOUS at 05:04

## 2025-04-04 NOTE — PROGRESS NOTES
[facility]   Surgical Oncology  Progress Note    Subjective:   HPI:  This is an 79-year-old male with distal esophageal adenocarcinoma status post neoadjuvant chemoradiation.  PET-CT shows localized disease without evidence of distant metastasis, repeat endoscopy shows persistent disease.      Interval History:  Patient is postop day 4. Tolerating J-tube feeding.  CT esophagram without leak.  Clear liquid diet started yesterday, tolerating well.  There is new onset erythema and edema to left upper extremity around midline.  Possible infiltration of amiodarone.    Post-Op Info:  Procedure(s) (LRB):  ROBOTIC ESOPHAGECTOMY, TRANSHIATAL (N/A)   4 Days Post-Op      Medications:  Continuous Infusions:      Scheduled Meds:   amiodarone  400 mg Oral BID    Followed by    [START ON 4/7/2025] amiodarone  200 mg Oral BID    Followed by    [START ON 4/10/2025] amiodarone  200 mg Oral Daily    enoxparin  40 mg Subcutaneous Daily    metoclopramide HCl  10 mg Oral Once    mupirocin   Nasal BID     PRN Meds:    Current Facility-Administered Medications:     ALPRAZolam, 0.25 mg, Oral, TID PRN    diphenhydrAMINE, 12.5 mg, Intravenous, Q4H PRN    hydrALAZINE, 10 mg, Intravenous, Q4H PRN    metoprolol, 5 mg, Intravenous, Q2H PRN    naloxone, 0.02 mg, Intravenous, PRN    ondansetron, 4 mg, Intravenous, Q12H PRN    prochlorperazine, 5 mg, Intravenous, Q6H PRN    Flushing PICC/Midline Protocol, , , Until Discontinued **AND** sodium chloride 0.9%, 10 mL, Intravenous, Q12H PRN     Objective:     Vital Signs (Most Recent):  Temp: 97.9 °F (36.6 °C) (04/04/25 0751)  Pulse: 94 (04/04/25 0751)  Resp: 20 (04/04/25 0751)  BP: (!) 158/69 (04/04/25 0751)  SpO2: (!) 91 % (04/04/25 0751) Vital Signs (24h Range):  Temp:  [97.6 °F (36.4 °C)-98.2 °F (36.8 °C)] 97.9 °F (36.6 °C)  Pulse:  [] 94  Resp:  [18-20] 20  SpO2:  [91 %-95 %] 91 %  BP: (129-168)/(69-93) 158/69       Intake/Output Summary (Last 24 hours) at  4/4/2025 1135  Last data filed at 4/4/2025 0400  Gross per 24 hour   Intake 0 ml   Output 1490 ml   Net -1490 ml       Physical Exam  Constitutional:       General: He is not in acute distress.  HENT:      Head: Atraumatic.      Nose: Nose normal.      Mouth/Throat:      Mouth: Mucous membranes are moist.   Eyes:      General: No scleral icterus.     Extraocular Movements: Extraocular movements intact.      Conjunctiva/sclera: Conjunctivae normal.   Neck:      Comments: Left lateral incision approximated with staples. Clean dressing covering incision TOM with minimal serosanguineous output  Cardiovascular:      Rate and Rhythm: Normal rate and regular rhythm.      Pulses: Normal pulses.      Heart sounds: Normal heart sounds.   Pulmonary:      Effort: Pulmonary effort is normal. No respiratory distress.      Breath sounds: Normal breath sounds.      Comments: Nasal cannula 2 L  Abdominal:      General: Bowel sounds are normal.      Palpations: Abdomen is soft.      Comments: Upper midline incision approximated, lap site x5 approximated   Musculoskeletal:         General: No swelling.      Cervical back: Normal range of motion and neck supple. No rigidity.   Skin:     General: Skin is warm and dry.   Neurological:      General: No focal deficit present.      Mental Status: He is alert and oriented to person, place, and time.   Psychiatric:         Mood and Affect: Mood normal.         Behavior: Behavior normal.         Significant Labs:  BMP:   Recent Labs   Lab 04/04/25  0418      K 3.6      CO2 27   BUN 9.0   CREATININE 0.73   CALCIUM 8.5*   MG 2.00     CBC:   Recent Labs   Lab 04/04/25  0418   WBC 6.37   RBC 3.63*   HGB 11.8*   HCT 34.8*      MCV 95.9*   MCH 32.5*   MCHC 33.9       Significant Diagnostics:    No new imaging for review    Assessment/Plan:     Distal esophageal adenocarcinoma status post neoadjuvant therapy.  Now status post transhiatal esophagectomy on 03/31/2025.    Atrial  fibrillation with RVR  - cardiology following. On amiodarone     Plan:   - continue to increase J-tube feedings as tolerated to goal   - Stop Ativan transitioned to Xanax p.r.n.   -We will discuss with Cardiology that okay for transitioned to p.o. meds   -Ultrasound left upper extremity rule out DVT   - remove TOM drain from left neck   Reglan x1 day      Rebecca Woo NP  Surgical Oncology  [facility]     Surgical Oncology coverage Dr. Talbot -   Patient feeling well.  Tolerating clears.  Concern exists for left arm - swollen with very slight exudate that is light cream/thin/watery.  There is swelling up to the midline area and mild erythema.  Agree with plans for duplex US.  Will plan to add doxycycline iv and also arm elevation.      Patient did pass bowel movement before time of visit.  Cont care as planned and advance diet in time with regaining of bowel function.

## 2025-04-04 NOTE — PROGRESS NOTES
[facility]    Cardiology  Progress Note    Patient Name: Marcelino Al  MRN: [id number]  Admission Date: 3/31/2025  Hospital Length of Stay: 4 days  Code Status: No Order   Attending Provider: Gabe Talbot MD   Consulting Provider: DANIEL Martin  Primary Care Physician: No primary care provider on file.  Principal Problem:GE junction carcinoma    Patient information was obtained from patient, past medical records, and ER records.     Subjective:   Chief Complaint/Reason for Consult: New Onset A-Fib    HPI: 79 y.o. male known to [facility], Dr. Maier, with a PMH of Esophageal Carcinoma, CAD, GERD, HTN, and Dyslipidemia who presented to [facility] on 3.31.25 for a scheduled transhiatal esophagectomy.  On 4.1.25 he was noted to be tachycardic and EKG confirmed Atrial Fibrillation.  [facility] was called and the patient was started on an Amiodarone Gtt per protocol with Bolus. H/H stable, K 4.0 and Mg 1.9.  [facility] was consulted for management of he new onset Atrial Fibrillation.    Hospital Course:  4.3.25: NAD Noted. NPO. SR. On Amiodarone Infusion.  4.4.25: NAD Noted. SR on Tele.    PMH: Esophageal Carcinoma, CAD, GERD, HTN, Dyslipidemia  PSH:  Coronary Stent Placement (2003), Knee surgery  Family History: Mother: Leukemia  Social History:  No tobacco, ETOH, or illicit drug use    Previous Cardiac Diagnostics:   PET (3.19.25)  This is a normal perfusion study, no perfusion defects noted. There is no evidence of ischemia other than reduced BFR.   This scan is suggestive of low risk for future cardiovascular events.   The left ventricular cavity is noted to be normal on the stress studies. The stress left ventricular ejection fraction was calculated to be 79% and left ventricular global function is normal. The rest left ventricular cavity is noted to be normal. The rest left ventricular ejection fraction was calculated to be 76% and rest left ventricular global function is normal.   When  compared to the resting ejection fraction (76%), the stress ejection fraction (79%) has increased.   Transient ischemic dilatation is present and has been described as a marker for high risk coronary artery disease. It has also been described in microvascular disease, hypertensive heart disease as well as cardiac deconditioning.   The study quality is average.   Calcium scoring was not performed in this patient due to specific concerns that can affect accuracy.   There was a rise in myocardial blood flow between rest and stress. Global myocardial blood flow reserve was 1.77. Myocardial blood flow reserve is globally abnormal, placing the patient at a higher coronary event risk.     TTE (5.9.2024)  LV cavity size is normal  Mild Concentric LVH  Normal Global LV contractility.  LVEF 68%  Grade I DD.   LV filling pressures are estimated to be normal  No evidence of AS or AR  No MS.  Trace/Physiologic MR  No Pericardial Effusion    Review of patient's allergies indicates:  No Known Allergies  No current facility-administered medications on file prior to encounter.     Current Outpatient Medications on File Prior to Encounter   Medication Sig    aspirin (ECOTRIN) 81 MG EC tablet Take 81 mg by mouth every morning.    atorvastatin (LIPITOR) 80 MG tablet Take 80 mg by mouth every morning.    cholecalciferol, vitamin D3, (VITAMIN D3) 50 mcg (2,000 unit) Cap capsule Take 2,000 Units by mouth once daily.    cyanocobalamin (VITAMIN B-12) 1000 MCG tablet Take 1,000 mcg by mouth every morning.    ezetimibe (ZETIA) 10 mg tablet Take 10 mg by mouth every morning.    LIDOcaine-prilocaine (EMLA) cream Apply topically as needed (apply to [facility] site 30 minutes prior to chemotherapy).    losartan (COZAAR) 25 MG tablet Take 25 mg by mouth 2 (two) times a day.    multivitamin-minerals-lutein (MULTIVITAMIN 50 PLUS) Tab Take 1 tablet by mouth every morning.    ondansetron (ZOFRAN) 8 MG tablet Take 1 tablet (8 mg total) by mouth every 8  (eight) hours as needed.    pantoprazole (PROTONIX) 40 MG tablet Take 40 mg by mouth every morning.    prochlorperazine (COMPAZINE) 10 MG tablet Take 1 tablet (10 mg total) by mouth every 8 (eight) hours as needed (nausea).    traMADoL (ULTRAM) 50 mg tablet Take 1 tablet by mouth daily as needed.    traZODone (DESYREL) 100 MG tablet Take 100 mg by mouth every evening.    amLODIPine (NORVASC) 5 MG tablet Take 5 mg by mouth every evening.     Review of Systems   Respiratory:  Negative for chest tightness and shortness of breath.    Cardiovascular:  Negative for chest pain and palpitations.   All other systems reviewed and are negative.    Objective:     Vital Signs (Most Recent):  Temp: 97.9 °F (36.6 °C) (04/04/25 0751)  Pulse: 94 (04/04/25 0751)  Resp: 20 (04/04/25 0751)  BP: (!) 158/69 (04/04/25 0751)  SpO2: (!) 91 % (04/04/25 0751) Vital Signs (24h Range):  Temp:  [97.6 °F (36.4 °C)-98.2 °F (36.8 °C)] 97.9 °F (36.6 °C)  Pulse:  [] 94  Resp:  [18-20] 20  SpO2:  [91 %-97 %] 91 %  BP: (129-207)/(69-93) 158/69   Weight: 80.2 kg (176 lb 12.9 oz)  Body mass index is 22.1 kg/m².  SpO2: (!) 91 %       Intake/Output Summary (Last 24 hours) at 4/4/2025 1048  Last data filed at 4/4/2025 0400  Gross per 24 hour   Intake 0 ml   Output 1490 ml   Net -1490 ml     Lines/Drains/Airways       Central Venous Catheter Line  Duration                  PowerPort A Cath Single Lumen 10/21/24 1031 Internal Jugular Right 165 days              Drain  Duration                  Closed/Suction Drain 03/31/25 Tube - 1 Neck Bulb 4 days         Gastrostomy/Enterostomy 03/31/25 Jejunostomy tube 4 days              Peripheral Intravenous Line  Duration                  Midline Catheter - Single Lumen 04/03/25 1538 Left brachial vein <1 day                  Significant Labs:   Chemistries:   Recent Labs   Lab 04/01/25  0413 04/02/25  0401 04/03/25  0750 04/04/25  0418    138 136 138   K 4.4 4.0 3.7 3.6    105 103 102   CO2 20* 25 26  27   BUN 12.2 10.7 9.7 9.0   CREATININE 0.84 0.73 0.69* 0.73   CALCIUM 8.8 8.3* 8.2* 8.5*   GLUCOSE 130* 118* 118* 136*   MG 1.90 1.80 2.00 2.00        CBC/Anemia Labs: Coags:    Recent Labs   Lab 04/02/25  0401 04/03/25  0750 04/04/25  0418   WBC 9.86 6.65 6.37   HGB 13.0* 12.3* 11.8*   HCT 38.7* 37.8* 34.8*    134 140   MCV 97.5* 97.7* 95.9*   RDW 12.7 12.3 12.3    Recent Labs   Lab 03/31/25  1314   INR 1.3   APTT 27.0        EKG:      Telemetry:  SR    Physical Exam  Vitals and nursing note reviewed.   Constitutional:       General: He is not in acute distress.  Neck:      Comments: Anterior Neck Dressing   Cardiovascular:      Rate and Rhythm: Normal rate and regular rhythm.   Pulmonary:      Effort: Pulmonary effort is normal. No respiratory distress.   Neurological:      Mental Status: Mental status is at baseline.       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 Start Oral Tapered Amiodarone: 400 Mg PO BID x 3 Days, then 200 Mg PO BID x 3 Days, then 200 Mg PO Daily Thereafter.   Resume Full Anticoagulation when ok with Surgical Team  PRN IV Antihypertensives   Continue Tele  Follow up with [facility] Outpatient  Will be available.     Ambrosio Graham, DANIEL  Cardiology  [facility]     Physician  Addendum:    Patient's cardiac care is performed as a split-shared visit with LOREN d/t complicated medical management as detailed in A/P and associated high acuity requiring physician expertise. I obtained and performed relevant components of history/exam. Medical decision-making is formulated by me. It is a pleasure to care for the patient.    Impression/Plan:  AF - load on oral Amio   - restart full AC tx when ok by surgery        Delfino Manzo MD  [facility] Cardiology Team           [1]   No current facility-administered medications on file prior to encounter.     Current Outpatient Medications on File Prior to Encounter   Medication Sig Dispense Refill    aspirin (ECOTRIN) 81 MG EC tablet Take 81 mg by mouth every morning.      atorvastatin (LIPITOR) 80 MG tablet Take 80 mg by mouth every morning.      cholecalciferol, vitamin D3, (VITAMIN D3) 50 mcg (2,000 unit) Cap capsule Take 2,000 Units by mouth once daily.      cyanocobalamin (VITAMIN B-12) 1000 MCG tablet Take 1,000 mcg by mouth every morning.      ezetimibe (ZETIA) 10 mg tablet Take 10 mg by mouth every morning.      LIDOcaine-prilocaine (EMLA) cream Apply topically as needed (apply to [facility] site 30 minutes prior to chemotherapy). 30 g 2    losartan (COZAAR) 25 MG tablet Take 25 mg by mouth 2 (two) times a day.      multivitamin-minerals-lutein (MULTIVITAMIN 50 PLUS) Tab Take 1 tablet by mouth every morning.      ondansetron (ZOFRAN) 8 MG tablet Take 1 tablet (8 mg total) by mouth every 8 (eight) hours as needed. 60 tablet 6    pantoprazole (PROTONIX) 40 MG tablet Take 40 mg by mouth every morning.      prochlorperazine (COMPAZINE) 10 MG tablet Take 1 tablet (10 mg total) by mouth every 8 (eight) hours as needed (nausea). 30 tablet 1    traMADoL (ULTRAM) 50 mg tablet Take 1 tablet by mouth daily as needed.      traZODone (DESYREL) 100 MG tablet Take 100 mg by mouth every evening.      amLODIPine (NORVASC) 5 MG tablet Take 5 mg by mouth every evening.  2

## 2025-04-04 NOTE — PLAN OF CARE
Spoke with Bobbi at [facility]. [facility] is not in network. Cancelled with Kiersten at [facility]. Bobbi said to let her know when pt is discharged and they will get everything out to him. Send Bobbi the AVS via fax at [place].    [facility]bi[facility].    Per Clare with [facility] dept they have already setup [facility].   to send [place] paperwork when available. Left message for Raquel Dominguez at [facility] dept ph# [phone], [phone], [phone]   fax# [phone].

## 2025-04-04 NOTE — ANESTHESIA POSTPROCEDURE EVALUATION
Anesthesia Post Evaluation    Patient: Marcelino Al    Procedure(s) Performed: Procedure(s) (LRB):  ROBOTIC ESOPHAGECTOMY, TRANSHIATAL (N/A)    Final Anesthesia Type: general      Patient location during evaluation: PACU  Patient participation: Yes- Able to Participate  Level of consciousness: awake and alert  Post-procedure vital signs: reviewed and stable  Pain management: adequate  Airway patency: patent      Anesthetic complications: no      Cardiovascular status: hemodynamically stable  Respiratory status: unassisted  Hydration status: euvolemic  Follow-up not needed.              Vitals Value Taken Time   /86 03/31/25 19:40   Temp 37.1 °C (98.8 °F) 03/31/25 19:30   Pulse 97 03/31/25 19:40   Resp 18 03/31/25 19:40   SpO2 96 % 03/31/25 19:40         Event Time   Out of Recovery 19:42:00         Pain/Hallie Score: Pain Rating Prior to Med Admin: 7 (4/4/2025  5:13 AM)  Pain Rating Post Med Admin: 2 (4/4/2025  5:42 AM)

## 2025-04-05 LAB
ANION GAP SERPL CALC-SCNC: 11 MEQ/L
BASOPHILS # BLD AUTO: 0.03 X10(3)/MCL
BASOPHILS NFR BLD AUTO: 0.4 %
BUN SERPL-MCNC: 9.7 MG/DL (ref 8.4–25.7)
CALCIUM SERPL-MCNC: 8.7 MG/DL (ref 8.8–10)
CHLORIDE SERPL-SCNC: 103 MMOL/L (ref 98–107)
CO2 SERPL-SCNC: 23 MMOL/L (ref 23–31)
CREAT SERPL-MCNC: 0.71 MG/DL (ref 0.72–1.25)
CREAT/UREA NIT SERPL: 14
EOSINOPHIL # BLD AUTO: 0.28 X10(3)/MCL (ref 0–0.9)
EOSINOPHIL NFR BLD AUTO: 4.2 %
ERYTHROCYTE [DISTWIDTH] IN BLOOD BY AUTOMATED COUNT: 12.3 % (ref 11.5–17)
GFR SERPLBLD CREATININE-BSD FMLA CKD-EPI: >60 ML/MIN/1.73/M2
GLUCOSE SERPL-MCNC: 120 MG/DL (ref 82–115)
HCT VFR BLD AUTO: 36.8 % (ref 42–52)
HGB BLD-MCNC: 12.6 G/DL (ref 14–18)
IMM GRANULOCYTES # BLD AUTO: 0.04 X10(3)/MCL (ref 0–0.04)
IMM GRANULOCYTES NFR BLD AUTO: 0.6 %
LYMPHOCYTES # BLD AUTO: 0.64 X10(3)/MCL (ref 0.6–4.6)
LYMPHOCYTES NFR BLD AUTO: 9.5 %
MAGNESIUM SERPL-MCNC: 1.8 MG/DL (ref 1.6–2.6)
MCH RBC QN AUTO: 32.4 PG (ref 27–31)
MCHC RBC AUTO-ENTMCNC: 34.2 G/DL (ref 33–36)
MCV RBC AUTO: 94.6 FL (ref 80–94)
MONOCYTES # BLD AUTO: 0.98 X10(3)/MCL (ref 0.1–1.3)
MONOCYTES NFR BLD AUTO: 14.6 %
NEUTROPHILS # BLD AUTO: 4.76 X10(3)/MCL (ref 2.1–9.2)
NEUTROPHILS NFR BLD AUTO: 70.7 %
NRBC BLD AUTO-RTO: 0 %
PLATELET # BLD AUTO: 187 X10(3)/MCL (ref 130–400)
PMV BLD AUTO: 10.1 FL (ref 7.4–10.4)
POTASSIUM SERPL-SCNC: 3.5 MMOL/L (ref 3.5–5.1)
RBC # BLD AUTO: 3.89 X10(6)/MCL (ref 4.7–6.1)
SODIUM SERPL-SCNC: 137 MMOL/L (ref 136–145)
WBC # BLD AUTO: 6.73 X10(3)/MCL (ref 4.5–11.5)

## 2025-04-05 PROCEDURE — 94799 UNLISTED PULMONARY SVC/PX: CPT

## 2025-04-05 PROCEDURE — 63600175 PHARM REV CODE 636 W HCPCS: Performed by: SURGERY

## 2025-04-05 PROCEDURE — 11000001 HC ACUTE MED/SURG PRIVATE ROOM

## 2025-04-05 PROCEDURE — 25000003 PHARM REV CODE 250: Performed by: NURSE PRACTITIONER

## 2025-04-05 PROCEDURE — 94760 N-INVAS EAR/PLS OXIMETRY 1: CPT

## 2025-04-05 PROCEDURE — 36415 COLL VENOUS BLD VENIPUNCTURE: CPT | Performed by: NURSE PRACTITIONER

## 2025-04-05 PROCEDURE — 63600175 PHARM REV CODE 636 W HCPCS: Performed by: NURSE PRACTITIONER

## 2025-04-05 PROCEDURE — 83735 ASSAY OF MAGNESIUM: CPT | Performed by: NURSE PRACTITIONER

## 2025-04-05 PROCEDURE — 99900035 HC TECH TIME PER 15 MIN (STAT)

## 2025-04-05 PROCEDURE — 85025 COMPLETE CBC W/AUTO DIFF WBC: CPT | Performed by: NURSE PRACTITIONER

## 2025-04-05 PROCEDURE — 25000003 PHARM REV CODE 250: Performed by: SURGERY

## 2025-04-05 PROCEDURE — 80048 BASIC METABOLIC PNL TOTAL CA: CPT | Performed by: NURSE PRACTITIONER

## 2025-04-05 PROCEDURE — 99900031 HC PATIENT EDUCATION (STAT)

## 2025-04-05 RX ORDER — TRAZODONE HYDROCHLORIDE 100 MG/1
100 TABLET ORAL NIGHTLY
Status: DISCONTINUED | OUTPATIENT
Start: 2025-04-05 | End: 2025-04-07 | Stop reason: HOSPADM

## 2025-04-05 RX ORDER — DIAZEPAM 5 MG/1
5 TABLET ORAL EVERY 8 HOURS PRN
Status: DISCONTINUED | OUTPATIENT
Start: 2025-04-05 | End: 2025-04-07 | Stop reason: HOSPADM

## 2025-04-05 RX ADMIN — AMIODARONE HYDROCHLORIDE 400 MG: 200 TABLET ORAL at 09:04

## 2025-04-05 RX ADMIN — TRAZODONE HYDROCHLORIDE 100 MG: 100 TABLET ORAL at 09:04

## 2025-04-05 RX ADMIN — OXYCODONE HYDROCHLORIDE 5 MG: 5 TABLET ORAL at 06:04

## 2025-04-05 RX ADMIN — ALPRAZOLAM 0.25 MG: 0.25 TABLET ORAL at 06:04

## 2025-04-05 RX ADMIN — DOXYCYCLINE 100 MG: 100 INJECTION, POWDER, LYOPHILIZED, FOR SOLUTION INTRAVENOUS at 02:04

## 2025-04-05 RX ADMIN — MUPIROCIN: 20 OINTMENT TOPICAL at 09:04

## 2025-04-05 RX ADMIN — AMIODARONE HYDROCHLORIDE 400 MG: 200 TABLET ORAL at 10:04

## 2025-04-05 RX ADMIN — ALPRAZOLAM 0.25 MG: 0.25 TABLET ORAL at 10:04

## 2025-04-05 RX ADMIN — PIPERACILLIN SODIUM AND TAZOBACTAM SODIUM 4.5 G: 4; .5 INJECTION, POWDER, LYOPHILIZED, FOR SOLUTION INTRAVENOUS at 06:04

## 2025-04-05 RX ADMIN — OXYCODONE HYDROCHLORIDE 5 MG: 5 TABLET ORAL at 04:04

## 2025-04-05 RX ADMIN — ENOXAPARIN SODIUM 40 MG: 40 INJECTION SUBCUTANEOUS at 04:04

## 2025-04-05 RX ADMIN — PIPERACILLIN SODIUM AND TAZOBACTAM SODIUM 4.5 G: 4; .5 INJECTION, POWDER, LYOPHILIZED, FOR SOLUTION INTRAVENOUS at 04:04

## 2025-04-05 RX ADMIN — DOXYCYCLINE 100 MG: 100 INJECTION, POWDER, LYOPHILIZED, FOR SOLUTION INTRAVENOUS at 03:04

## 2025-04-05 NOTE — PLAN OF CARE
Problem: Adult Inpatient Plan of Care  Goal: Plan of Care Review  4/5/2025 0411 by Cely Anderson RN  Outcome: Progressing  4/4/2025 2005 by Cely Anderson RN  Outcome: Progressing  Goal: Patient-Specific Goal (Individualized)  4/5/2025 0411 by Cely Anderson RN  Outcome: Progressing  4/4/2025 2005 by Cely Anderson RN  Outcome: Progressing  Goal: Absence of Hospital-Acquired Illness or Injury  4/5/2025 0411 by Cely Anderson RN  Outcome: Progressing  4/4/2025 2005 by Cely Anderson RN  Outcome: Progressing  Goal: Optimal Comfort and Wellbeing  4/5/2025 0411 by Cely Anderson RN  Outcome: Progressing  4/4/2025 2005 by Cely Anderson RN  Outcome: Progressing  Goal: Readiness for Transition of Care  4/5/2025 0411 by Cely Anderson RN  Outcome: Progressing  4/4/2025 2005 by Cely Anderson RN  Outcome: Progressing

## 2025-04-05 NOTE — PROGRESS NOTES
Patient feels well but for some anxiety - he has not slept since surgery.  He notes he wakes up to collection of phlegm, so he has to get a pillow to cough, etc.  He thinks his trazadone will help him stay asleep.  He is coughing thick tan/green material - abx started last night (zosyn) due to his h/o pneumonia.     Regarding sleep - he takes trazadone for sleep.  This will be reinitiated.     Cramps and pain yesterday - he passed some liquid stool with chunks.  Reglan held/stopped.  Will reinitiate tube feeds today to see if this can be set to goal.     US with cephalic/basilic vein clot, no dvt.  Arm better today.     Vs ok, htn with sbp to 160s noted  A+ox3, sitting upright at chair at window  Eomi  Abd soft, inc c/d/I, J tube in place.    Neck incision well healing without complication  Left arm swelling is better compared to yesterday.  Erythema is also much improved.   Skin otherwise no rashes    Labs   WBC 6.7, HGB 12.6, Plt 187  Na 137, K 3.5  BUN/CR 9/0.7    79M s/p esophagectomy - doing well.  Findings as noted above.  Plan for:  1. Trazadone po at night for sleep  2. Ok to take oral anti-htn medication  3. Initiate diazepam 5 mg po q 8 hours prn anxiety/spasm  4. Zosyn for potential bronchitis - chronic lung issue/infection it sounds like  5. Cont doxycycline for left arm - complete 7 day course

## 2025-04-06 LAB
ANION GAP SERPL CALC-SCNC: 8 MEQ/L
BASOPHILS # BLD AUTO: 0.01 X10(3)/MCL
BASOPHILS NFR BLD AUTO: 0.2 %
BUN SERPL-MCNC: 11 MG/DL (ref 8.4–25.7)
CALCIUM SERPL-MCNC: 8.2 MG/DL (ref 8.8–10)
CHLORIDE SERPL-SCNC: 105 MMOL/L (ref 98–107)
CO2 SERPL-SCNC: 24 MMOL/L (ref 23–31)
CREAT SERPL-MCNC: 0.8 MG/DL (ref 0.72–1.25)
CREAT/UREA NIT SERPL: 14
EOSINOPHIL # BLD AUTO: 0.29 X10(3)/MCL (ref 0–0.9)
EOSINOPHIL NFR BLD AUTO: 4.9 %
ERYTHROCYTE [DISTWIDTH] IN BLOOD BY AUTOMATED COUNT: 12.4 % (ref 11.5–17)
GFR SERPLBLD CREATININE-BSD FMLA CKD-EPI: >60 ML/MIN/1.73/M2
GLUCOSE SERPL-MCNC: 161 MG/DL (ref 82–115)
HCT VFR BLD AUTO: 32.4 % (ref 42–52)
HGB BLD-MCNC: 10.9 G/DL (ref 14–18)
IMM GRANULOCYTES # BLD AUTO: 0.04 X10(3)/MCL (ref 0–0.04)
IMM GRANULOCYTES NFR BLD AUTO: 0.7 %
LYMPHOCYTES # BLD AUTO: 0.65 X10(3)/MCL (ref 0.6–4.6)
LYMPHOCYTES NFR BLD AUTO: 11.1 %
MAGNESIUM SERPL-MCNC: 1.7 MG/DL (ref 1.6–2.6)
MCH RBC QN AUTO: 32.2 PG (ref 27–31)
MCHC RBC AUTO-ENTMCNC: 33.6 G/DL (ref 33–36)
MCV RBC AUTO: 95.6 FL (ref 80–94)
MONOCYTES # BLD AUTO: 0.74 X10(3)/MCL (ref 0.1–1.3)
MONOCYTES NFR BLD AUTO: 12.6 %
NEUTROPHILS # BLD AUTO: 4.14 X10(3)/MCL (ref 2.1–9.2)
NEUTROPHILS NFR BLD AUTO: 70.5 %
NRBC BLD AUTO-RTO: 0 %
PLATELET # BLD AUTO: 172 X10(3)/MCL (ref 130–400)
PMV BLD AUTO: 10.2 FL (ref 7.4–10.4)
POCT GLUCOSE: 127 MG/DL (ref 70–110)
POTASSIUM SERPL-SCNC: 3.3 MMOL/L (ref 3.5–5.1)
RBC # BLD AUTO: 3.39 X10(6)/MCL (ref 4.7–6.1)
SODIUM SERPL-SCNC: 137 MMOL/L (ref 136–145)
WBC # BLD AUTO: 5.87 X10(3)/MCL (ref 4.5–11.5)

## 2025-04-06 PROCEDURE — 11000001 HC ACUTE MED/SURG PRIVATE ROOM

## 2025-04-06 PROCEDURE — 25000003 PHARM REV CODE 250: Performed by: SURGERY

## 2025-04-06 PROCEDURE — 36415 COLL VENOUS BLD VENIPUNCTURE: CPT | Performed by: NURSE PRACTITIONER

## 2025-04-06 PROCEDURE — 94799 UNLISTED PULMONARY SVC/PX: CPT

## 2025-04-06 PROCEDURE — 25000003 PHARM REV CODE 250: Performed by: NURSE PRACTITIONER

## 2025-04-06 PROCEDURE — 63600175 PHARM REV CODE 636 W HCPCS: Performed by: SURGERY

## 2025-04-06 PROCEDURE — 85025 COMPLETE CBC W/AUTO DIFF WBC: CPT | Performed by: NURSE PRACTITIONER

## 2025-04-06 PROCEDURE — 83735 ASSAY OF MAGNESIUM: CPT | Performed by: NURSE PRACTITIONER

## 2025-04-06 PROCEDURE — 80048 BASIC METABOLIC PNL TOTAL CA: CPT | Performed by: NURSE PRACTITIONER

## 2025-04-06 PROCEDURE — 63600175 PHARM REV CODE 636 W HCPCS: Performed by: NURSE PRACTITIONER

## 2025-04-06 RX ORDER — POTASSIUM CHLORIDE 20 MEQ/1
60 TABLET, EXTENDED RELEASE ORAL ONCE
Status: COMPLETED | OUTPATIENT
Start: 2025-04-06 | End: 2025-04-06

## 2025-04-06 RX ADMIN — POTASSIUM CHLORIDE 60 MEQ: 1500 TABLET, EXTENDED RELEASE ORAL at 02:04

## 2025-04-06 RX ADMIN — DOXYCYCLINE 100 MG: 100 INJECTION, POWDER, LYOPHILIZED, FOR SOLUTION INTRAVENOUS at 01:04

## 2025-04-06 RX ADMIN — OXYCODONE HYDROCHLORIDE 5 MG: 5 TABLET ORAL at 05:04

## 2025-04-06 RX ADMIN — TRAZODONE HYDROCHLORIDE 100 MG: 100 TABLET ORAL at 08:04

## 2025-04-06 RX ADMIN — OXYCODONE HYDROCHLORIDE 5 MG: 5 TABLET ORAL at 09:04

## 2025-04-06 RX ADMIN — PIPERACILLIN SODIUM AND TAZOBACTAM SODIUM 4.5 G: 4; .5 INJECTION, POWDER, LYOPHILIZED, FOR SOLUTION INTRAVENOUS at 02:04

## 2025-04-06 RX ADMIN — PIPERACILLIN SODIUM AND TAZOBACTAM SODIUM 4.5 G: 4; .5 INJECTION, POWDER, LYOPHILIZED, FOR SOLUTION INTRAVENOUS at 09:04

## 2025-04-06 RX ADMIN — ENOXAPARIN SODIUM 40 MG: 40 INJECTION SUBCUTANEOUS at 04:04

## 2025-04-06 RX ADMIN — OXYCODONE HYDROCHLORIDE 5 MG: 5 TABLET ORAL at 06:04

## 2025-04-06 RX ADMIN — AMIODARONE HYDROCHLORIDE 400 MG: 200 TABLET ORAL at 09:04

## 2025-04-06 RX ADMIN — PIPERACILLIN SODIUM AND TAZOBACTAM SODIUM 4.5 G: 4; .5 INJECTION, POWDER, LYOPHILIZED, FOR SOLUTION INTRAVENOUS at 04:04

## 2025-04-06 RX ADMIN — AMIODARONE HYDROCHLORIDE 400 MG: 200 TABLET ORAL at 08:04

## 2025-04-06 NOTE — PROGRESS NOTES
Had a good night's rest   Passing stool and flatus  TF up to goal  Sputum is lessening - he noted a month-long bronchitis type diagnosis leading up to the surgery (ie likely some infection lingering and carrying over after surgery). ? If any of this is related to some aspiration, which hopefully will not be relevant any  longer s/p surgery.  Sputum better with abx.     Vs ok  A+ox3, resting in bed with cpap before exam  Eomi  Neck supple  Inc c/d/I  Abd soft, nontender  Skin no rashes  Left arm swelling and erythema markedly improved from 2 days prior.    Labs   WBC 5.87, hgb 10.9, Plt 172  Na 137, K 3.3  BUN/CR 11/0.8  Glucose 161, Calcium 8.2    79M s/p esophagectomy - doing very well.  Findings as noted above.  Plan for:  1. Cont care  2. Potential dc in the next day or two

## 2025-04-07 VITALS
WEIGHT: 176.81 LBS | HEIGHT: 75 IN | DIASTOLIC BLOOD PRESSURE: 72 MMHG | SYSTOLIC BLOOD PRESSURE: 145 MMHG | RESPIRATION RATE: 20 BRPM | HEART RATE: 72 BPM | OXYGEN SATURATION: 96 % | TEMPERATURE: 98 F | BODY MASS INDEX: 21.98 KG/M2

## 2025-04-07 LAB
ANION GAP SERPL CALC-SCNC: 7 MEQ/L
BASOPHILS # BLD AUTO: 0.01 X10(3)/MCL
BASOPHILS NFR BLD AUTO: 0.2 %
BUN SERPL-MCNC: 11.9 MG/DL (ref 8.4–25.7)
CALCIUM SERPL-MCNC: 8.3 MG/DL (ref 8.8–10)
CHLORIDE SERPL-SCNC: 107 MMOL/L (ref 98–107)
CO2 SERPL-SCNC: 23 MMOL/L (ref 23–31)
CREAT SERPL-MCNC: 0.74 MG/DL (ref 0.72–1.25)
CREAT/UREA NIT SERPL: 16
EOSINOPHIL # BLD AUTO: 0.28 X10(3)/MCL (ref 0–0.9)
EOSINOPHIL NFR BLD AUTO: 5 %
ERYTHROCYTE [DISTWIDTH] IN BLOOD BY AUTOMATED COUNT: 12.3 % (ref 11.5–17)
GFR SERPLBLD CREATININE-BSD FMLA CKD-EPI: >60 ML/MIN/1.73/M2
GLUCOSE SERPL-MCNC: 119 MG/DL (ref 82–115)
HCT VFR BLD AUTO: 33.5 % (ref 42–52)
HGB BLD-MCNC: 11.3 G/DL (ref 14–18)
IMM GRANULOCYTES # BLD AUTO: 0.07 X10(3)/MCL (ref 0–0.04)
IMM GRANULOCYTES NFR BLD AUTO: 1.3 %
LYMPHOCYTES # BLD AUTO: 0.61 X10(3)/MCL (ref 0.6–4.6)
LYMPHOCYTES NFR BLD AUTO: 10.9 %
MAGNESIUM SERPL-MCNC: 1.9 MG/DL (ref 1.6–2.6)
MCH RBC QN AUTO: 32.3 PG (ref 27–31)
MCHC RBC AUTO-ENTMCNC: 33.7 G/DL (ref 33–36)
MCV RBC AUTO: 95.7 FL (ref 80–94)
MONOCYTES # BLD AUTO: 0.85 X10(3)/MCL (ref 0.1–1.3)
MONOCYTES NFR BLD AUTO: 15.2 %
NEUTROPHILS # BLD AUTO: 3.78 X10(3)/MCL (ref 2.1–9.2)
NEUTROPHILS NFR BLD AUTO: 67.4 %
NRBC BLD AUTO-RTO: 0 %
PLATELET # BLD AUTO: 204 X10(3)/MCL (ref 130–400)
PMV BLD AUTO: 10.1 FL (ref 7.4–10.4)
POTASSIUM SERPL-SCNC: 4.5 MMOL/L (ref 3.5–5.1)
RBC # BLD AUTO: 3.5 X10(6)/MCL (ref 4.7–6.1)
SODIUM SERPL-SCNC: 137 MMOL/L (ref 136–145)
WBC # BLD AUTO: 5.6 X10(3)/MCL (ref 4.5–11.5)

## 2025-04-07 PROCEDURE — 83735 ASSAY OF MAGNESIUM: CPT | Performed by: NURSE PRACTITIONER

## 2025-04-07 PROCEDURE — 63600175 PHARM REV CODE 636 W HCPCS: Performed by: NURSE PRACTITIONER

## 2025-04-07 PROCEDURE — 85025 COMPLETE CBC W/AUTO DIFF WBC: CPT | Performed by: NURSE PRACTITIONER

## 2025-04-07 PROCEDURE — 99024 POSTOP FOLLOW-UP VISIT: CPT | Mod: ,,, | Performed by: NURSE PRACTITIONER

## 2025-04-07 PROCEDURE — 36415 COLL VENOUS BLD VENIPUNCTURE: CPT | Performed by: NURSE PRACTITIONER

## 2025-04-07 PROCEDURE — 25000003 PHARM REV CODE 250: Performed by: SURGERY

## 2025-04-07 PROCEDURE — 63600175 PHARM REV CODE 636 W HCPCS: Performed by: SURGERY

## 2025-04-07 PROCEDURE — 25000003 PHARM REV CODE 250: Performed by: NURSE PRACTITIONER

## 2025-04-07 PROCEDURE — 80048 BASIC METABOLIC PNL TOTAL CA: CPT | Performed by: NURSE PRACTITIONER

## 2025-04-07 RX ORDER — TRAMADOL HYDROCHLORIDE 50 MG/1
50 TABLET ORAL EVERY 8 HOURS PRN
Qty: 18 TABLET | Refills: 0 | Status: SHIPPED | OUTPATIENT
Start: 2025-04-07 | End: 2025-04-13

## 2025-04-07 RX ORDER — HEPARIN 100 UNIT/ML
5 SYRINGE INTRAVENOUS ONCE
Status: COMPLETED | OUTPATIENT
Start: 2025-04-07 | End: 2025-04-07

## 2025-04-07 RX ORDER — AMIODARONE HYDROCHLORIDE 200 MG/1
TABLET ORAL
Qty: 36 TABLET | Refills: 0 | Status: SHIPPED | OUTPATIENT
Start: 2025-04-07 | End: 2025-04-22

## 2025-04-07 RX ADMIN — PIPERACILLIN SODIUM AND TAZOBACTAM SODIUM 4.5 G: 4; .5 INJECTION, POWDER, LYOPHILIZED, FOR SOLUTION INTRAVENOUS at 09:04

## 2025-04-07 RX ADMIN — OXYCODONE HYDROCHLORIDE 5 MG: 5 TABLET ORAL at 04:04

## 2025-04-07 RX ADMIN — DOXYCYCLINE 100 MG: 100 INJECTION, POWDER, LYOPHILIZED, FOR SOLUTION INTRAVENOUS at 01:04

## 2025-04-07 RX ADMIN — AMIODARONE HYDROCHLORIDE 200 MG: 200 TABLET ORAL at 09:04

## 2025-04-07 RX ADMIN — HEPARIN 500 UNITS: 100 SYRINGE at 06:04

## 2025-04-07 RX ADMIN — PIPERACILLIN SODIUM AND TAZOBACTAM SODIUM 4.5 G: 4; .5 INJECTION, POWDER, LYOPHILIZED, FOR SOLUTION INTRAVENOUS at 02:04

## 2025-04-07 RX ADMIN — DOXYCYCLINE 100 MG: 100 INJECTION, POWDER, LYOPHILIZED, FOR SOLUTION INTRAVENOUS at 03:04

## 2025-04-07 NOTE — DISCHARGE SUMMARY
[facility] - 8th Floor Med Surg  General Surgery  Discharge Summary      Patient Name: Marcelino Al  MRN: [id number]  Admission Date: 3/31/2025  Hospital Length of Stay: 7 days  Discharge Date and Time: 04/07/2025 2:04 PM  Attending Physician: Gabe Colby MD   Discharging Provider: Mira Woo NP  Primary Care Provider: No primary care provider on file.    HPI:   No notes on file    Procedure(s) (LRB):  ROBOTIC ESOPHAGECTOMY, TRANSHIATAL (N/A)      Indwelling Lines/Drains at time of discharge:   Lines/Drains/Airways       Central Venous Catheter Line  Duration                  PowerPort A Cath Single Lumen 10/21/24 1031 Internal Jugular Right 168 days              Drain  Duration                  Gastrostomy/Enterostomy 03/31/25 Jejunostomy tube 7 days                  Hospital Course: Patient Is status post esophagectomy 3/31/25. Post op course complicated by new onset A fib with RVR controlled with IV amiodarone. Patient has progressed well thereafter and is cleared for discharge. He is tolerating J tube feedings as prescribed, ambulating and having BM.     Goals of Care Treatment Preferences:       Living Will: Yes              Consults:   Consults (From admission, onward)          Status Ordering Provider     Inpatient consult to Midline team  Once        Provider:  (Not yet assigned)    Acknowledged GABE COLBY     Inpatient consult to Registered Dietitian/Nutritionist  Once        Provider:  (Not yet assigned)    MIRA Ba     Inpatient consult to Social Work/Case Management  Once        Provider:  (Not yet assigned)    Completed MIRA WOO     Inpatient consult to Cardiology  Once        Provider:  Mike Frey MD    Completed GABE COLBY            Significant Diagnostic Studies: Labs: BMP:   Recent Labs   Lab 04/06/25  0408 04/07/25  0426    137   K 3.3* 4.5    107   CO2 24 23   BUN 11.0 11.9   CREATININE 0.80 0.74   CALCIUM  "8.2* 8.3*   MG 1.70 1.90    and CBC   Recent Labs   Lab 04/06/25  0408 04/07/25  0426   WBC 5.87 5.60   HGB 10.9* 11.3*   HCT 32.4* 33.5*    204       Pending Diagnostic Studies:       None          Final Active Diagnoses:    Diagnosis Date Noted POA    PRINCIPAL PROBLEM:  GE junction carcinoma [C16.0] 10/13/2024 Yes    Moderate malnutrition [E44.0] 04/03/2025 Yes    Atrial fibrillation with RVR [I48.91] 04/02/2025 No      Problems Resolved During this Admission:      Discharged Condition: stable    Disposition: Home or Self Care    Follow Up:   Follow-up Information       [facility] Follow up.    Specialties: Home Health Services, Home Therapy Services, Home Living Aide Services  Why: This is your home health provider. You can contact your provider with any questions or concerns.  Contact information:  [address] [facility] [address] [phone]             Rebecca Woo, NP Follow up in 2 week(s).    Specialties: Surgical Oncology, General Surgery  Contact information:  [address] [place] [id number] [phone]                           Patient Instructions:      ENTERAL NUTRITION FOR HOME USE   Order Comments: Dx: esophageal mass (150.9)    Isosource 1.5 or equivalent substitute     Order Specific Question Answer Comments   Height: 6' 3" (1.905 m)    Weight: 80.2 kg (176 lb 12.9 oz)    Does the patient have permanent non-function or disease of the structures that normally permit food to reach or be absorbed from the small bowel? Yes    Does the patient require tube feedings to provide sufficient nutrients to maintain weight and strength commensurate with the patient's overall health status? Yes    Select Formula: Isosource 1.5    Method of administration: Other (please comment) Kangaroo pump   Does the patient have a documented allergy or intolerance to semi-synthetic nutrients? No    Rate/frequency of administration and/or number of calories per 24 hr " period: start at 10ml/hr and advance as tolerated to goal rate of 70ml/hr    Length of need (1-99 months)? 99      Diet Adult Regular     Order Specific Question Answer Comments   Additional restrictions: Full Liquid      Lifting restrictions   Order Comments: Do not lift objects heavier than 10 pounds until cleared by Dr Talbot     No driving until:   Order Comments: No driving until approved by Dr Talbot     No dressing needed   Order Comments: No bandage needed over incision sites. Ok to leave open to air. Shower daily. No tub soaking.     Medications:  Reconciled Home Medications:      Medication List        START taking these medications      amiodarone 200 MG Tab  Commonly known as: PACERONE  Take 1 tablet (200 mg total) by mouth 2 (two) times daily for 3 days, THEN 1 tablet (200 mg total) once daily.  Start taking on: April 7, 2025     apixaban 5 mg Tab  Commonly known as: ELIQUIS  Take 1 tablet (5 mg total) by mouth 2 (two) times daily.            CHANGE how you take these medications      traMADoL 50 mg tablet  Commonly known as: ULTRAM  Take 1 tablet (50 mg total) by mouth every 8 (eight) hours as needed for Pain.  What changed:   when to take this  reasons to take this            CONTINUE taking these medications      atorvastatin 80 MG tablet  Commonly known as: LIPITOR  Take 80 mg by mouth every morning.     cholecalciferol (vitamin D3) 50 mcg (2,000 unit) Cap capsule  Commonly known as: VITAMIN D3  Take 2,000 Units by mouth once daily.     cyanocobalamin 1000 MCG tablet  Commonly known as: VITAMIN B-12  Take 1,000 mcg by mouth every morning.     ezetimibe 10 mg tablet  Commonly known as: ZETIA  Take 10 mg by mouth every morning.     losartan 25 MG tablet  Commonly known as: COZAAR  Take 25 mg by mouth 2 (two) times a day.     MULTIVITAMIN 50 PLUS Tab  Generic drug: multivitamin-minerals-lutein  Take 1 tablet by mouth every morning.     traZODone 100 MG tablet  Commonly known as: DESYREL  Take 100 mg by  mouth every evening.            STOP taking these medications      amLODIPine 5 MG tablet  Commonly known as: NORVASC     aspirin 81 MG EC tablet  Commonly known as: ECOTRIN     LIDOcaine-prilocaine cream  Commonly known as: EMLA     ondansetron 8 MG tablet  Commonly known as: ZOFRAN     pantoprazole 40 MG tablet  Commonly known as: PROTONIX     prochlorperazine 10 MG tablet  Commonly known as: COMPAZINE            Time spent on the discharge of patient: 35 minutes    Rebecca Woo NP  General Surgery  [facility] - 8th Floor Med Surg

## 2025-04-07 NOTE — PROGRESS NOTES
[facility]   Surgical Oncology  Progress Note    Subjective:       Interval History: POD 7. Patient progressing well. He is tolerating tube feeding at 70 mL/hr. Ambulating well. Tolerating PO intake. Last BM 4/6.     Post-Op Info:  Procedure(s) (LRB):  ROBOTIC ESOPHAGECTOMY, TRANSHIATAL (N/A)   7 Days Post-Op      Medications:  Continuous Infusions:  Scheduled Meds:   amiodarone  200 mg Oral BID    Followed by    [START ON 4/10/2025] amiodarone  200 mg Oral Daily    doxycycline IV (PEDS and ADULTS)  100 mg Intravenous Q12H    enoxparin  40 mg Subcutaneous Daily    piperacillin-tazobactam (Zosyn) IV (PEDS and ADULTS) (extended infusion is not appropriate)  4.5 g Intravenous Q8H    traZODone  100 mg Oral QHS     PRN Meds:    Current Facility-Administered Medications:     diazePAM, 5 mg, Oral, Q8H PRN    diphenhydrAMINE, 12.5 mg, Intravenous, Q4H PRN    hydrALAZINE, 10 mg, Intravenous, Q4H PRN    metoprolol, 5 mg, Intravenous, Q2H PRN    naloxone, 0.02 mg, Intravenous, PRN    ondansetron, 4 mg, Intravenous, Q12H PRN    oxyCODONE, 5 mg, Oral, Q6H PRN    prochlorperazine, 5 mg, Intravenous, Q6H PRN    Flushing PICC/Midline Protocol, , , Until Discontinued **AND** sodium chloride 0.9%, 10 mL, Intravenous, Q12H PRN     Objective:     Vital Signs (Most Recent):  Temp: 98 °F (36.7 °C) (04/07/25 1207)  Pulse: 87 (04/07/25 1207)  Resp: 20 (04/07/25 1207)  BP: 139/66 (04/07/25 1207)  SpO2: 97 % (04/07/25 1207) Vital Signs (24h Range):  Temp:  [97 °F (36.1 °C)-98.6 °F (37 °C)] 98 °F (36.7 °C)  Pulse:  [78-88] 87  Resp:  [16-20] 20  SpO2:  [91 %-97 %] 97 %  BP: (135-156)/(66-80) 139/66       Intake/Output Summary (Last 24 hours) at 4/7/2025 1324  Last data filed at 4/7/2025 0747  Gross per 24 hour   Intake 1930.55 ml   Output 2050 ml   Net -119.45 ml       Physical Exam  Constitutional:       General: He is not in acute distress.  HENT:      Head: Atraumatic.      Nose: Nose normal.       Mouth/Throat:      Mouth: Mucous membranes are moist.   Eyes:      General: No scleral icterus.     Extraocular Movements: Extraocular movements intact.      Conjunctiva/sclera: Conjunctivae normal.   Neck:      Comments: Left neck incision approximated with staples. No drainage   Cardiovascular:      Rate and Rhythm: Normal rate and regular rhythm.      Pulses: Normal pulses.      Heart sounds: Normal heart sounds.   Pulmonary:      Effort: Pulmonary effort is normal. No respiratory distress.      Breath sounds: Normal breath sounds.   Abdominal:      General: Bowel sounds are normal.      Palpations: Abdomen is soft.      Comments: Upper midline incision approximated with dermabond. Lap sites C/D/I. J tube intact with tube feeding in progress   Musculoskeletal:         General: No swelling.      Cervical back: Normal range of motion and neck supple. No rigidity.   Skin:     General: Skin is warm and dry.   Neurological:      General: No focal deficit present.      Mental Status: He is alert and oriented to person, place, and time.   Psychiatric:         Mood and Affect: Mood normal.         Behavior: Behavior normal.         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  Now status post transhiatal esophagectomy on 03/31/2025.    Atrial fibrillation with RVR  - cardiology following. Transitioned from IV to PO amiodarone  Thrombophlebitis left upper extremity treated with Doxycycline     - Cardiology recommends full dose OAC. Discussed with cardiology today, initiate today prior to dc  - No further antibiotics needed at discharge     Discussed wound care  and precautions for discharge with patient, spouse and son. Verbalized understanding.         Rebecca Woo NP  Surgical Oncology  [facility]

## 2025-04-07 NOTE — PLAN OF CARE
04/07/25 1439   Final Note   Assessment Type Final Discharge Note   Anticipated Discharge Disposition Home-Health   What phone number can be called within the next 1-3 days to see how you are doing after discharge? [phone]   Hospital Resources/Appts/Education Provided Post-Acute resouces added to AVS   Post-Acute Status   Post-Acute Authorization Home Health   Home Health Status Set-up Complete/Auth obtained   Discharge Delays None known at this time     Pt being [facility] with [facility]. [facility] and [facility] Doming[facility] who [facility]. DC summary/AVS sent to Bobbi fax# [phone]. Per Bobbi, they are aware of dc today and will get tube feedings out to pt's home ASAP. Unable to tell me when pump/supplies will arrive to pt's home. She said she will let another dept know about his dc and they'll send out necessary supplies. Notified Raquel via voicemail of dc and sent DC summary/AVS via fax#[phone]. Notified Mirian at [facility] of dc and faxed DC summary/AVS to fax# [phone]. Confirmations received.   Pt and spouse aware. No further dc needs at this time.     3pm-Left VM for [facility] at [facility] to try and get a timeframe of when supplies will arrive to pt's home.    3:31pm-called [facility] at [facility]. [facility] supplies on 4/4/25 but they do not have a tracking number.   Pt's granddaughter went to pt's home to see if it had arrived. No package.   Pt is ready to go home. Called Brigid at [facility] in [place]. Per Brigid a case of tube feeds will a last around 3 days for $50. Pump $80/month.  Pt has IV pole at home. Bags for feedings $4/day. Pt paid over the phone for pump, supplies and one case of tube feeds. Pt, spouse and adult son aware to call [facility] if [facility] shipment still hasn't arrived in a day or two.  Pt's granddaugther, Shai, heading to [facility] now to  supplies/ tube feedings. Facesheet & order faxed to Brigid at  #[phone]. Nurse and NP aware.

## 2025-04-07 NOTE — CONSULTS
Inpatient Nutrition Assessment    Admit Date: 3/31/2025   Total duration of encounter: 7 days   Patient Age: 79 y.o.    Nutrition Recommendation/Prescription     Continue TF as tolerated.   TF recs:  Isosource 1.5, start at 10 mL/hr and advance as tolerated to goal rate of 70 mL/hr. At goal, TF will provide:  2520 kcal (100% est needs)  114 gm protein (100% est needs)  1277 mL free water (53% est needs)  FWF: 45 mL/hr that TF is running to better meet fluid needs  **based on a 24 hr per day run time**  Oral diet as medically feasible per MD.    Communication of Recommendations: reviewed with patient and reviewed with family    Nutrition Assessment     Malnutrition Assessment/Nutrition-Focused Physical Exam      Malnutrition Context: chronic illness (04/03/25 1303)  Malnutrition Level: moderate (04/03/25 1303)     Weight Loss (Malnutrition): other (see comments) (Does not meet criteria) (04/03/25 1303)  Subcutaneous Fat (Malnutrition): mild depletion (04/03/25 1303)  Orbital Region (Subcutaneous Fat Loss): mild depletion        Muscle Mass (Malnutrition): mild depletion (04/03/25 1303)  [place] Region (Muscle Loss): mild depletion                                A minimum of two characteristics is recommended for diagnosis of either severe or non-severe malnutrition.    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  Now status post transhiatal esophagectomy on 03/31/2025.    Atrial fibrillation with RVR    Relevant Medical History: Esophageal Carcinoma, CAD, GERD, HTN, Dyslipidemia     Scheduled Medications:  amiodarone, 200 mg, BID   Followed by  [START ON 4/10/2025] amiodarone, 200 mg,  Daily  doxycycline IV (PEDS and ADULTS), 100 mg, Q12H  enoxparin, 40 mg, Daily  piperacillin-tazobactam (Zosyn) IV (PEDS and ADULTS) (extended infusion is not appropriate), 4.5 g, Q8H  traZODone, 100 mg, QHS    Continuous Infusions:     PRN Medications:  diazePAM, 5 mg, Q8H PRN  diphenhydrAMINE, 12.5 mg, Q4H PRN  hydrALAZINE, 10 mg, Q4H PRN  metoprolol, 5 mg, Q2H PRN  naloxone, 0.02 mg, PRN  ondansetron, 4 mg, Q12H PRN  oxyCODONE, 5 mg, Q6H PRN  prochlorperazine, 5 mg, Q6H PRN  sodium chloride 0.9%, 10 mL, Q12H PRN    Calorie Containing IV Medications: no significant kcals from medications at this time    Recent Labs   Lab 04/01/25  0413 04/02/25  0401 04/03/25  0750 04/04/25  0418 04/05/25  0432 04/06/25  0408 04/07/25  0426    138 136 138 137 137 137   K 4.4 4.0 3.7 3.6 3.5 3.3* 4.5   CALCIUM 8.8 8.3* 8.2* 8.5* 8.7* 8.2* 8.3*   MG 1.90 1.80 2.00 2.00 1.80 1.70 1.90    105 103 102 103 105 107   CO2 20* 25 26 27 23 24 23   BUN 12.2 10.7 9.7 9.0 9.7 11.0 11.9   CREATININE 0.84 0.73 0.69* 0.73 0.71* 0.80 0.74   EGFRNORACEVR >60 >60 >60 >60 >60 >60 >60   GLUCOSE 130* 118* 118* 136* 120* 161* 119*   WBC 10.39 9.86 6.65 6.37 6.73 5.87 5.60   HGB 13.6* 13.0* 12.3* 11.8* 12.6* 10.9* 11.3*   HCT 40.6* 38.7* 37.8* 34.8* 36.8* 32.4* 33.5*     Nutrition Orders:  Diet Full Liquid  Tube Feedings/Formulas 70; Isosource 1.5; Jejunostomy; 45    Appetite/Oral Intake: fair/25-50% of meals  Factors Affecting Nutritional Intake:  liquid diet  Social Needs Impacting Access to Food: none identified  Food/Synagogue/Cultural Preferences: none reported  Food Allergies: no known food allergies  Last Bowel Movement: 04/06/25  Wound(s):  surgical incision     Comments    4/2/25: MD consult for TF recs over 24 hrs; pt had J-tube placed on Monday; currently NPO with NG tube to suction. Per family, pt has been eating ok prior to surgery; had lost some weight during chemo but weight is almost back up to usual wt.    4/3/25: Pt  "tolerating TF at 10 mL/hr thus far.     25: Pt tolerating TF at goal rate; taking in and tolerating some full liquids.     Anthropometrics    Height: 6' 3" (190.5 cm), Height Method: Stated  Last Weight: 80.2 kg (176 lb 12.9 oz) (25 1256), Weight Method: Standard Scale  BMI (Calculated): 22.1  BMI Classification: normal (BMI 18.5-24.9)        Ideal Body Weight (IBW), Male: 196 lb     % Ideal Body Weight, Male (lb): 90.21 %                 Usual Body Weight (UBW), k.8 kg  % Usual Body Weight: 98.25  % Weight Change From Usual Weight: -1.96 %  Usual Weight Provided By: family/caregiver    Wt Readings from Last 5 Encounters:   25 80.2 kg (176 lb 12.9 oz)   25 83 kg (183 lb)   25 77.6 kg (171 lb 1.6 oz)   24 79 kg (174 lb 3.2 oz)   24 79.7 kg (175 lb 9.6 oz)     Weight Change(s) Since Admission:   Wt Readings from Last 1 Encounters:   25 1256 80.2 kg (176 lb 12.9 oz)   25 0516 80.2 kg (176 lb 12.9 oz)   25 0553 80.2 kg (176 lb 12.9 oz)   25 0946 83.9 kg (185 lb)   Admit Weight: 83.9 kg (185 lb) (25 0946), Weight Method: Standard Scale    Estimated Needs    Weight Used For Calorie Calculations: 80.2 kg (176 lb 12.9 oz)  Energy Calorie Requirements (kcal): 6030-6991 kcal (30-35 kcal/kg)  Energy Need Method: Kcal/kg  Weight Used For Protein Calculations: 80.2 kg (176 lb 12.9 oz)  Protein Requirements: 113 gm (1.4g/kg)  Fluid Requirements (mL): 2406 mL        Enteral Nutrition     Formula: Isosource 1.5 Jose M  Rate/Volume: 70 mL/hr  Water Flushes: 45 mL/hr   Additives/Modulars: none at this time  Route: jejunostomy tube  Method: continuous  Total Nutrition Provided by Tube Feeding, Additives, and Flushes:  Calories Provided  2520 kcal/d, 100% needs   Protein Provided  114 g/d, 100% needs   Fluid Provided  2357 ml/d,98% needs   Continuous feeding calculations based on estimated 24 hr/d run time unless otherwise stated.    Parenteral Nutrition "     Patient not receiving parenteral nutrition support at this time.    Evaluation of Received Nutrient Intake    Calories: not meeting estimated needs  Protein: not meeting estimated needs    Patient Education     Not applicable.    Nutrition Diagnosis     PES: Inadequate oral intake related to altered GI function as evidenced by NPO since admit. (active)     PES: Moderate chronic disease or condition related malnutrition related to chronic illness as Evidenced by:  - muscle mass depletion: 1 area of mild muscle loss (Temporalis) - loss of subcutaneous fat: 1 area of mild fat loss (Infraorbital) actove    Nutrition Interventions     Intervention(s): modified composition of enteral nutrition, modified rate of enteral nutrition, and collaboration with other providers  Intervention(s): Enteral nutrition    Goal: Meet greater than 80% of nutritional needs by follow-up. (goal progressing)  Goal: Maintain weight throughout hospitalization. (goal progressing)    Nutrition Goals & Monitoring     Dietitian will monitor: enteral nutrition intake and weight  Discharge planning: tube feeding (Isosource 1.5, goal rate of 70mL/hr)  Nutrition Risk/Follow-Up: high (follow-up in 1-4 days)   Please consult if re-assessment needed sooner.

## 2025-04-07 NOTE — HOSPITAL COURSE
Patient Is status post esophagectomy 3/31/25. Post op course complicated by new onset A fib with RVR controlled with IV amiodarone. Patient has progressed well thereafter and is cleared for discharge. He is tolerating J tube feedings as prescribed, ambulating and having BM.

## 2025-04-08 NOTE — NURSING
DC note: Patient educated on all discharge instructions, new medication education. All questions answered. Instructions provided on j-tube feedings at home. Home health to follow. NAD noted. F/u appointments made and given to patient.

## 2025-04-08 NOTE — PLAN OF CARE
Problem: Adult Inpatient Plan of Care  Goal: Plan of Care Review  4/7/2025 2004 by Archana Kincaid RN  Outcome: Met  4/7/2025 0825 by Archana Kincaid RN  Outcome: Progressing  Goal: Patient-Specific Goal (Individualized)  4/7/2025 2004 by Archana Kincaid RN  Outcome: Met  4/7/2025 0825 by Archana Kincaid RN  Outcome: Progressing  Goal: Absence of Hospital-Acquired Illness or Injury  4/7/2025 2004 by Archana Kincaid RN  Outcome: Met  4/7/2025 0825 by Archana Kincaid RN  Outcome: Progressing  Goal: Optimal Comfort and Wellbeing  4/7/2025 2004 by Archana Kincaid RN  Outcome: Met  4/7/2025 0825 by Archana Kincaid RN  Outcome: Progressing  Goal: Readiness for Transition of Care  4/7/2025 2004 by Archana Kincaid RN  Outcome: Met  4/7/2025 0825 by Archana Kincaid RN  Outcome: Progressing     Problem: Infection  Goal: Absence of Infection Signs and Symptoms  4/7/2025 2004 by Archana Kincaid RN  Outcome: Met  4/7/2025 0825 by Archana Kincaid RN  Outcome: Progressing     Problem: Wound  Goal: Optimal Coping  4/7/2025 2004 by Archana Kincaid RN  Outcome: Met  4/7/2025 0825 by Archana Kincaid RN  Outcome: Progressing  Goal: Optimal Functional Ability  4/7/2025 2004 by Archana Kincaid RN  Outcome: Met  4/7/2025 0825 by Archana Kincaid RN  Outcome: Progressing  Goal: Absence of Infection Signs and Symptoms  4/7/2025 2004 by Archana Kincaid RN  Outcome: Met  4/7/2025 0825 by Archana Kincaid RN  Outcome: Progressing  Goal: Improved Oral Intake  4/7/2025 2004 by Archana Kincaid RN  Outcome: Met  4/7/2025 0825 by Archana Kincaid RN  Outcome: Progressing  Goal: Optimal Pain Control and Function  4/7/2025 2004 by Archana Kincaid RN  Outcome: Met  4/7/2025 0825 by Archana Kincaid RN  Outcome: Progressing  Goal: Skin Health and Integrity  4/7/2025 2004 by Archana Kincaid RN  Outcome: Met  4/7/2025 0825 by Archana Kincaid RN  Outcome: Progressing  Goal: Optimal Wound Healing  4/7/2025 2004 by Archana Kincaid RN  Outcome: Met  4/7/2025 0825 by Archana Kincaid,  RN  Outcome: Progressing     Problem: Fall Injury Risk  Goal: Absence of Fall and Fall-Related Injury  4/7/2025 2004 by Archana Kincaid RN  Outcome: Met  4/7/2025 0825 by Archana Kincaid, RN  Outcome: Progressing

## 2025-04-22 ENCOUNTER — OFFICE VISIT (OUTPATIENT)
Dept: SURGICAL ONCOLOGY | Facility: CLINIC | Age: 80
End: 2025-04-22
Payer: OTHER GOVERNMENT

## 2025-04-22 VITALS
OXYGEN SATURATION: 95 % | SYSTOLIC BLOOD PRESSURE: 129 MMHG | DIASTOLIC BLOOD PRESSURE: 68 MMHG | HEIGHT: 69 IN | BODY MASS INDEX: 26.96 KG/M2 | WEIGHT: 182 LBS | HEART RATE: 79 BPM

## 2025-04-22 DIAGNOSIS — C15.9 ESOPHAGEAL ADENOCARCINOMA: Primary | ICD-10-CM

## 2025-04-22 PROCEDURE — 99214 OFFICE O/P EST MOD 30 MIN: CPT | Mod: PBBFAC | Performed by: NURSE PRACTITIONER

## 2025-04-22 PROCEDURE — 99024 POSTOP FOLLOW-UP VISIT: CPT | Mod: ,,, | Performed by: NURSE PRACTITIONER

## 2025-04-22 PROCEDURE — 99999 PR PBB SHADOW E&M-EST. PATIENT-LVL IV: CPT | Mod: PBBFAC,,, | Performed by: NURSE PRACTITIONER

## 2025-04-22 NOTE — PROGRESS NOTES
Surgical Oncology Clinic    Patient ID: [id number]     Chief Complaint: Post-op Evaluation ([facility]--PO: 3/31/25-ROBOTIC ESOPHAGECTOMY, TRANSHIATAL ([facility]25))      HPI:     Marcelino Al is a 79 y.o. male here today for  follow up status post transhiatal esophagectomy on 3/31/25. Incision is well approximated, healing well, and no signs of drainage. Patient is Doing well postoperatively. No problems with eating, bowel movements, voiding.   Pathology positive for adenocarcinoma, moderate to poorly differentiated. 1 of 14 lymph nodes positive.reviewed with patient. He is eating very well in addition to receiving tube feedings around 18- 20 hours per day at 70 mL/hr plus free water.       Current Outpatient Medications   Medication Instructions    apixaban (ELIQUIS) 5 mg, Oral, 2 times daily    atorvastatin (LIPITOR) 80 mg, Every morning    cholecalciferol (vitamin D3) (VITAMIN D3) 2,000 Units, Daily    cyanocobalamin (VITAMIN B-12) 1,000 mcg, Every morning    ezetimibe (ZETIA) 10 mg, Every morning    losartan (COZAAR) 25 mg, 2 times daily    multivitamin-minerals-lutein (MULTIVITAMIN 50 PLUS) Tab 1 tablet, Every morning    traZODone (DESYREL) 100 mg, Nightly       Review of patient's allergies indicates:  No Known Allergies     No care team member to display     Subjective:     Review of Systems   Constitutional:  Negative for chills and fever.   HENT: Negative.     Eyes:  Negative for visual disturbance.   Respiratory:  Negative for chest tightness and shortness of breath.    Cardiovascular:  Negative for chest pain.   Gastrointestinal: Negative.  Negative for nausea and vomiting.   Genitourinary:  Negative for difficulty urinating and hematuria.   Musculoskeletal: Negative.    Skin:  Negative for rash and wound.   Neurological: Negative.    Psychiatric/Behavioral: Negative.         12 point review of systems conducted, negative except as stated in the history of present illness. See HPI for  "details.    Objective:     Visit Vitals  /68 (BP Location: Left arm, Patient Position: Sitting)   Pulse 79   Ht 5' 9" (1.753 m)   Wt 82.6 kg (182 lb)   SpO2 95%   BMI 26.88 kg/m²       Physical Exam  Constitutional:       General: He is not in acute distress.  HENT:      Head: Atraumatic.      Nose: Nose normal.      Mouth/Throat:      Mouth: Mucous membranes are moist.   Eyes:      General: No scleral icterus.     Extraocular Movements: Extraocular movements intact.      Conjunctiva/sclera: Conjunctivae normal.   Neck:      Comments: Left neck surgical incision approximated with staples  Cardiovascular:      Rate and Rhythm: Normal rate and regular rhythm.      Pulses: Normal pulses.      Heart sounds: Normal heart sounds.   Pulmonary:      Effort: Pulmonary effort is normal. No respiratory distress.      Breath sounds: Normal breath sounds.   Abdominal:      General: Bowel sounds are normal. There is no distension.      Palpations: Abdomen is soft. There is no mass.      Tenderness: There is no abdominal tenderness. There is no guarding.      Comments: Upper abdominal midline incision intact, mild erythema to proximal portion. No dehiscence or drainage, J tube and site intact    Musculoskeletal:         General: No swelling.      Cervical back: Normal range of motion and neck supple. No rigidity.   Skin:     General: Skin is warm and dry.   Neurological:      General: No focal deficit present.      Mental Status: He is alert and oriented to person, place, and time.   Psychiatric:         Mood and Affect: Mood normal.         Behavior: Behavior normal.         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 Now status post transhiatal esophagectomy on 03/31/2025.     Plan:   Staples removed. No need for bandage over left neck surgical incision.   OK to continue shower daily. Avoid tub bath  Decrease tube feeding duration to 8 hour duration   Ok for soft food intake as " tolerated.   RTC 1 month     Rebecca Woo, NP-C  General Surgery   Surgical Oncology

## 2025-04-28 ENCOUNTER — OFFICE VISIT (OUTPATIENT)
Dept: HEMATOLOGY/ONCOLOGY | Facility: CLINIC | Age: 80
End: 2025-04-28
Payer: OTHER GOVERNMENT

## 2025-04-28 VITALS
HEIGHT: 69 IN | SYSTOLIC BLOOD PRESSURE: 129 MMHG | OXYGEN SATURATION: 96 % | DIASTOLIC BLOOD PRESSURE: 71 MMHG | HEART RATE: 76 BPM | BODY MASS INDEX: 27.47 KG/M2 | WEIGHT: 185.5 LBS | RESPIRATION RATE: 16 BRPM

## 2025-04-28 DIAGNOSIS — C16.0 GE JUNCTION CARCINOMA: Primary | ICD-10-CM

## 2025-04-28 PROCEDURE — 99215 OFFICE O/P EST HI 40 MIN: CPT | Mod: S$PBB,,, | Performed by: INTERNAL MEDICINE

## 2025-04-28 NOTE — PROGRESS NOTES
MEDICAL ONCOLOGY FOLLOW UP CONSULTATION NOTE    Patient ID: Marcelino Al is a 79 y.o. male.    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  Patient presents to our clinic today for further evaluation      Pathology:    A.  Gastric antrum biopsy:  Chronic gastritis    B.  GE junction biopsy x3:  Moderately differentiated adenocarcinoma in a background of Addison's esophagus, see synoptic.      Synoptic report:  Procedure.  Biopsy  Tumor site: GE junction  Histologic type: Adenocarcinoma  Histologic grade: G2 of G3: Moderately differentiated  Tumor extent: Invades lamina propria: At least  Lymphatic and or vascular invasion: Not identified  Perineural invasion: Not identified  Additional findings: Addison's esophagus    C.  Esophageal mass at 30 cm:  Moderately differentiated adenocarcinoma in a background of Addison's esophagus, see synoptic.    Synoptic report:     Histologic type: Adenocarcinoma  Histologic grade: G2 of G3: Moderately differentiated  Tumor extent: Suspicious for invasion into submucosa  Lymphatic and vascular invasion: Not identified  Perineural invasion: Not identified        Imaging:   10/12/2024 CT PET  Impression:     1. Hypermetabolic mass of the distal esophagus consistent with patient's known malignancy with a metastatic paraesophageal superior mediastinal lymph node.  2. Radiotracer uptake in nondisplaced fractures of the right 8th, 9th, and 10th ribs.  There is no underlying osseous metastasis    Past Medical History:   Diagnosis Date    Arthritis     bilat hands    BPH with urinary obstruction     CAD (coronary artery disease)     Elevated cholesterol     Elevated PSA     GE junction carcinoma     Hypertension     Sleep apnea      CPAP     Family History   Problem Relation Name Age of Onset    Heart disease Father Ankush Al     Cancer Sister Aden     Heart disease Brother Yvonne Trmichelle     Heart disease Brother Yvonne     Stroke Brother Yvonne      Social History     Socioeconomic History    Marital status:    Tobacco Use    Smoking status: Former     Current packs/day: 0.00     Average packs/day: 1 pack/day for 30.0 years (30.0 ttl pk-yrs)     Types: Cigarettes     Quit date: 11/15/1995     Years since quittin.4     Passive exposure: Past    Smokeless tobacco: Never   Substance and Sexual Activity    Alcohol use: Yes     Alcohol/week: 8.0 standard drinks of alcohol     Types: 8 Glasses of wine per week     Comment: couple glasses of wine a week    Drug use: Never    Sexual activity: Not Currently     Partners: Female     Birth control/protection: None     Social Drivers of Health     Financial Resource Strain: Patient Declined (2025)    Overall Financial Resource Strain (CARDIA)     Difficulty of Paying Living Expenses: Patient declined   Food Insecurity: Patient Declined (2025)    Hunger Vital Sign     Worried About Running Out of Food in the Last Year: Patient declined     Ran Out of Food in the Last Year: Patient declined   Transportation Needs: Patient Declined (2025)    PRAPARE - Transportation     Lack of Transportation (Medical): Patient declined     Lack of Transportation (Non-Medical): Patient declined   Stress: Patient Declined (2025)    [employer] - Occupational Stress Questionnaire     Feeling of Stress : Patient declined   Housing Stability: Patient Declined (2025)    Housing Stability Vital Sign     Unable to Pay for Housing in the Last Year: Patient declined     Homeless in the Last Year: Patient declined     Past Surgical History:   Procedure Laterality Date    CAROTID STENT      COLONOSCOPY      EYE SURGERY      HEMORRHOID SURGERY      INSERTION OF TUNNELED  CENTRAL VENOUS CATHETER (CVC) WITH SUBCUTANEOUS PORT Right 10/21/2024    Procedure: INSERTION, PORT-A-CATH (Do 1st Pt. has Oncologist appt. after);  Surgeon: Moy Ruvalcaba MD;  Location: [facility] OR;  Service: General;  Laterality: Right;  Right internal jugular    JOINT REPLACEMENT  2020    KNEE SURGERY      right knee replaced     PROSTATE BIOPSY      PROSTATE SURGERY  2021    ROBOT-ASSISTED TRANSHIATAL ESOPHAGECTOMY N/A 3/31/2025    Procedure: ROBOTIC ESOPHAGECTOMY, TRANSHIATAL;  Surgeon: Gabe Talbot MD;  Location: [facility];  Service: General;  Laterality: N/A;  lap robotic transhiatal esophagectomy with j tube placement // XX    TONSILLECTOMY           Review of systems:  Review of Systems   Constitutional:  Negative for activity change, appetite change, chills, diaphoresis, fatigue and unexpected weight change.   HENT:  Negative for congestion, facial swelling, hearing loss, mouth sores, trouble swallowing and voice change.    Eyes:  Negative for photophobia, pain, discharge and itching.   Respiratory:  Negative for apnea, cough, choking, chest tightness and shortness of breath.    Cardiovascular:  Negative for chest pain, palpitations and leg swelling.   Gastrointestinal:  Negative for abdominal distention, abdominal pain, anal bleeding and blood in stool.   Endocrine: Negative for cold intolerance, heat intolerance, polydipsia and polyphagia.   Genitourinary:  Negative for difficulty urinating, dysuria, flank pain and hematuria.   Musculoskeletal:  Negative for arthralgias, back pain, joint swelling, myalgias, neck pain and neck stiffness.   Skin:  Negative for color change, pallor and wound.   Allergic/Immunologic: Negative for environmental allergies, food allergies and immunocompromised state.   Neurological:  Negative for dizziness, seizures, facial asymmetry, speech difficulty, light-headedness, numbness and headaches.   Hematological:  Negative for adenopathy. Does not bruise/bleed easily.    Psychiatric/Behavioral:  Negative for agitation, behavioral problems, confusion, decreased concentration and sleep disturbance.            Physical Exam  Vitals and nursing note reviewed.   Constitutional:       General: He is not in acute distress.     Appearance: Normal appearance. He is not ill-appearing.   HENT:      Head: Normocephalic and atraumatic.      Nose: No congestion or rhinorrhea.   Eyes:      General: No scleral icterus.     Extraocular Movements: Extraocular movements intact.      Pupils: Pupils are equal, round, and reactive to light.   Cardiovascular:      Rate and Rhythm: Normal rate and regular rhythm.      Pulses: Normal pulses.      Heart sounds: Normal heart sounds. No murmur heard.     No gallop.   Pulmonary:      Effort: Pulmonary effort is normal. No respiratory distress.      Breath sounds: Normal breath sounds. No stridor. No wheezing or rhonchi.   Abdominal:      General: Bowel sounds are normal. There is no distension.      Palpations: There is no mass.      Tenderness: There is no abdominal tenderness. There is no guarding.   Musculoskeletal:         General: No swelling, tenderness, deformity or signs of injury. Normal range of motion.      Cervical back: Normal range of motion and neck supple. No rigidity. No muscular tenderness.      Right lower leg: No edema.      Left lower leg: No edema.   Skin:     General: Skin is warm.      Coloration: Skin is not jaundiced or pale.      Findings: No bruising or lesion.   Neurological:      General: No focal deficit present.      Mental Status: He is alert and oriented to person, place, and time.      Cranial Nerves: No cranial nerve deficit.      Sensory: No sensory deficit.      Motor: No weakness.      Gait: Gait normal.   Psychiatric:         Mood and Affect: Mood normal.         Behavior: Behavior normal.         Thought Content: Thought content normal.       Vitals:    04/28/25 0933   BP: 129/71   Pulse: 76   Resp: 16       Body  surface area is 2.02 meters squared.    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  I will obtain PET scan to complete staging and to rule out distant metastatic disease.  If no evidence of distant metastatic disease then plan would be for chemo XRT approach likely considering his age.  If localized disease then patient would need EUS staging for completion.  Will also make referral to Radiation Oncology.  == 10/22/24: EUS staging endoscopy scheduled this week by Dr Ruvalcaba. TMB discussion with plan for concurrent chemo-XRT. PET scan for restaging showed no evidence of distant metastatic disease. Referral to Rad-onc was already placed previously.  ==10/30/24:  Patient here today to discuss upcoming chemotherapy/immunotherapy. An extensive discussion was had which included a thorough discussion of potential side effect profile, risk versus benefits, and expected outcomes.  Risks, including but not limited to, possible hair loss, bone marrow damage, damage to body organs, allergic reactions, sterility, nausea/vomiting, constipation/diarrhea, sores in the mouth, secondary cancers, and rarely death were all discuss.  Specific side effects pertaining to their chemotherapy/immunotherapy medications were discussed as well.  The patient agrees with the plan and all questions and their support systems questions have been answered to their satisfaction.  Premedications were prescribed and patient was educated on appropriate usage.  Patient was educated on when to call, and given the number to call, or to notify the provider including but not limited to:  Persistent nausea and vomiting, dehydration, fever greater than 100.4 lasting over 1 hour induration or isolated feeders greater than 101, rash while on active chemotherapy or immunotherapy, severe pain or new onset pain not controlled by current pain  medication regimen. Patient is scheduled for initial visit with Dr. Bernal for XRT evaluation on 11/1/2024.  Will require evaluation, treatment planning/simulation so would not be able to start concurrent chemo/xrt next week.  Will likely be second week of November.  ==11/18/24: Discussed EUS with Dr. Bernal who discussed with Dr. Chavez, will not change treatment plan thus was deferred.  Pt started XRT this am, labs reviewed and he is cleared to start carboplatin/taxol weekly concurrent with xrt  == 11/25/24: tolerating chem/xrt well with no side effects at this time.   ==12/20/24: Pt has 15 more treatments of xrt, will finish on 12/23/24. Anc 1.7, tolerating chemotherapy and xrt well.  Having some mild constipation treating with prunes and mirilax, may also use senna prn. Labs reviewed, patient is cleared for chemotherapy. He reports very mild intermittent muscle cramps to bilateral legs at night will check magnesium with next labs due to carboplatin  ==12/9/24: Pt had nausea and vomiting after chemotherapy last week, will send out compazine to alternate with zofran. Instructed to notify clinic if persists. Labs reviewed, pt is cleared for chemotherapy  ==12/16/24: Pt to clinic will receive last cycle chemotherapy concurrent with xrt. He would like to see Dr. Hannah in [place], he has call in to [facility] care for referral authorization.  Tolerating chemotherapy well, today will be final treatment. Will order post treatment scan for 4 weeks and have pt rtc with md  == 1/27/25: S/p concurrent chemo-XRT. Doing well. Recent PET scan even though direct comparison not available but no evidence of metastatic disease. Patient has yet to see surgery and reports awaiting [facility] authorization before he sees them.  == 4/28/25: S/p concurrent chemo XRT followed by transhiatal esophagectomy which showed a small volume of residual adenocarcinoma without distinct mass formation, grade 2-3, partial response (score 2) with 1/14  lymph nodes positive for malignancy, ypT1a N1.  Will recommend postoperative systemic therapy with nivolumab which is category 1 per NCCN guidelines for residual disease with R0 resection. Will send PA request for Nivolumab 480 mg once every 4 weeks; continue until disease progression or unacceptable toxicity for up to 1 year.       Plan:  PA for Nivolumab        RTC in 1  week  for NP visit with labs for chemo-education      Total time spent in counseling and discussion about further management options including relevant lab work, treatment,  prognosis, medications and intended side effects was more than 60 minutes. More than 50% of the time was spent on counseling and coordination of care.  I spent a total of 60 minutes on the day of the visit.This includes face to face time and non-face to face time preparing to see the patient (eg, review of tests), Obtaining and/or reviewing separately obtained history, Documenting clinical information in the electronic or other health record, Independently interpreting resultsand communicating results to the patient/family/caregiver, or Care coordination.

## 2025-04-30 ENCOUNTER — OFFICE VISIT (OUTPATIENT)
Dept: SURGICAL ONCOLOGY | Facility: CLINIC | Age: 80
End: 2025-04-30
Payer: OTHER GOVERNMENT

## 2025-04-30 VITALS
WEIGHT: 182 LBS | OXYGEN SATURATION: 98 % | BODY MASS INDEX: 26.96 KG/M2 | SYSTOLIC BLOOD PRESSURE: 131 MMHG | HEART RATE: 74 BPM | HEIGHT: 69 IN | DIASTOLIC BLOOD PRESSURE: 67 MMHG

## 2025-04-30 DIAGNOSIS — C15.9 ESOPHAGEAL ADENOCARCINOMA: Primary | ICD-10-CM

## 2025-04-30 PROCEDURE — 99213 OFFICE O/P EST LOW 20 MIN: CPT | Mod: PBBFAC | Performed by: SURGERY

## 2025-04-30 PROCEDURE — 99999 PR PBB SHADOW E&M-EST. PATIENT-LVL III: CPT | Mod: PBBFAC,,, | Performed by: SURGERY

## 2025-04-30 PROCEDURE — 99024 POSTOP FOLLOW-UP VISIT: CPT | Mod: ,,, | Performed by: SURGERY

## 2025-04-30 NOTE — PROGRESS NOTES
Patient presents complaining of fracturing of his J-tube.  He is tolerating oral intake very well and taking supplements in addition to this.    J-tube was removed at the bedside, wound care instructions    Follow up as previously scheduled

## 2025-05-05 ENCOUNTER — CLINICAL SUPPORT (OUTPATIENT)
Dept: HEMATOLOGY/ONCOLOGY | Facility: CLINIC | Age: 80
End: 2025-05-05
Payer: OTHER GOVERNMENT

## 2025-05-05 ENCOUNTER — TELEPHONE (OUTPATIENT)
Dept: ADMINISTRATIVE | Facility: OTHER | Age: 80
End: 2025-05-05
Payer: OTHER GOVERNMENT

## 2025-05-05 VITALS
DIASTOLIC BLOOD PRESSURE: 74 MMHG | HEART RATE: 82 BPM | SYSTOLIC BLOOD PRESSURE: 126 MMHG | HEIGHT: 69 IN | OXYGEN SATURATION: 94 % | WEIGHT: 179.13 LBS | TEMPERATURE: 99 F | BODY MASS INDEX: 26.53 KG/M2 | RESPIRATION RATE: 16 BRPM

## 2025-05-05 DIAGNOSIS — C15.9 ESOPHAGEAL ADENOCARCINOMA: ICD-10-CM

## 2025-05-05 DIAGNOSIS — Z71.9 ENCOUNTER FOR EDUCATION: ICD-10-CM

## 2025-05-05 DIAGNOSIS — C16.0 GE JUNCTION CARCINOMA: Primary | ICD-10-CM

## 2025-05-05 LAB
ABS NRBC COUNT: 0 X 10 3/UL (ref 0–0.01)
ABSOLUTE BASOPHIL: 0.03 X 10 3/UL (ref 0–0.22)
ABSOLUTE EOSINOPHIL: 0.48 X 10 3/UL (ref 0.04–0.54)
ABSOLUTE IMMATURE GRAN: 0.03 X 10 3/UL (ref 0–0.04)
ABSOLUTE LYMPHOCYTE: 0.88 X 10 3/UL (ref 0.86–4.75)
ABSOLUTE MONOCYTE: 0.8 X 10 3/UL (ref 0.22–1.08)
ALBUMIN SERPL-MCNC: 4.2 G/DL (ref 3.5–5.2)
ALBUMIN/GLOB SERPL ELPH: 1.4 {RATIO} (ref 1–2.7)
ALP ISOS SERPL LEV INH-CCNC: 91 U/L (ref 40–130)
ALT (SGPT): 45 U/L (ref 0–41)
ANION GAP SERPL CALC-SCNC: 12 MMOL/L (ref 8–17)
AST SERPL-CCNC: 28 U/L (ref 0–40)
BASOPHILS NFR BLD: 0.5 % (ref 0.2–1.2)
BILIRUBIN, TOTAL: 0.56 MG/DL (ref 0–1.2)
BUN/CREAT SERPL: 11.5 (ref 6–20)
CALCIUM SERPL-MCNC: 9.4 MG/DL (ref 8.6–10.2)
CARBON DIOXIDE, CO2: 25 MMOL/L (ref 22–29)
CHLORIDE: 103 MMOL/L (ref 98–107)
CREAT SERPL-MCNC: 0.93 MG/DL (ref 0.7–1.2)
EOSINOPHIL NFR BLD: 8.4 % (ref 0.7–7)
GFR ESTIMATION: 82.49 ML/MIN/1.73M2
GLOBULIN: 3 G/DL (ref 1.5–4.5)
GLUCOSE: 96 MG/DL (ref 82–115)
HCT VFR BLD AUTO: 39.8 % (ref 42–52)
HGB BLD-MCNC: 13 G/DL (ref 14–18)
IMMATURE GRANULOCYTES: 0.5 % (ref 0–0.5)
LYMPHOCYTES NFR BLD: 15.4 % (ref 19.3–53.1)
MCH RBC QN AUTO: 31.1 PG (ref 27–32)
MCHC RBC AUTO-ENTMCNC: 32.7 G/DL (ref 32–36)
MCV RBC AUTO: 95.2 FL (ref 80–94)
MONOCYTES NFR BLD: 14 % (ref 4.7–12.5)
NEUTROPHILS # BLD AUTO: 3.48 X 10 3/UL (ref 2.15–7.56)
NEUTROPHILS NFR BLD: 61.2 % (ref 34–71.1)
NUCLEATED RED BLOOD CELLS: 0 /100 WBC (ref 0–0.2)
PLATELET # BLD AUTO: 174 X 10 3/UL (ref 135–400)
POTASSIUM: 4.3 MMOL/L (ref 3.5–5.1)
PROT SNV-MCNC: 7.2 G/DL (ref 6.4–8.3)
RBC # BLD AUTO: 4.18 X 10 6/UL (ref 4.7–6.1)
RDW-SD: 44 FL (ref 37–54)
SODIUM: 140 MMOL/L (ref 136–145)
SUB-OPTIMAL CRITERIA: NORMAL
TSH SERPL DL<=0.005 MIU/L-ACNC: 2.75 UIU/ML (ref 0.27–4.2)
UREA NITROGEN (BUN): 10.7 MG/DL (ref 8–23)
WBC # BLD: 5.7 X 10 3/UL (ref 4.3–10.8)

## 2025-05-05 PROCEDURE — G2211 COMPLEX E/M VISIT ADD ON: HCPCS | Mod: S$PBB,,, | Performed by: NURSE PRACTITIONER

## 2025-05-05 PROCEDURE — 99215 OFFICE O/P EST HI 40 MIN: CPT | Mod: S$PBB,,, | Performed by: NURSE PRACTITIONER

## 2025-05-05 NOTE — PROGRESS NOTES
MEDICAL ONCOLOGY FOLLOW UP CONSULTATION NOTE    Patient ID: Marcelino Al is a 79 y.o. male.    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  Patient presents to our clinic today for further evaluation      Pathology:    A.  Gastric antrum biopsy:  Chronic gastritis    B.  GE junction biopsy x3:  Moderately differentiated adenocarcinoma in a background of Addison's esophagus, see synoptic.      Synoptic report:  Procedure.  Biopsy  Tumor site: GE junction  Histologic type: Adenocarcinoma  Histologic grade: G2 of G3: Moderately differentiated  Tumor extent: Invades lamina propria: At least  Lymphatic and or vascular invasion: Not identified  Perineural invasion: Not identified  Additional findings: Addison's esophagus    C.  Esophageal mass at 30 cm:  Moderately differentiated adenocarcinoma in a background of Addison's esophagus, see synoptic.    Synoptic report:     Histologic type: Adenocarcinoma  Histologic grade: G2 of G3: Moderately differentiated  Tumor extent: Suspicious for invasion into submucosa  Lymphatic and vascular invasion: Not identified  Perineural invasion: Not identified        Imaging:   10/12/2024 CT PET  Impression:     1. Hypermetabolic mass of the distal esophagus consistent with patient's known malignancy with a metastatic paraesophageal superior mediastinal lymph node.  2. Radiotracer uptake in nondisplaced fractures of the right 8th, 9th, and 10th ribs.  There is no underlying osseous metastasis    Past Medical History:   Diagnosis Date    Arthritis     bilat hands    BPH with urinary obstruction     CAD (coronary artery disease)     Elevated cholesterol     Elevated PSA     GE junction carcinoma     Hypertension     Sleep apnea      CPAP     Family History   Problem Relation Name Age of Onset    Heart disease Father Ankush Al     Cancer Sister Aden     Heart disease Brother Yvonne Trmichelle     Heart disease Brother Yvonne     Stroke Brother Yvonne      Social History     Socioeconomic History    Marital status:    Tobacco Use    Smoking status: Former     Current packs/day: 0.00     Average packs/day: 1 pack/day for 30.0 years (30.0 ttl pk-yrs)     Types: Cigarettes     Quit date: 11/15/1995     Years since quittin.4     Passive exposure: Past    Smokeless tobacco: Never   Substance and Sexual Activity    Alcohol use: Yes     Alcohol/week: 8.0 standard drinks of alcohol     Types: 8 Glasses of wine per week     Comment: couple glasses of wine a week    Drug use: Never    Sexual activity: Not Currently     Partners: Female     Birth control/protection: None     Social Drivers of Health     Financial Resource Strain: Patient Declined (2025)    Overall Financial Resource Strain (CARDIA)     Difficulty of Paying Living Expenses: Patient declined   Food Insecurity: Patient Declined (2025)    Hunger Vital Sign     Worried About Running Out of Food in the Last Year: Patient declined     Ran Out of Food in the Last Year: Patient declined   Transportation Needs: Patient Declined (2025)    PRAPARE - Transportation     Lack of Transportation (Medical): Patient declined     Lack of Transportation (Non-Medical): Patient declined   Stress: Patient Declined (2025)    Citizen of [place] of Occupational Health - Occupational Stress Questionnaire     Feeling of Stress : Patient declined   Housing Stability: Patient Declined (2025)    Housing Stability Vital Sign     Unable to Pay for Housing in the Last Year: Patient declined     Homeless in the Last Year: Patient declined     Past Surgical History:   Procedure Laterality Date    CAROTID STENT      COLONOSCOPY      EYE SURGERY      HEMORRHOID SURGERY      INSERTION OF  TUNNELED CENTRAL VENOUS CATHETER (CVC) WITH SUBCUTANEOUS PORT Right 10/21/2024    Procedure: INSERTION, PORT-A-CATH (Do 1st Pt. has Oncologist appt. after);  Surgeon: Moy Ruvalcaba MD;  Location: [facility] OR;  Service: General;  Laterality: Right;  Right internal jugular    JOINT REPLACEMENT  2020    KNEE SURGERY      right knee replaced     PROSTATE BIOPSY      PROSTATE SURGERY  2021    ROBOT-ASSISTED TRANSHIATAL ESOPHAGECTOMY N/A 03/31/2025    Procedure: ROBOTIC ESOPHAGECTOMY, TRANSHIATAL;  Surgeon: Gabe Talbot MD;  Location: [facility] OR;  Service: General;  Laterality: N/A;  lap robotic transhiatal esophagectomy with j tube placement // XX    TONSILLECTOMY           Review of systems:  Review of Systems   Constitutional:  Negative for activity change, appetite change, chills, diaphoresis, fatigue and unexpected weight change.   HENT:  Negative for congestion, facial swelling, hearing loss, mouth sores, trouble swallowing and voice change.    Eyes:  Negative for photophobia, pain, discharge and itching.   Respiratory:  Negative for apnea, cough, choking, chest tightness and shortness of breath.    Cardiovascular:  Negative for chest pain, palpitations and leg swelling.   Gastrointestinal:  Negative for abdominal distention, abdominal pain, anal bleeding and blood in stool.   Endocrine: Negative for cold intolerance, heat intolerance, polydipsia and polyphagia.   Genitourinary:  Negative for difficulty urinating, dysuria, flank pain and hematuria.   Musculoskeletal:  Negative for arthralgias, back pain, joint swelling, myalgias, neck pain and neck stiffness.   Skin:  Negative for color change, pallor and wound.   Allergic/Immunologic: Negative for environmental allergies, food allergies and immunocompromised state.   Neurological:  Negative for dizziness, seizures, facial asymmetry, speech difficulty, light-headedness, numbness and headaches.   Hematological:  Negative for adenopathy. Does not bruise/bleed  easily.   Psychiatric/Behavioral:  Negative for agitation, behavioral problems, confusion, decreased concentration and sleep disturbance.            Physical Exam  Vitals and nursing note reviewed.   Constitutional:       General: He is not in acute distress.     Appearance: Normal appearance. He is not ill-appearing.   HENT:      Head: Normocephalic and atraumatic.      Nose: No congestion or rhinorrhea.   Eyes:      General: No scleral icterus.     Extraocular Movements: Extraocular movements intact.      Pupils: Pupils are equal, round, and reactive to light.   Cardiovascular:      Rate and Rhythm: Normal rate and regular rhythm.      Pulses: Normal pulses.      Heart sounds: Normal heart sounds. No murmur heard.     No gallop.   Pulmonary:      Effort: Pulmonary effort is normal. No respiratory distress.      Breath sounds: Normal breath sounds. No stridor. No wheezing or rhonchi.   Abdominal:      General: Bowel sounds are normal. There is no distension.      Palpations: There is no mass.      Tenderness: There is no abdominal tenderness. There is no guarding.   Musculoskeletal:         General: No swelling, tenderness, deformity or signs of injury. Normal range of motion.      Cervical back: Normal range of motion and neck supple. No rigidity. No muscular tenderness.      Right lower leg: No edema.      Left lower leg: No edema.   Skin:     General: Skin is warm.      Coloration: Skin is not jaundiced or pale.      Findings: No bruising or lesion.   Neurological:      General: No focal deficit present.      Mental Status: He is alert and oriented to person, place, and time.      Cranial Nerves: No cranial nerve deficit.      Sensory: No sensory deficit.      Motor: No weakness.      Gait: Gait normal.   Psychiatric:         Mood and Affect: Mood normal.         Behavior: Behavior normal.         Thought Content: Thought content normal.     Vitals:    05/05/25 1358   BP: 126/74   Pulse: 82   Resp: 16   Temp:  98.8 °F (37.1 °C)       Body surface area is 1.99 meters squared.    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  I will obtain PET scan to complete staging and to rule out distant metastatic disease.  If no evidence of distant metastatic disease then plan would be for chemo XRT approach likely considering his age.  If localized disease then patient would need EUS staging for completion.  Will also make referral to Radiation Oncology.  == 10/22/24: EUS staging endoscopy scheduled this week by Dr Ruvalcaba. TMB discussion with plan for concurrent chemo-XRT. PET scan for restaging showed no evidence of distant metastatic disease. Referral to Rad-onc was already placed previously.  ==10/30/24:  Patient here today to discuss upcoming chemotherapy/immunotherapy. An extensive discussion was had which included a thorough discussion of potential side effect profile, risk versus benefits, and expected outcomes.  Risks, including but not limited to, possible hair loss, bone marrow damage, damage to body organs, allergic reactions, sterility, nausea/vomiting, constipation/diarrhea, sores in the mouth, secondary cancers, and rarely death were all discuss.  Specific side effects pertaining to their chemotherapy/immunotherapy medications were discussed as well.  The patient agrees with the plan and all questions and their support systems questions have been answered to their satisfaction.  Premedications were prescribed and patient was educated on appropriate usage.  Patient was educated on when to call, and given the number to call, or to notify the provider including but not limited to:  Persistent nausea and vomiting, dehydration, fever greater than 100.4 lasting over 1 hour induration or isolated feeders greater than 101, rash while on active chemotherapy or immunotherapy, severe pain or new onset pain not  controlled by current pain medication regimen. Patient is scheduled for initial visit with Dr. Bernal for XRT evaluation on 11/1/2024.  Will require evaluation, treatment planning/simulation so would not be able to start concurrent chemo/xrt next week.  Will likely be second week of November.  ==11/18/24: Discussed EUS with Dr. Bernal who discussed with Dr. Chavez, will not change treatment plan thus was deferred.  Pt started XRT this am, labs reviewed and he is cleared to start carboplatin/taxol weekly concurrent with xrt  == 11/25/24: tolerating chem/xrt well with no side effects at this time.   ==12/20/24: Pt has 15 more treatments of xrt, will finish on 12/23/24. Anc 1.7, tolerating chemotherapy and xrt well.  Having some mild constipation treating with prunes and mirilax, may also use senna prn. Labs reviewed, patient is cleared for chemotherapy. He reports very mild intermittent muscle cramps to bilateral legs at night will check magnesium with next labs due to carboplatin  ==12/9/24: Pt had nausea and vomiting after chemotherapy last week, will send out compazine to alternate with zofran. Instructed to notify clinic if persists. Labs reviewed, pt is cleared for chemotherapy  ==12/16/24: Pt to clinic will receive last cycle chemotherapy concurrent with xrt. He would like to see Dr. Hannah in [place], he has call in to [facility] for referral authorization.  Tolerating chemotherapy well, today will be final treatment. Will order post treatment scan for 4 weeks and have pt rtc with md  == 1/27/25: S/p concurrent chemo-XRT. Doing well. Recent PET scan even though direct comparison not available but no evidence of metastatic disease. Patient has yet to see surgery and reports awaiting [facility] authorization before he sees them.  == 4/28/25: S/p concurrent chemo XRT followed by transhiatal esophagectomy which showed a small volume of residual adenocarcinoma without distinct mass formation, grade 2-3, partial  response (score 2) with 1/14 lymph nodes positive for malignancy, ypT1a N1.  Will recommend postoperative systemic therapy with nivolumab which is category 1 per NCCN guidelines for residual disease with R0 resection. Will send PA request for Nivolumab 480 mg once every 4 weeks; continue until disease progression or unacceptable toxicity for up to 1 year.   ==05/05/2025:  Patient here today to discuss upcoming chemotherapy/immunotherapy. An extensive discussion was had which included a thorough discussion of potential side effect profile, risk versus benefits, and expected outcomes.  Risks, including but not limited to, possible hair loss, bone marrow damage, damage to body organs, allergic reactions, sterility, nausea/vomiting, constipation/diarrhea, sores in the mouth, secondary cancers, and rarely death were all discuss.  Specific side effects pertaining to their chemotherapy/immunotherapy medications were discussed as well.  The patient agrees with the plan and all questions and their support systems questions have been answered to their satisfaction.  Premedications were prescribed and patient was educated on appropriate usage.  Patient was educated on when to call, and given the number to call, or to notify the provider including but not limited to:  Persistent nausea and vomiting, dehydration, fever greater than 100.4 lasting over 1 hour induration or isolated feeders greater than 101, rash while on active chemotherapy or immunotherapy, severe pain or new onset pain not controlled by current pain medication regimen.  Will start nivolumab on 5/13/2025 pending insurance approval      Plan:  PA for Nivolumab  -pending  Will plan to start on 5/13/20025 pending insurance approval  [facility]bc cmp tsh on 5/5        Total time spent in counseling and discussion about further management options including relevant lab work, treatment,  prognosis, medications and intended side effects was more than 60 minutes. More than 50% of  the time was spent on counseling and coordination of care.  I spent a total of 60 minutes on the day of the visit.This includes face to face time and non-face to face time preparing to see the patient (eg, review of tests), Obtaining and/or reviewing separately obtained history, Documenting clinical information in the electronic or other health record, Independently interpreting resultsand communicating results to the patient/family/caregiver, or Care coordination.

## 2025-05-06 ENCOUNTER — TELEPHONE (OUTPATIENT)
Dept: HEMATOLOGY/ONCOLOGY | Facility: CLINIC | Age: 80
End: 2025-05-06
Payer: OTHER GOVERNMENT

## 2025-05-06 NOTE — TELEPHONE ENCOUNTER
----- Message from JULIANNA Rose sent at 5/6/2025 10:02 AM CDT -----    ----- Message -----  From: Griselda Jeffery  Sent: 5/6/2025   9:38 AM CDT  To: [facility] Staff    [facility] in regards to will need charts notes and progress notes with order for monthly labs.  Please fax to [phone] sreekanth Pak.

## 2025-05-12 NOTE — PROGRESS NOTES
MEDICAL ONCOLOGY FOLLOW UP CONSULTATION NOTE    Patient ID: Marcelino Al is a 79 y.o. male.    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  Patient presents to our clinic today for further evaluation      Pathology:    A.  Gastric antrum biopsy:  Chronic gastritis    B.  GE junction biopsy x3:  Moderately differentiated adenocarcinoma in a background of Addison's esophagus, see synoptic.      Synoptic report:  Procedure.  Biopsy  Tumor site: GE junction  Histologic type: Adenocarcinoma  Histologic grade: G2 of G3: Moderately differentiated  Tumor extent: Invades lamina propria: At least  Lymphatic and or vascular invasion: Not identified  Perineural invasion: Not identified  Additional findings: Addison's esophagus    C.  Esophageal mass at 30 cm:  Moderately differentiated adenocarcinoma in a background of Addison's esophagus, see synoptic.    Synoptic report:     Histologic type: Adenocarcinoma  Histologic grade: G2 of G3: Moderately differentiated  Tumor extent: Suspicious for invasion into submucosa  Lymphatic and vascular invasion: Not identified  Perineural invasion: Not identified        Imaging:   10/12/2024 CT PET  Impression:     1. Hypermetabolic mass of the distal esophagus consistent with patient's known malignancy with a metastatic paraesophageal superior mediastinal lymph node.  2. Radiotracer uptake in nondisplaced fractures of the right 8th, 9th, and 10th ribs.  There is no underlying osseous metastasis    Past Medical History:   Diagnosis Date    Arthritis     bilat hands    BPH with urinary obstruction     CAD (coronary artery disease)     Elevated cholesterol     Elevated PSA     GE junction carcinoma     Hypertension     Sleep apnea      CPAP     Family History   Problem Relation Name Age of Onset    Heart disease Father Ankush Al     Cancer Sister Aden     Heart disease Brother Yvonne Trmichelle     Heart disease Brother Yvonne     Stroke Brother Yvonne      Social History     Socioeconomic History    Marital status:    Tobacco Use    Smoking status: Former     Current packs/day: 0.00     Average packs/day: 1 pack/day for 30.0 years (30.0 ttl pk-yrs)     Types: Cigarettes     Quit date: 11/15/1995     Years since quittin.5     Passive exposure: Past    Smokeless tobacco: Never   Substance and Sexual Activity    Alcohol use: Yes     Alcohol/week: 8.0 standard drinks of alcohol     Types: 8 Glasses of wine per week     Comment: couple glasses of wine a week    Drug use: Never    Sexual activity: Not Currently     Partners: Female     Birth control/protection: None     Social Drivers of Health     Financial Resource Strain: Patient Declined (2025)    Overall Financial Resource Strain (CARDIA)     Difficulty of Paying Living Expenses: Patient declined   Food Insecurity: Patient Declined (2025)    Hunger Vital Sign     Worried About Running Out of Food in the Last Year: Patient declined     Ran Out of Food in the Last Year: Patient declined   Transportation Needs: Patient Declined (2025)    PRAPARE - Transportation     Lack of Transportation (Medical): Patient declined     Lack of Transportation (Non-Medical): Patient declined   Stress: Patient Declined (2025)    [employer] - Occupational Stress Questionnaire     Feeling of Stress : Patient declined   Housing Stability: Patient Declined (2025)    Housing Stability Vital Sign     Unable to Pay for Housing in the Last Year: Patient declined     Homeless in the Last Year: Patient declined     Past Surgical History:   Procedure Laterality Date    CAROTID STENT      COLONOSCOPY      EYE SURGERY      HEMORRHOID SURGERY      INSERTION OF  TUNNELED CENTRAL VENOUS CATHETER (CVC) WITH SUBCUTANEOUS PORT Right 10/21/2024    Procedure: INSERTION, PORT-A-CATH (Do 1st Pt. has Oncologist appt. after);  Surgeon: Moy Ruvalcaba MD;  Location: [facility] OR;  Service: General;  Laterality: Right;  Right internal jugular    JOINT REPLACEMENT  2020    KNEE SURGERY      right knee replaced     PROSTATE BIOPSY      PROSTATE SURGERY  2021    ROBOT-ASSISTED TRANSHIATAL ESOPHAGECTOMY N/A 03/31/2025    Procedure: ROBOTIC ESOPHAGECTOMY, TRANSHIATAL;  Surgeon: Gabe Talbot MD;  Location: [facility] OR;  Service: General;  Laterality: N/A;  lap robotic transhiatal esophagectomy with j tube placement // XX    TONSILLECTOMY           Review of systems:  Review of Systems   Constitutional:  Negative for activity change, appetite change, chills, diaphoresis, fatigue and unexpected weight change.   HENT:  Negative for congestion, facial swelling, hearing loss, mouth sores, trouble swallowing and voice change.    Eyes:  Negative for photophobia, pain, discharge and itching.   Respiratory:  Negative for apnea, cough, choking, chest tightness and shortness of breath.    Cardiovascular:  Negative for chest pain, palpitations and leg swelling.   Gastrointestinal:  Negative for abdominal distention, abdominal pain, anal bleeding and blood in stool.   Endocrine: Negative for cold intolerance, heat intolerance, polydipsia and polyphagia.   Genitourinary:  Negative for difficulty urinating, dysuria, flank pain and hematuria.   Musculoskeletal:  Negative for arthralgias, back pain, joint swelling, myalgias, neck pain and neck stiffness.   Skin:  Negative for color change, pallor and wound.   Allergic/Immunologic: Negative for environmental allergies, food allergies and immunocompromised state.   Neurological:  Negative for dizziness, seizures, facial asymmetry, speech difficulty, light-headedness, numbness and headaches.   Hematological:  Negative for adenopathy. Does not bruise/bleed  easily.   Psychiatric/Behavioral:  Negative for agitation, behavioral problems, confusion, decreased concentration and sleep disturbance.            Physical Exam  Vitals and nursing note reviewed.   Constitutional:       General: He is not in acute distress.     Appearance: Normal appearance. He is not ill-appearing.   HENT:      Head: Normocephalic and atraumatic.      Nose: No congestion or rhinorrhea.   Eyes:      General: No scleral icterus.     Extraocular Movements: Extraocular movements intact.      Pupils: Pupils are equal, round, and reactive to light.   Cardiovascular:      Rate and Rhythm: Normal rate and regular rhythm.      Pulses: Normal pulses.      Heart sounds: Normal heart sounds. No murmur heard.     No gallop.   Pulmonary:      Effort: Pulmonary effort is normal. No respiratory distress.      Breath sounds: Normal breath sounds. No stridor. No wheezing or rhonchi.   Abdominal:      General: Bowel sounds are normal. There is no distension.      Palpations: There is no mass.      Tenderness: There is no abdominal tenderness. There is no guarding.   Musculoskeletal:         General: No swelling, tenderness, deformity or signs of injury. Normal range of motion.      Cervical back: Normal range of motion and neck supple. No rigidity. No muscular tenderness.      Right lower leg: No edema.      Left lower leg: No edema.   Skin:     General: Skin is warm.      Coloration: Skin is not jaundiced or pale.      Findings: No bruising or lesion.   Neurological:      General: No focal deficit present.      Mental Status: He is alert and oriented to person, place, and time.      Cranial Nerves: No cranial nerve deficit.      Sensory: No sensory deficit.      Motor: No weakness.      Gait: Gait normal.   Psychiatric:         Mood and Affect: Mood normal.         Behavior: Behavior normal.         Thought Content: Thought content normal.     There were no vitals filed for this visit.      There is no height or  weight on file to calculate BSA.    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  I will obtain PET scan to complete staging and to rule out distant metastatic disease.  If no evidence of distant metastatic disease then plan would be for chemo XRT approach likely considering his age.  If localized disease then patient would need EUS staging for completion.  Will also make referral to Radiation Oncology.  == 10/22/24: EUS staging endoscopy scheduled this week by Dr Ruvalcaba. TMB discussion with plan for concurrent chemo-XRT. PET scan for restaging showed no evidence of distant metastatic disease. Referral to Rad-onc was already placed previously.  ==10/30/24:  Patient here today to discuss upcoming chemotherapy/immunotherapy. An extensive discussion was had which included a thorough discussion of potential side effect profile, risk versus benefits, and expected outcomes.  Risks, including but not limited to, possible hair loss, bone marrow damage, damage to body organs, allergic reactions, sterility, nausea/vomiting, constipation/diarrhea, sores in the mouth, secondary cancers, and rarely death were all discuss.  Specific side effects pertaining to their chemotherapy/immunotherapy medications were discussed as well.  The patient agrees with the plan and all questions and their support systems questions have been answered to their satisfaction.  Premedications were prescribed and patient was educated on appropriate usage.  Patient was educated on when to call, and given the number to call, or to notify the provider including but not limited to:  Persistent nausea and vomiting, dehydration, fever greater than 100.4 lasting over 1 hour induration or isolated feeders greater than 101, rash while on active chemotherapy or immunotherapy, severe pain or new onset pain not controlled by current pain medication  regimen. Patient is scheduled for initial visit with Dr. Bernal for XRT evaluation on 11/1/2024.  Will require evaluation, treatment planning/simulation so would not be able to start concurrent chemo/xrt next week.  Will likely be second week of November.  ==11/18/24: Discussed EUS with Dr. Bernal who discussed with Dr. Chavez, will not change treatment plan thus was deferred.  Pt started XRT this am, labs reviewed and he is cleared to start carboplatin/taxol weekly concurrent with xrt  == 11/25/24: tolerating chem/xrt well with no side effects at this time.   ==12/20/24: Pt has 15 more treatments of xrt, will finish on 12/23/24. Anc 1.7, tolerating chemotherapy and xrt well.  Having some mild constipation treating with prunes and mirilax, may also use senna prn. Labs reviewed, patient is cleared for chemotherapy. He reports very mild intermittent muscle cramps to bilateral legs at night will check magnesium with next labs due to carboplatin  ==12/9/24: Pt had nausea and vomiting after chemotherapy last week, will send out compazine to alternate with zofran. Instructed to notify clinic if persists. Labs reviewed, pt is cleared for chemotherapy  ==12/16/24: Pt to clinic will receive last cycle chemotherapy concurrent with xrt. He would like to see Dr. Hannah in [place], he has call in to [facility] care for referral authorization.  Tolerating chemotherapy well, today will be final treatment. Will order post treatment scan for 4 weeks and have pt rtc with md  == 1/27/25: S/p concurrent chemo-XRT. Doing well. Recent PET scan even though direct comparison not available but no evidence of metastatic disease. Patient has yet to see surgery and reports awaiting [facility] authorization before he sees them.  == 4/28/25: S/p concurrent chemo XRT followed by transhiatal esophagectomy which showed a small volume of residual adenocarcinoma without distinct mass formation, grade 2-3, partial response (score 2) with 1/14 lymph  nodes positive for malignancy, ypT1a N1.  Will recommend postoperative systemic therapy with nivolumab which is category 1 per NCCN guidelines for residual disease with R0 resection. Will send PA request for Nivolumab 480 mg once every 4 weeks; continue until disease progression or unacceptable toxicity for up to 1 year.   ==05/05/2025:  Patient here today to discuss upcoming chemotherapy/immunotherapy. An extensive discussion was had which included a thorough discussion of potential side effect profile, risk versus benefits, and expected outcomes.  Risks, including but not limited to, possible hair loss, bone marrow damage, damage to body organs, allergic reactions, sterility, nausea/vomiting, constipation/diarrhea, sores in the mouth, secondary cancers, and rarely death were all discuss.  Specific side effects pertaining to their chemotherapy/immunotherapy medications were discussed as well.  The patient agrees with the plan and all questions and their support systems questions have been answered to their satisfaction.  Premedications were prescribed and patient was educated on appropriate usage.  Patient was educated on when to call, and given the number to call, or to notify the provider including but not limited to:  Persistent nausea and vomiting, dehydration, fever greater than 100.4 lasting over 1 hour induration or isolated feeders greater than 101, rash while on active chemotherapy or immunotherapy, severe pain or new onset pain not controlled by current pain medication regimen.  Will start nivolumab on 5/13/2025 pending insurance approval  ==05/13/2025: Nivolumab still pending insurance approval, labs reviewed, pt is cleared to start once approved by insurance, order signed      Plan:  PA for Nivolumab  -pending  Pt is cleared to start nivolumab once insurance approves  Rtc next appt 3 weeks after starting nivolumab cbc cmp        Total time spent in counseling and discussion about further management  options including relevant lab work, treatment,  prognosis, medications and intended side effects was more than 60 minutes. More than 50% of the time was spent on counseling and coordination of care.  I spent a total of 60 minutes on the day of the visit.This includes face to face time and non-face to face time preparing to see the patient (eg, review of tests), Obtaining and/or reviewing separately obtained history, Documenting clinical information in the electronic or other health record, Independently interpreting resultsand communicating results to the patient/family/caregiver, or Care coordination.

## 2025-05-13 ENCOUNTER — OFFICE VISIT (OUTPATIENT)
Dept: HEMATOLOGY/ONCOLOGY | Facility: CLINIC | Age: 80
End: 2025-05-13
Payer: OTHER GOVERNMENT

## 2025-05-13 VITALS
OXYGEN SATURATION: 93 % | WEIGHT: 181.5 LBS | HEART RATE: 74 BPM | BODY MASS INDEX: 24.58 KG/M2 | HEIGHT: 72 IN | RESPIRATION RATE: 16 BRPM | DIASTOLIC BLOOD PRESSURE: 73 MMHG | TEMPERATURE: 98 F | SYSTOLIC BLOOD PRESSURE: 113 MMHG

## 2025-05-13 DIAGNOSIS — C16.0 GE JUNCTION CARCINOMA: Primary | ICD-10-CM

## 2025-05-13 DIAGNOSIS — Z71.9 ENCOUNTER FOR EDUCATION: ICD-10-CM

## 2025-05-13 DIAGNOSIS — C15.9 ESOPHAGEAL ADENOCARCINOMA: ICD-10-CM

## 2025-05-13 PROCEDURE — 99215 OFFICE O/P EST HI 40 MIN: CPT | Mod: S$PBB,,, | Performed by: NURSE PRACTITIONER

## 2025-05-13 RX ORDER — SODIUM CHLORIDE 0.9 % (FLUSH) 0.9 %
10 SYRINGE (ML) INJECTION
OUTPATIENT
Start: 2025-05-13

## 2025-05-13 RX ORDER — EPINEPHRINE 0.3 MG/.3ML
0.3 INJECTION SUBCUTANEOUS ONCE AS NEEDED
OUTPATIENT
Start: 2025-05-13

## 2025-05-13 RX ORDER — HEPARIN 100 UNIT/ML
500 SYRINGE INTRAVENOUS
OUTPATIENT
Start: 2025-05-13

## 2025-05-13 RX ORDER — DIPHENHYDRAMINE HYDROCHLORIDE 50 MG/ML
50 INJECTION, SOLUTION INTRAMUSCULAR; INTRAVENOUS ONCE AS NEEDED
OUTPATIENT
Start: 2025-05-13

## 2025-05-16 ENCOUNTER — TELEPHONE (OUTPATIENT)
Dept: HEMATOLOGY/ONCOLOGY | Facility: CLINIC | Age: 80
End: 2025-05-16
Payer: OTHER GOVERNMENT

## 2025-05-16 NOTE — TELEPHONE ENCOUNTER
----- Message from Rosa sent at 5/16/2025  9:19 AM CDT -----  Contact: pt  Pt calling about status of authorization for  immune therapy and can be reached at [phone] Thanks,

## 2025-05-20 ENCOUNTER — OFFICE VISIT (OUTPATIENT)
Dept: SURGICAL ONCOLOGY | Facility: CLINIC | Age: 80
End: 2025-05-20
Payer: OTHER GOVERNMENT

## 2025-05-20 VITALS
HEART RATE: 84 BPM | HEIGHT: 69 IN | BODY MASS INDEX: 26.22 KG/M2 | OXYGEN SATURATION: 96 % | DIASTOLIC BLOOD PRESSURE: 74 MMHG | SYSTOLIC BLOOD PRESSURE: 117 MMHG | WEIGHT: 177 LBS

## 2025-05-20 DIAGNOSIS — C15.9 ESOPHAGEAL ADENOCARCINOMA: Primary | ICD-10-CM

## 2025-05-20 PROCEDURE — 99999 PR PBB SHADOW E&M-EST. PATIENT-LVL IV: CPT | Mod: PBBFAC,,, | Performed by: NURSE PRACTITIONER

## 2025-05-20 PROCEDURE — 99214 OFFICE O/P EST MOD 30 MIN: CPT | Mod: PBBFAC | Performed by: NURSE PRACTITIONER

## 2025-05-20 PROCEDURE — 99024 POSTOP FOLLOW-UP VISIT: CPT | Mod: ,,, | Performed by: NURSE PRACTITIONER

## 2025-05-20 NOTE — PROGRESS NOTES
Surgical Oncology Clinic    Patient ID: [id number]     Chief Complaint: Post-op Evaluation ([facility] Pt--PO: 3/31/25-ROBOTIC ESOPHAGECTOMY, TRANSHIATAL ([facility] Auth Expires 09/01/2025))      HPI:     Marcelino Al is a 79 y.o. male here today for  follow up status post transhiatal esophagectomy on 3/31/25. J tube was removed in office 4/30 without incident. Post operatively he is doing remarkably well. Oral intake is adequate with a few pound weight loss since surgery. Incisions have nearly healed. There was an intermittent pain to right upper quadrant. No alarming findings on physical exam.       Current Outpatient Medications   Medication Instructions    apixaban (ELIQUIS) 5 mg, Oral, 2 times daily    atorvastatin (LIPITOR) 80 mg, Every morning    cholecalciferol (vitamin D3) (VITAMIN D3) 2,000 Units, Daily    cyanocobalamin (VITAMIN B-12) 1,000 mcg, Every morning    ezetimibe (ZETIA) 10 mg, Every morning    losartan (COZAAR) 25 mg, 2 times daily    multivitamin-minerals-lutein (MULTIVITAMIN 50 PLUS) Tab 1 tablet, Every morning    traZODone (DESYREL) 100 mg, Nightly       Review of patient's allergies indicates:  No Known Allergies     No care team member to display     Subjective:     Review of Systems   Constitutional:  Negative for chills and fever.   HENT: Negative.     Eyes:  Negative for visual disturbance.   Respiratory:  Negative for chest tightness and shortness of breath.    Cardiovascular:  Negative for chest pain.   Gastrointestinal: Negative.  Negative for nausea and vomiting.   Genitourinary:  Negative for difficulty urinating and hematuria.   Musculoskeletal: Negative.    Skin:  Negative for rash and wound.   Neurological: Negative.    Psychiatric/Behavioral: Negative.         12 point review of systems conducted, negative except as stated in the history of present illness. See HPI for details.    Objective:     Visit Vitals  /74 (BP Location: Left arm, Patient Position: Sitting)   Pulse 84  "  Ht 5' 9" (1.753 m)   Wt 80.3 kg (177 lb)   SpO2 96%   BMI 26.14 kg/m²       Physical Exam  Constitutional:       General: He is not in acute distress.  HENT:      Head: Atraumatic.      Nose: Nose normal.      Mouth/Throat:      Mouth: Mucous membranes are moist.   Eyes:      General: No scleral icterus.     Extraocular Movements: Extraocular movements intact.      Conjunctiva/sclera: Conjunctivae normal.   Neck:      Comments: Left neck surgical incision healed  Cardiovascular:      Rate and Rhythm: Normal rate and regular rhythm.      Pulses: Normal pulses.      Heart sounds: Normal heart sounds.   Pulmonary:      Effort: Pulmonary effort is normal. No respiratory distress.      Breath sounds: Normal breath sounds.   Abdominal:      General: Bowel sounds are normal. There is no distension.      Palpations: Abdomen is soft.      Tenderness: There is no abdominal tenderness.   Musculoskeletal:         General: No swelling.      Cervical back: Normal range of motion and neck supple. No rigidity.   Skin:     General: Skin is warm and dry.   Neurological:      General: No focal deficit present.      Mental Status: He is alert and oriented to person, place, and time.   Psychiatric:         Mood and Affect: Mood normal.         Behavior: Behavior normal.         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 Now status post transhiatal esophagectomy on 03/31/2025.     Plan:   Continue present diet at home.   Ok to increase physical activity as tolerated.   Pending initiation of immunotherapy when insurance approval given per [facility]. Per patient, plan is for once monthly dosing x1 year     RTC in 6 months     Rebecca Woo, TERIC  General Surgery   Surgical Oncology       "

## 2025-05-28 ENCOUNTER — TELEPHONE (OUTPATIENT)
Dept: HEMATOLOGY/ONCOLOGY | Facility: CLINIC | Age: 80
End: 2025-05-28
Payer: OTHER GOVERNMENT

## 2025-05-28 NOTE — TELEPHONE ENCOUNTER
Copied from CRM #[id number]. Topic: Appointments - Appointment Confirmation  >> May 27, 2025  8:22 AM Ernst wrote:  ..Type:  Patient Requesting Call    Who Called:Marcelino Al  Does the patient know what this is regarding?:Is his appointment on 6/3/25 approved by [facility]? Said he don't wanna drive for nothing, he called twice last week and never got a call back   Would the patient rather a call back or a response via [facility]? Call   Best Call Back Number:[phone] (home)    Additional Information:

## 2025-06-03 ENCOUNTER — OFFICE VISIT (OUTPATIENT)
Dept: HEMATOLOGY/ONCOLOGY | Facility: CLINIC | Age: 80
End: 2025-06-03
Payer: OTHER GOVERNMENT

## 2025-06-03 VITALS
WEIGHT: 177.88 LBS | RESPIRATION RATE: 16 BRPM | HEART RATE: 95 BPM | BODY MASS INDEX: 26.34 KG/M2 | HEIGHT: 69 IN | OXYGEN SATURATION: 98 % | DIASTOLIC BLOOD PRESSURE: 66 MMHG | TEMPERATURE: 95 F | SYSTOLIC BLOOD PRESSURE: 106 MMHG

## 2025-06-03 DIAGNOSIS — C16.0 GE JUNCTION CARCINOMA: Primary | ICD-10-CM

## 2025-06-03 DIAGNOSIS — C15.9 ESOPHAGEAL ADENOCARCINOMA: ICD-10-CM

## 2025-06-03 PROCEDURE — 99215 OFFICE O/P EST HI 40 MIN: CPT | Mod: S$PBB,,, | Performed by: NURSE PRACTITIONER

## 2025-06-03 PROCEDURE — G2211 COMPLEX E/M VISIT ADD ON: HCPCS | Mod: S$PBB,,, | Performed by: NURSE PRACTITIONER

## 2025-06-30 ENCOUNTER — TELEPHONE (OUTPATIENT)
Dept: HEMATOLOGY/ONCOLOGY | Facility: CLINIC | Age: 80
End: 2025-06-30
Payer: OTHER GOVERNMENT

## 2025-07-01 ENCOUNTER — TELEPHONE (OUTPATIENT)
Dept: HEMATOLOGY/ONCOLOGY | Facility: CLINIC | Age: 80
End: 2025-07-01

## 2025-07-01 DIAGNOSIS — C16.0 GE JUNCTION CARCINOMA: Primary | ICD-10-CM

## 2025-07-01 NOTE — TELEPHONE ENCOUNTER
Copied from CRM #[id number]. Topic: General Inquiry - Return Call  >> Jul 1, 2025  9:17 AM Sheree wrote:  Type:  Patient Requesting Call    Who Called:Mellisa [facility] Brenda Kauffman   Does the patient know what this is regarding?:calling on behalf of the pt to speak with nurse regarding lab orders  Would the patient rather a call back or a response via [facility]? call  Best Call Back Number: [phone]  Additional Information: fax# [phone] attn Mellisa

## 2025-07-07 ENCOUNTER — TELEPHONE (OUTPATIENT)
Dept: HEMATOLOGY/ONCOLOGY | Facility: CLINIC | Age: 80
End: 2025-07-07
Payer: OTHER GOVERNMENT

## 2025-07-08 ENCOUNTER — OFFICE VISIT (OUTPATIENT)
Dept: HEMATOLOGY/ONCOLOGY | Facility: CLINIC | Age: 80
End: 2025-07-08
Payer: OTHER GOVERNMENT

## 2025-07-08 VITALS
OXYGEN SATURATION: 95 % | TEMPERATURE: 98 F | RESPIRATION RATE: 16 BRPM | HEART RATE: 82 BPM | SYSTOLIC BLOOD PRESSURE: 105 MMHG | BODY MASS INDEX: 25.92 KG/M2 | HEIGHT: 69 IN | DIASTOLIC BLOOD PRESSURE: 60 MMHG | WEIGHT: 175 LBS

## 2025-07-08 DIAGNOSIS — C16.0 GE JUNCTION CARCINOMA: Primary | ICD-10-CM

## 2025-07-08 PROCEDURE — 99215 OFFICE O/P EST HI 40 MIN: CPT | Mod: S$PBB,,, | Performed by: NURSE PRACTITIONER

## 2025-07-08 PROCEDURE — G2211 COMPLEX E/M VISIT ADD ON: HCPCS | Mod: ,,, | Performed by: NURSE PRACTITIONER

## 2025-07-08 RX ORDER — EPINEPHRINE 0.3 MG/.3ML
0.3 INJECTION SUBCUTANEOUS ONCE AS NEEDED
OUTPATIENT
Start: 2025-07-08

## 2025-07-08 RX ORDER — HEPARIN 100 UNIT/ML
500 SYRINGE INTRAVENOUS
OUTPATIENT
Start: 2025-07-08

## 2025-07-08 RX ORDER — DIPHENHYDRAMINE HYDROCHLORIDE 50 MG/ML
50 INJECTION, SOLUTION INTRAMUSCULAR; INTRAVENOUS ONCE AS NEEDED
OUTPATIENT
Start: 2025-07-08

## 2025-07-08 RX ORDER — SODIUM CHLORIDE 0.9 % (FLUSH) 0.9 %
10 SYRINGE (ML) INJECTION
OUTPATIENT
Start: 2025-07-08

## 2025-07-08 NOTE — PROGRESS NOTES
MEDICAL ONCOLOGY FOLLOW UP CONSULTATION NOTE    Patient ID: Marcelino Al is a 79 y.o. male.    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  Patient presents to our clinic today for further evaluation      Pathology:    A.  Gastric antrum biopsy:  Chronic gastritis    B.  GE junction biopsy x3:  Moderately differentiated adenocarcinoma in a background of Addison's esophagus, see synoptic.      Synoptic report:  Procedure.  Biopsy  Tumor site: GE junction  Histologic type: Adenocarcinoma  Histologic grade: G2 of G3: Moderately differentiated  Tumor extent: Invades lamina propria: At least  Lymphatic and or vascular invasion: Not identified  Perineural invasion: Not identified  Additional findings: Addison's esophagus    C.  Esophageal mass at 30 cm:  Moderately differentiated adenocarcinoma in a background of Addison's esophagus, see synoptic.    Synoptic report:     Histologic type: Adenocarcinoma  Histologic grade: G2 of G3: Moderately differentiated  Tumor extent: Suspicious for invasion into submucosa  Lymphatic and vascular invasion: Not identified  Perineural invasion: Not identified        Imaging:   10/12/2024 CT PET  Impression:     1. Hypermetabolic mass of the distal esophagus consistent with patient's known malignancy with a metastatic paraesophageal superior mediastinal lymph node.  2. Radiotracer uptake in nondisplaced fractures of the right 8th, 9th, and 10th ribs.  There is no underlying osseous metastasis    Past Medical History:   Diagnosis Date    Arthritis     bilat hands    BPH with urinary obstruction     CAD (coronary artery disease)     Elevated cholesterol     Elevated PSA     GE junction carcinoma     Hypertension     Sleep apnea      CPAP     Family History   Problem Relation Name Age of Onset    Heart disease Father Ankush Al     Cancer Sister Aden     Heart disease Brother Yvonne Trmichelle     Heart disease Brother Yvonne     Stroke Brother Yvonne      Social History     Socioeconomic History    Marital status:    Tobacco Use    Smoking status: Former     Current packs/day: 0.00     Average packs/day: 1 pack/day for 30.0 years (30.0 ttl pk-yrs)     Types: Cigarettes     Quit date: 11/15/1995     Years since quittin.6     Passive exposure: Past    Smokeless tobacco: Never   Substance and Sexual Activity    Alcohol use: Yes     Alcohol/week: 8.0 standard drinks of alcohol     Types: 8 Glasses of wine per week     Comment: couple glasses of wine a week    Drug use: Never    Sexual activity: Not Currently     Partners: Female     Birth control/protection: None     Social Drivers of Health     Financial Resource Strain: Patient Declined (2025)    Overall Financial Resource Strain (CARDIA)     Difficulty of Paying Living Expenses: Patient declined   Food Insecurity: Patient Declined (2025)    Hunger Vital Sign     Worried About Running Out of Food in the Last Year: Patient declined     Ran Out of Food in the Last Year: Patient declined   Transportation Needs: Patient Declined (2025)    PRAPARE - Transportation     Lack of Transportation (Medical): Patient declined     Lack of Transportation (Non-Medical): Patient declined   Stress: Patient Declined (2025)    [employer] [place] [employer] - Occupational Stress Questionnaire     Feeling of Stress : Patient declined   Housing Stability: Patient Declined (2025)    Housing Stability Vital Sign     Unable to Pay for Housing in the Last Year: Patient declined     Homeless in the Last Year: Patient declined     Past Surgical History:   Procedure Laterality Date    CAROTID STENT      COLONOSCOPY      EYE SURGERY      HEMORRHOID SURGERY      INSERTION OF  TUNNELED CENTRAL VENOUS CATHETER (CVC) WITH SUBCUTANEOUS PORT Right 10/21/2024    Procedure: INSERTION, PORT-A-CATH (Do 1st Pt. has Oncologist appt. after);  Surgeon: Moy Ruvalcaba MD;  Location: [facility] OR;  Service: General;  Laterality: Right;  Right internal jugular    JOINT REPLACEMENT  2020    KNEE SURGERY      right knee replaced     PROSTATE BIOPSY      PROSTATE SURGERY  2021    ROBOT-ASSISTED TRANSHIATAL ESOPHAGECTOMY N/A 03/31/2025    Procedure: ROBOTIC ESOPHAGECTOMY, TRANSHIATAL;  Surgeon: Gabe Talbot MD;  Location: [facility] OR;  Service: General;  Laterality: N/A;  lap robotic transhiatal esophagectomy with j tube placement // XX    TONSILLECTOMY           Review of systems:  Review of Systems   Constitutional:  Negative for activity change, appetite change, chills, diaphoresis, fatigue and unexpected weight change.   HENT:  Negative for congestion, facial swelling, hearing loss, mouth sores, trouble swallowing and voice change.    Eyes:  Negative for photophobia, pain, discharge and itching.   Respiratory:  Negative for apnea, cough, choking, chest tightness and shortness of breath.    Cardiovascular:  Negative for chest pain, palpitations and leg swelling.   Gastrointestinal:  Negative for abdominal distention, abdominal pain, anal bleeding and blood in stool.   Endocrine: Negative for cold intolerance, heat intolerance, polydipsia and polyphagia.   Genitourinary:  Negative for difficulty urinating, dysuria, flank pain and hematuria.   Musculoskeletal:  Negative for arthralgias, back pain, joint swelling, myalgias, neck pain and neck stiffness.   Skin:  Negative for color change, pallor and wound.   Allergic/Immunologic: Negative for environmental allergies, food allergies and immunocompromised state.   Neurological:  Negative for dizziness, seizures, facial asymmetry, speech difficulty, light-headedness, numbness and headaches.   Hematological:  Negative for adenopathy. Does not bruise/bleed  easily.   Psychiatric/Behavioral:  Negative for agitation, behavioral problems, confusion, decreased concentration and sleep disturbance.            Physical Exam  Vitals and nursing note reviewed.   Constitutional:       General: He is not in acute distress.     Appearance: Normal appearance. He is not ill-appearing.   HENT:      Head: Normocephalic and atraumatic.      Nose: No congestion or rhinorrhea.   Eyes:      General: No scleral icterus.     Extraocular Movements: Extraocular movements intact.      Pupils: Pupils are equal, round, and reactive to light.   Cardiovascular:      Rate and Rhythm: Normal rate and regular rhythm.      Pulses: Normal pulses.      Heart sounds: Normal heart sounds. No murmur heard.     No gallop.   Pulmonary:      Effort: Pulmonary effort is normal. No respiratory distress.      Breath sounds: Normal breath sounds. No stridor. No wheezing or rhonchi.   Abdominal:      General: Bowel sounds are normal. There is no distension.      Palpations: There is no mass.      Tenderness: There is no abdominal tenderness. There is no guarding.   Musculoskeletal:         General: No swelling, tenderness, deformity or signs of injury. Normal range of motion.      Cervical back: Normal range of motion and neck supple. No rigidity. No muscular tenderness.      Right lower leg: No edema.      Left lower leg: No edema.   Skin:     General: Skin is warm.      Coloration: Skin is not jaundiced or pale.      Findings: No bruising or lesion.   Neurological:      General: No focal deficit present.      Mental Status: He is alert and oriented to person, place, and time.      Cranial Nerves: No cranial nerve deficit.      Sensory: No sensory deficit.      Motor: No weakness.      Gait: Gait normal.   Psychiatric:         Mood and Affect: Mood normal.         Behavior: Behavior normal.         Thought Content: Thought content normal.     Vitals:    07/08/25 0923   BP: 105/60   Pulse: 82   Resp: 16   Temp:  97.9 °F (36.6 °C)         Body surface area is 1.97 meters squared.    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  I will obtain PET scan to complete staging and to rule out distant metastatic disease.  If no evidence of distant metastatic disease then plan would be for chemo XRT approach likely considering his age.  If localized disease then patient would need EUS staging for completion.  Will also make referral to Radiation Oncology.  == 10/22/24: EUS staging endoscopy scheduled this week by Dr Ruvalcaba. TMB discussion with plan for concurrent chemo-XRT. PET scan for restaging showed no evidence of distant metastatic disease. Referral to Rad-onc was already placed previously.  ==10/30/24:  Patient here today to discuss upcoming chemotherapy/immunotherapy. An extensive discussion was had which included a thorough discussion of potential side effect profile, risk versus benefits, and expected outcomes.  Risks, including but not limited to, possible hair loss, bone marrow damage, damage to body organs, allergic reactions, sterility, nausea/vomiting, constipation/diarrhea, sores in the mouth, secondary cancers, and rarely death were all discuss.  Specific side effects pertaining to their chemotherapy/immunotherapy medications were discussed as well.  The patient agrees with the plan and all questions and their support systems questions have been answered to their satisfaction.  Premedications were prescribed and patient was educated on appropriate usage.  Patient was educated on when to call, and given the number to call, or to notify the provider including but not limited to:  Persistent nausea and vomiting, dehydration, fever greater than 100.4 lasting over 1 hour induration or isolated feeders greater than 101, rash while on active chemotherapy or immunotherapy, severe pain or new onset pain not  controlled by current pain medication regimen. Patient is scheduled for initial visit with Dr. Bernal for XRT evaluation on 11/1/2024.  Will require evaluation, treatment planning/simulation so would not be able to start concurrent chemo/xrt next week.  Will likely be second week of November.  ==11/18/24: Discussed EUS with Dr. Bernal who discussed with Dr. Chavez, will not change treatment plan thus was deferred.  Pt started XRT this am, labs reviewed and he is cleared to start carboplatin/taxol weekly concurrent with xrt  == 11/25/24: tolerating chem/xrt well with no side effects at this time.   ==12/20/24: Pt has 15 more treatments of xrt, will finish on 12/23/24. Anc 1.7, tolerating chemotherapy and xrt well.  Having some mild constipation treating with prunes and mirilax, may also use senna prn. Labs reviewed, patient is cleared for chemotherapy. He reports very mild intermittent muscle cramps to bilateral legs at night will check magnesium with next labs due to carboplatin  ==12/9/24: Pt had nausea and vomiting after chemotherapy last week, will send out compazine to alternate with zofran. Instructed to notify clinic if persists. Labs reviewed, pt is cleared for chemotherapy  ==12/16/24: Pt to clinic will receive last cycle chemotherapy concurrent with xrt. He would like to see Dr. Hannah in [place], he has call in to [facility] for referral authorization.  Tolerating chemotherapy well, today will be final treatment. Will order post treatment scan for 4 weeks and have pt rtc with md  == 1/27/25: S/p concurrent chemo-XRT. Doing well. Recent PET scan even though direct comparison not available but no evidence of metastatic disease. Patient has yet to see surgery and reports awaiting [facility] authorization before he sees them.  == 4/28/25: S/p concurrent chemo XRT followed by transhiatal esophagectomy which showed a small volume of residual adenocarcinoma without distinct mass formation, grade 2-3, partial  response (score 2) with 1/14 lymph nodes positive for malignancy, ypT1a N1.  Will recommend postoperative systemic therapy with nivolumab which is category 1 per NCCN guidelines for residual disease with R0 resection. Will send PA request for Nivolumab 480 mg once every 4 weeks; continue until disease progression or unacceptable toxicity for up to 1 year.   ==05/05/2025:  Patient here today to discuss upcoming chemotherapy/immunotherapy. An extensive discussion was had which included a thorough discussion of potential side effect profile, risk versus benefits, and expected outcomes.  Risks, including but not limited to, possible hair loss, bone marrow damage, damage to body organs, allergic reactions, sterility, nausea/vomiting, constipation/diarrhea, sores in the mouth, secondary cancers, and rarely death were all discuss.  Specific side effects pertaining to their chemotherapy/immunotherapy medications were discussed as well.  The patient agrees with the plan and all questions and their support systems questions have been answered to their satisfaction.  Premedications were prescribed and patient was educated on appropriate usage.  Patient was educated on when to call, and given the number to call, or to notify the provider including but not limited to:  Persistent nausea and vomiting, dehydration, fever greater than 100.4 lasting over 1 hour induration or isolated feeders greater than 101, rash while on active chemotherapy or immunotherapy, severe pain or new onset pain not controlled by current pain medication regimen.  Will start nivolumab on 5/13/2025 pending insurance approval  ==05/13/2025: Nivolumab still pending insurance approval, labs reviewed, pt is cleared to start once approved by insurance, order signed  ==06/03/2025: Pt has not started nivolumab yet due to insurance, finally approved will start today.   ==07/08/2025: Tolerated first cycle of nivolumab well, denies complaints, hgb decreased slightly,  is normocytic, likely related to nivo, will check iron studies with next labs      Plan:  Start Nivolumab q 4 weeks  Rtc 4 weeks cbc cmp tsh, iron studies        Total time spent in counseling and discussion about further management options including relevant lab work, treatment,  prognosis, medications and intended side effects was more than 40 minutes. More than 50% of the time was spent on counseling and coordination of care.  I spent a total of 40 minutes on the day of the visit.This includes face to face time and non-face to face time preparing to see the patient (eg, review of tests), Obtaining and/or reviewing separately obtained history, Documenting clinical information in the electronic or other health record, Independently interpreting resultsand communicating results to the patient/family/caregiver, or Care coordination.

## 2025-07-14 ENCOUNTER — TELEPHONE (OUTPATIENT)
Dept: HEMATOLOGY/ONCOLOGY | Facility: CLINIC | Age: 80
End: 2025-07-14
Payer: OTHER GOVERNMENT

## 2025-07-14 NOTE — TELEPHONE ENCOUNTER
Sent Cely Dos Santos NP and in basket message from Dr. Canela wanting to get medical advice about this patient having a rash in genitals, weak, nausea after a week of infusion. He is was wanting to know if the pt will need antibiotics or is this normal after infusion. I also sent the message in teams to Cely and she said that she will look at it tomorrow.

## 2025-08-04 ENCOUNTER — TELEPHONE (OUTPATIENT)
Dept: HEMATOLOGY/ONCOLOGY | Facility: CLINIC | Age: 80
End: 2025-08-04
Payer: OTHER GOVERNMENT

## 2025-08-05 ENCOUNTER — OFFICE VISIT (OUTPATIENT)
Dept: HEMATOLOGY/ONCOLOGY | Facility: CLINIC | Age: 80
End: 2025-08-05
Payer: OTHER GOVERNMENT

## 2025-08-05 VITALS
OXYGEN SATURATION: 97 % | DIASTOLIC BLOOD PRESSURE: 75 MMHG | BODY MASS INDEX: 25.5 KG/M2 | RESPIRATION RATE: 16 BRPM | HEART RATE: 79 BPM | HEIGHT: 69 IN | SYSTOLIC BLOOD PRESSURE: 119 MMHG | WEIGHT: 172.13 LBS | TEMPERATURE: 98 F

## 2025-08-05 DIAGNOSIS — R53.83 FATIGUE, UNSPECIFIED TYPE: ICD-10-CM

## 2025-08-05 DIAGNOSIS — D63.0 ANEMIA IN NEOPLASTIC DISEASE: ICD-10-CM

## 2025-08-05 DIAGNOSIS — R21 RASH OF GROIN: ICD-10-CM

## 2025-08-05 DIAGNOSIS — C16.0 GE JUNCTION CARCINOMA: Primary | ICD-10-CM

## 2025-08-05 PROCEDURE — 99215 OFFICE O/P EST HI 40 MIN: CPT | Mod: S$PBB,,, | Performed by: NURSE PRACTITIONER

## 2025-08-05 PROCEDURE — G2211 COMPLEX E/M VISIT ADD ON: HCPCS | Mod: ,,, | Performed by: NURSE PRACTITIONER

## 2025-08-05 RX ORDER — SODIUM CHLORIDE 0.9 % (FLUSH) 0.9 %
10 SYRINGE (ML) INJECTION
Status: CANCELLED | OUTPATIENT
Start: 2025-08-05

## 2025-08-05 RX ORDER — HEPARIN 100 UNIT/ML
500 SYRINGE INTRAVENOUS
Status: CANCELLED | OUTPATIENT
Start: 2025-08-05

## 2025-08-05 RX ORDER — EPINEPHRINE 0.3 MG/.3ML
0.3 INJECTION SUBCUTANEOUS ONCE AS NEEDED
Status: CANCELLED | OUTPATIENT
Start: 2025-08-05

## 2025-08-05 RX ORDER — DIPHENHYDRAMINE HYDROCHLORIDE 50 MG/ML
50 INJECTION, SOLUTION INTRAMUSCULAR; INTRAVENOUS ONCE AS NEEDED
Status: CANCELLED | OUTPATIENT
Start: 2025-08-05

## 2025-08-05 NOTE — PROGRESS NOTES
MEDICAL ONCOLOGY FOLLOW UP CONSULTATION NOTE    Patient ID: Marcelino Al is a 79 y.o. male.    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  Patient presents to our clinic today for further evaluation      Pathology:    A.  Gastric antrum biopsy:  Chronic gastritis    B.  GE junction biopsy x3:  Moderately differentiated adenocarcinoma in a background of Addison's esophagus, see synoptic.      Synoptic report:  Procedure.  Biopsy  Tumor site: GE junction  Histologic type: Adenocarcinoma  Histologic grade: G2 of G3: Moderately differentiated  Tumor extent: Invades lamina propria: At least  Lymphatic and or vascular invasion: Not identified  Perineural invasion: Not identified  Additional findings: Addison's esophagus    C.  Esophageal mass at 30 cm:  Moderately differentiated adenocarcinoma in a background of Addison's esophagus, see synoptic.    Synoptic report:     Histologic type: Adenocarcinoma  Histologic grade: G2 of G3: Moderately differentiated  Tumor extent: Suspicious for invasion into submucosa  Lymphatic and vascular invasion: Not identified  Perineural invasion: Not identified        Imaging:   10/12/2024 CT PET  Impression:     1. Hypermetabolic mass of the distal esophagus consistent with patient's known malignancy with a metastatic paraesophageal superior mediastinal lymph node.  2. Radiotracer uptake in nondisplaced fractures of the right 8th, 9th, and 10th ribs.  There is no underlying osseous metastasis    Past Medical History:   Diagnosis Date    Arthritis     bilat hands    BPH with urinary obstruction     CAD (coronary artery disease)     Elevated cholesterol     Elevated PSA     GE junction carcinoma     Hypertension     Sleep apnea      CPAP     Family History   Problem Relation Name Age of Onset    Heart disease Father Ankush Al     Cancer Sister Aden     Heart disease Brother Yvonne Trmichelle     Heart disease Brother Yvonne     Stroke Brother Yvonne      Social History     Socioeconomic History    Marital status:    Tobacco Use    Smoking status: Former     Current packs/day: 0.00     Average packs/day: 1 pack/day for 30.0 years (30.0 ttl pk-yrs)     Types: Cigarettes     Quit date: 11/15/1995     Years since quittin.7     Passive exposure: Past    Smokeless tobacco: Never   Substance and Sexual Activity    Alcohol use: Yes     Alcohol/week: 8.0 standard drinks of alcohol     Types: 8 Glasses of wine per week     Comment: couple glasses of wine a week    Drug use: Never    Sexual activity: Not Currently     Partners: Female     Birth control/protection: None     Social Drivers of Health     Financial Resource Strain: Patient Declined (2025)    Overall Financial Resource Strain (CARDIA)     Difficulty of Paying Living Expenses: Patient declined   Food Insecurity: Patient Declined (2025)    Hunger Vital Sign     Worried About Running Out of Food in the Last Year: Patient declined     Ran Out of Food in the Last Year: Patient declined   Transportation Needs: Patient Declined (2025)    PRAPARE - Transportation     Lack of Transportation (Medical): Patient declined     Lack of Transportation (Non-Medical): Patient declined   Stress: Patient Declined (2025)    [employer] - Occupational Stress Questionnaire     Feeling of Stress : Patient declined   Housing Stability: Patient Declined (2025)    Housing Stability Vital Sign     Unable to Pay for Housing in the Last Year: Patient declined     Homeless in the Last Year: Patient declined     Past Surgical History:   Procedure Laterality Date    CAROTID STENT      COLONOSCOPY      EYE SURGERY      HEMORRHOID SURGERY      INSERTION OF  TUNNELED CENTRAL VENOUS CATHETER (CVC) WITH SUBCUTANEOUS PORT Right 10/21/2024    Procedure: INSERTION, PORT-A-CATH (Do 1st Pt. has Oncologist appt. after);  Surgeon: Moy Ruvalcaba MD;  Location: [facility] OR;  Service: General;  Laterality: Right;  Right internal jugular    JOINT REPLACEMENT  2020    KNEE SURGERY      right knee replaced     PROSTATE BIOPSY      PROSTATE SURGERY  2021    ROBOT-ASSISTED TRANSHIATAL ESOPHAGECTOMY N/A 03/31/2025    Procedure: ROBOTIC ESOPHAGECTOMY, TRANSHIATAL;  Surgeon: Gabe Talbot MD;  Location: [facility] OR;  Service: General;  Laterality: N/A;  lap robotic transhiatal esophagectomy with j tube placement // XX    TONSILLECTOMY           Review of systems:  Review of Systems   Constitutional:  Positive for fatigue. Negative for activity change, appetite change, chills, diaphoresis and unexpected weight change.   HENT:  Negative for congestion, facial swelling, hearing loss, mouth sores, trouble swallowing and voice change.    Eyes:  Negative for photophobia, pain, discharge and itching.   Respiratory:  Negative for apnea, cough, choking, chest tightness and shortness of breath.    Cardiovascular:  Negative for chest pain, palpitations and leg swelling.   Gastrointestinal:  Negative for abdominal distention, abdominal pain, anal bleeding and blood in stool.   Endocrine: Negative for cold intolerance, heat intolerance, polydipsia and polyphagia.   Genitourinary:  Negative for difficulty urinating, dysuria, flank pain and hematuria.   Musculoskeletal:  Negative for arthralgias, back pain, joint swelling, myalgias, neck pain and neck stiffness.   Skin:  Positive for rash. Negative for color change, pallor and wound.   Allergic/Immunologic: Negative for environmental allergies, food allergies and immunocompromised state.   Neurological:  Negative for dizziness, seizures, facial asymmetry, speech difficulty, light-headedness, numbness and headaches.   Hematological:  Negative for  adenopathy. Does not bruise/bleed easily.   Psychiatric/Behavioral:  Negative for agitation, behavioral problems, confusion, decreased concentration and sleep disturbance.            Physical Exam  Vitals and nursing note reviewed.   Constitutional:       General: He is not in acute distress.     Appearance: Normal appearance. He is not ill-appearing.   HENT:      Head: Normocephalic and atraumatic.      Nose: No congestion or rhinorrhea.   Eyes:      General: No scleral icterus.     Extraocular Movements: Extraocular movements intact.      Pupils: Pupils are equal, round, and reactive to light.   Cardiovascular:      Rate and Rhythm: Normal rate and regular rhythm.      Pulses: Normal pulses.      Heart sounds: Normal heart sounds. No murmur heard.     No gallop.   Pulmonary:      Effort: Pulmonary effort is normal. No respiratory distress.      Breath sounds: Normal breath sounds. No stridor. No wheezing or rhonchi.   Abdominal:      General: Bowel sounds are normal. There is no distension.      Palpations: There is no mass.      Tenderness: There is no abdominal tenderness. There is no guarding.   Musculoskeletal:         General: No swelling, tenderness, deformity or signs of injury. Normal range of motion.      Cervical back: Normal range of motion and neck supple. No rigidity. No muscular tenderness.      Right lower leg: No edema.      Left lower leg: No edema.   Skin:     General: Skin is warm.      Coloration: Skin is not jaundiced or pale.      Findings: No bruising or lesion.   Neurological:      General: No focal deficit present.      Mental Status: He is alert and oriented to person, place, and time.      Cranial Nerves: No cranial nerve deficit.      Sensory: No sensory deficit.      Motor: No weakness.      Gait: Gait normal.   Psychiatric:         Mood and Affect: Mood normal.         Behavior: Behavior normal.         Thought Content: Thought content normal.     There were no vitals filed for this  visit.        There is no height or weight on file to calculate BSA.    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  I will obtain PET scan to complete staging and to rule out distant metastatic disease.  If no evidence of distant metastatic disease then plan would be for chemo XRT approach likely considering his age.  If localized disease then patient would need EUS staging for completion.  Will also make referral to Radiation Oncology.  == 10/22/24: EUS staging endoscopy scheduled this week by Dr Ruvalcaba. TMB discussion with plan for concurrent chemo-XRT. PET scan for restaging showed no evidence of distant metastatic disease. Referral to Rad-onc was already placed previously.  ==10/30/24:  Patient here today to discuss upcoming chemotherapy/immunotherapy. An extensive discussion was had which included a thorough discussion of potential side effect profile, risk versus benefits, and expected outcomes.  Risks, including but not limited to, possible hair loss, bone marrow damage, damage to body organs, allergic reactions, sterility, nausea/vomiting, constipation/diarrhea, sores in the mouth, secondary cancers, and rarely death were all discuss.  Specific side effects pertaining to their chemotherapy/immunotherapy medications were discussed as well.  The patient agrees with the plan and all questions and their support systems questions have been answered to their satisfaction.  Premedications were prescribed and patient was educated on appropriate usage.  Patient was educated on when to call, and given the number to call, or to notify the provider including but not limited to:  Persistent nausea and vomiting, dehydration, fever greater than 100.4 lasting over 1 hour induration or isolated feeders greater than 101, rash while on active chemotherapy or immunotherapy, severe pain or new onset pain not  controlled by current pain medication regimen. Patient is scheduled for initial visit with Dr. Bernal for XRT evaluation on 11/1/2024.  Will require evaluation, treatment planning/simulation so would not be able to start concurrent chemo/xrt next week.  Will likely be second week of November.  ==11/18/24: Discussed EUS with Dr. Bernal who discussed with Dr. Chavez, will not change treatment plan thus was deferred.  Pt started XRT this am, labs reviewed and he is cleared to start carboplatin/taxol weekly concurrent with xrt  == 11/25/24: tolerating chem/xrt well with no side effects at this time.   ==12/20/24: Pt has 15 more treatments of xrt, will finish on 12/23/24. Anc 1.7, tolerating chemotherapy and xrt well.  Having some mild constipation treating with prunes and mirilax, may also use senna prn. Labs reviewed, patient is cleared for chemotherapy. He reports very mild intermittent muscle cramps to bilateral legs at night will check magnesium with next labs due to carboplatin  ==12/9/24: Pt had nausea and vomiting after chemotherapy last week, will send out compazine to alternate with zofran. Instructed to notify clinic if persists. Labs reviewed, pt is cleared for chemotherapy  ==12/16/24: Pt to clinic will receive last cycle chemotherapy concurrent with xrt. He would like to see Dr. Hannah in [place], he has call in to [facility] for referral authorization.  Tolerating chemotherapy well, today will be final treatment. Will order post treatment scan for 4 weeks and have pt rtc with md  == 1/27/25: S/p concurrent chemo-XRT. Doing well. Recent PET scan even though direct comparison not available but no evidence of metastatic disease. Patient has yet to see surgery and reports awaiting [facility] authorization before he sees them.  == 4/28/25: S/p concurrent chemo XRT followed by transhiatal esophagectomy which showed a small volume of residual adenocarcinoma without distinct mass formation, grade 2-3, partial  response (score 2) with 1/14 lymph nodes positive for malignancy, ypT1a N1.  Will recommend postoperative systemic therapy with nivolumab which is category 1 per NCCN guidelines for residual disease with R0 resection. Will send PA request for Nivolumab 480 mg once every 4 weeks; continue until disease progression or unacceptable toxicity for up to 1 year.   ==05/05/2025:  Patient here today to discuss upcoming chemotherapy/immunotherapy. An extensive discussion was had which included a thorough discussion of potential side effect profile, risk versus benefits, and expected outcomes.  Risks, including but not limited to, possible hair loss, bone marrow damage, damage to body organs, allergic reactions, sterility, nausea/vomiting, constipation/diarrhea, sores in the mouth, secondary cancers, and rarely death were all discuss.  Specific side effects pertaining to their chemotherapy/immunotherapy medications were discussed as well.  The patient agrees with the plan and all questions and their support systems questions have been answered to their satisfaction.  Premedications were prescribed and patient was educated on appropriate usage.  Patient was educated on when to call, and given the number to call, or to notify the provider including but not limited to:  Persistent nausea and vomiting, dehydration, fever greater than 100.4 lasting over 1 hour induration or isolated feeders greater than 101, rash while on active chemotherapy or immunotherapy, severe pain or new onset pain not controlled by current pain medication regimen.  Will start nivolumab on 5/13/2025 pending insurance approval  ==05/13/2025: Nivolumab still pending insurance approval, labs reviewed, pt is cleared to start once approved by insurance, order signed  ==06/03/2025: Pt has not started nivolumab yet due to insurance, finally approved will start today.   ==07/08/2025: Tolerated first cycle of nivolumab well, denies complaints, hgb decreased slightly,  is normocytic, likely related to nivo, will check iron studies with next labs  ==08/05/2025: Ongoing care- Patient reports some fatigue, has anemia, iron studies were ordered to be drawn with labs prior to today's visit, no results available, will request results and if low start on po iron, discussed risk for constipation.  Labs reviewed, will continue opdivo    2. Fatigue  ==Iron studies ordered, will request results if not completed will redraw and start on iron if low    3. Anemia  ==stable, hgb 12.2, iron studies ordered     3. Rash to groin area  ==Patient reports improved, undergoing treatment per pcp for skin tags, recommend referral to derm if continues or worsens    Plan:  Continue immunotherapy - Nivolumab  Rtc 4 weeks cbc cmp tsh  Request iron studies - if not drawn then may redraw or draw with next labs        Total time spent in counseling and discussion about further management options including relevant lab work, treatment,  prognosis, medications and intended side effects was more than 40 minutes. More than 50% of the time was spent on counseling and coordination of care.  I spent a total of 40 minutes on the day of the visit.This includes face to face time and non-face to face time preparing to see the patient (eg, review of tests), Obtaining and/or reviewing separately obtained history, Documenting clinical information in the electronic or other health record, Independently interpreting resultsand communicating results to the patient/family/caregiver, or Care coordination.

## 2025-08-28 ENCOUNTER — TELEPHONE (OUTPATIENT)
Dept: HEMATOLOGY/ONCOLOGY | Facility: CLINIC | Age: 80
End: 2025-08-28
Payer: OTHER GOVERNMENT

## 2025-08-29 ENCOUNTER — TELEPHONE (OUTPATIENT)
Dept: HEMATOLOGY/ONCOLOGY | Facility: CLINIC | Age: 80
End: 2025-08-29
Payer: OTHER GOVERNMENT

## 2025-09-02 ENCOUNTER — DOCUMENTATION ONLY (OUTPATIENT)
Dept: HEMATOLOGY/ONCOLOGY | Facility: CLINIC | Age: 80
End: 2025-09-02

## 2025-09-02 ENCOUNTER — OFFICE VISIT (OUTPATIENT)
Dept: HEMATOLOGY/ONCOLOGY | Facility: CLINIC | Age: 80
End: 2025-09-02
Payer: OTHER GOVERNMENT

## 2025-09-02 VITALS
TEMPERATURE: 98 F | BODY MASS INDEX: 25.8 KG/M2 | RESPIRATION RATE: 16 BRPM | DIASTOLIC BLOOD PRESSURE: 71 MMHG | HEIGHT: 69 IN | HEART RATE: 75 BPM | SYSTOLIC BLOOD PRESSURE: 130 MMHG | WEIGHT: 174.19 LBS | OXYGEN SATURATION: 97 %

## 2025-09-02 DIAGNOSIS — C16.0 GE JUNCTION CARCINOMA: ICD-10-CM

## 2025-09-02 DIAGNOSIS — C15.8: Primary | ICD-10-CM

## 2025-09-02 PROCEDURE — 99215 OFFICE O/P EST HI 40 MIN: CPT | Mod: S$PBB,,, | Performed by: NURSE PRACTITIONER

## 2025-09-02 RX ORDER — EPINEPHRINE 0.3 MG/.3ML
0.3 INJECTION SUBCUTANEOUS ONCE AS NEEDED
Status: CANCELLED | OUTPATIENT
Start: 2025-09-02 | End: 2037-01-29

## 2025-09-02 RX ORDER — HEPARIN 100 UNIT/ML
500 SYRINGE INTRAVENOUS
Status: CANCELLED | OUTPATIENT
Start: 2025-09-02

## 2025-09-02 RX ORDER — DIPHENHYDRAMINE HYDROCHLORIDE 50 MG/ML
50 INJECTION, SOLUTION INTRAMUSCULAR; INTRAVENOUS ONCE AS NEEDED
Status: CANCELLED | OUTPATIENT
Start: 2025-09-02 | End: 2037-01-29

## 2025-09-02 RX ORDER — SODIUM CHLORIDE 0.9 % (FLUSH) 0.9 %
10 SYRINGE (ML) INJECTION
Status: CANCELLED | OUTPATIENT
Start: 2025-09-02

## (undated) DEVICE — SUT ETHIBOND 0 CR/MO-7 8-18

## (undated) DEVICE — ELECTRODE PATIENT RETURN DISP

## (undated) DEVICE — STAPLER SKIN PROXIMATE WIDE

## (undated) DEVICE — TROCAR KII FIOS ZTHREAD 11X100

## (undated) DEVICE — CUTTER ECHELON LINEAR 80MM

## (undated) DEVICE — DECANTER FLUID TRNSF WHITE 9IN

## (undated) DEVICE — DRAPE LEGGINGS CUFF 31X48IN

## (undated) DEVICE — DRAIN PENROSE SIL .25X12IN

## (undated) DEVICE — KIT SURGICAL TURNOVER

## (undated) DEVICE — SUT MCRYL PLUS 4-0 PS2 27IN

## (undated) DEVICE — SEALER VESSEL EXTEND

## (undated) DEVICE — SOL .9NACL PF 100 ML

## (undated) DEVICE — SUT VICRYL+ 27 UR-6 VIOL

## (undated) DEVICE — SUT VICRYL PLUS 3-0 SH 18IN

## (undated) DEVICE — IRRIGATOR SUCTION W/TIP

## (undated) DEVICE — TRAY CATH 1-LYR URIMTR 16FR

## (undated) DEVICE — SOL CLEARIFY VISUALIZATION LAP

## (undated) DEVICE — DECANTER BAG FLUID TRANSFER

## (undated) DEVICE — GLOVE SENSICARE PI SURG 6.5

## (undated) DEVICE — SUPPORT ULNA NERVE PROTECTOR

## (undated) DEVICE — LIGATING CLIP LARGE

## (undated) DEVICE — SOL ELECTROLUBE ANTI-STIC

## (undated) DEVICE — SYR ONLY LUER LOCK 20CC

## (undated) DEVICE — RESERVOIR JACKSON-PRATT 100CC

## (undated) DEVICE — DRAPE ARM DAVINCI XI

## (undated) DEVICE — GLOVE SIGNATURE ESSNTL LTX 7.5

## (undated) DEVICE — SOL IRRI STRL WATER 1000ML

## (undated) DEVICE — PAD ELECTROSURGICAL SPL W/CORD

## (undated) DEVICE — SET TRI-LUMEN FILTERED TUBE

## (undated) DEVICE — HOLDER STRIP-T SELF ADH 2X10IN

## (undated) DEVICE — SEAL CANN UNIVERSAL 5-12MM

## (undated) DEVICE — ADHESIVE DERMABOND ADVANCED

## (undated) DEVICE — CLIP LIGATING MEDIUM

## (undated) DEVICE — CLIP HEMO-LOK ML

## (undated) DEVICE — SPONGE LAP 18X18 PREWASHED

## (undated) DEVICE — SYR DISP LL 5CC

## (undated) DEVICE — DRAPE INCISE IOBAN 2 23X23IN

## (undated) DEVICE — DRAPE COLUMN DAVINCI XI

## (undated) DEVICE — CONTAINER SPECIMEN SCREW 4OZ

## (undated) DEVICE — Device

## (undated) DEVICE — SYR SLIP TIP 10ML SHIELD

## (undated) DEVICE — GLOVE PROTEXIS LTX MICRO  7

## (undated) DEVICE — GLOVE SENSICARE NEOPRENE 7.5

## (undated) DEVICE — DRAIN PENROSE XRAY 18 X 1 ST

## (undated) DEVICE — DRAPE STERI LONG

## (undated) DEVICE — PORT ACCESS 8MM W/120MM LOW

## (undated) DEVICE — DRAIN ROUND SUCTION 10FR

## (undated) DEVICE — SUT 3-0 VICRYL SH CR/8 18

## (undated) DEVICE — COVER PROBE US 5.5X58L NON LTX

## (undated) DEVICE — CLIP HORIZON LIG TI MED-LG

## (undated) DEVICE — STAPLER INT PROX TX 30X3.5MM

## (undated) DEVICE — COVER MAYO STND XL 30X57IN

## (undated) DEVICE — SUT 2-0 12-18IN SILK

## (undated) DEVICE — SUT 4/0 27IN PDS II VIO MO

## (undated) DEVICE — DRESSING TELFA N ADH 3X8

## (undated) DEVICE — SYR 10CC LUER LOCK

## (undated) DEVICE — OBTURATOR BLADELESS 8MM XI CLR

## (undated) DEVICE — DRAPE FLUID WARMER ORS 44X44IN

## (undated) DEVICE — TROCAR ENDO Z THREAD KII 5X100

## (undated) DEVICE — NDL 22GA X1 1/2 REG BEVEL

## (undated) DEVICE — SPONGE GAUZE 16PLY 4X4

## (undated) DEVICE — GLOVE SENSICARE PI GRN 8

## (undated) DEVICE — CLIP LIGATING HEMOCLP SMALL

## (undated) DEVICE — R SEALER LIGASURE IMPACT 18CM

## (undated) DEVICE — DEVICE TRIVERSE HOLSTER 4.5MCO

## (undated) DEVICE — SUT 3-0 12-18IN SILK

## (undated) DEVICE — SUT SILK 3-0 SH 18IN BLACK

## (undated) DEVICE — DRAPE INCISE IOBAN 2 23X17IN

## (undated) DEVICE — COVER TIP CURVED SCISSORS XI

## (undated) DEVICE — PACK ECLIPSE UNIVERSAL STERILE

## (undated) DEVICE — DRAPE MOBILE C-ARM

## (undated) DEVICE — SYR IRRIGATION BULB STER 60ML

## (undated) DEVICE — RELOAD ECHELON LIN BLU 80MM

## (undated) DEVICE — SUT GUT PL. 4-0 27 FS-2

## (undated) DEVICE — NDL 20GX1-1/2IN IB

## (undated) DEVICE — SOL NACL .9P 500ML

## (undated) DEVICE — GLOVE SENSICARE PI GRN 7

## (undated) DEVICE — GLOVE PROTEXIS HYDROGEL SZ7

## (undated) DEVICE — GLOVE SIGNATURE ESSNTL LTX 6.5